# Patient Record
Sex: MALE | Race: WHITE | NOT HISPANIC OR LATINO | Employment: OTHER | URBAN - METROPOLITAN AREA
[De-identification: names, ages, dates, MRNs, and addresses within clinical notes are randomized per-mention and may not be internally consistent; named-entity substitution may affect disease eponyms.]

---

## 2017-03-30 ENCOUNTER — HOSPITAL ENCOUNTER (OUTPATIENT)
Dept: LAB | Facility: MEDICAL CENTER | Age: 62
End: 2017-03-30
Attending: INTERNAL MEDICINE
Payer: COMMERCIAL

## 2017-03-30 LAB
ALBUMIN SERPL BCP-MCNC: 4.5 G/DL (ref 3.2–4.9)
ALBUMIN/GLOB SERPL: 2 G/DL
ALP SERPL-CCNC: 52 U/L (ref 30–99)
ALT SERPL-CCNC: 26 U/L (ref 2–50)
ANION GAP SERPL CALC-SCNC: 7 MMOL/L (ref 0–11.9)
AST SERPL-CCNC: 21 U/L (ref 12–45)
BASOPHILS # BLD AUTO: 0.06 K/UL (ref 0–0.12)
BASOPHILS NFR BLD AUTO: 1 % (ref 0–1.8)
BILIRUB SERPL-MCNC: 0.6 MG/DL (ref 0.1–1.5)
BUN SERPL-MCNC: 24 MG/DL (ref 8–22)
CALCIUM SERPL-MCNC: 9.4 MG/DL (ref 8.5–10.5)
CHLORIDE SERPL-SCNC: 103 MMOL/L (ref 96–112)
CO2 SERPL-SCNC: 27 MMOL/L (ref 20–33)
CREAT SERPL-MCNC: 1.02 MG/DL (ref 0.5–1.4)
EOSINOPHIL # BLD: 0.37 K/UL (ref 0–0.51)
EOSINOPHIL NFR BLD AUTO: 6 % (ref 0–6.9)
ERYTHROCYTE [DISTWIDTH] IN BLOOD BY AUTOMATED COUNT: 41 FL (ref 35.9–50)
GLOBULIN SER CALC-MCNC: 2.2 G/DL (ref 1.9–3.5)
GLUCOSE SERPL-MCNC: 92 MG/DL (ref 65–99)
HCT VFR BLD AUTO: 48.3 % (ref 42–52)
HGB BLD-MCNC: 16.8 G/DL (ref 14–18)
IMM GRANULOCYTES # BLD AUTO: 0.01 K/UL (ref 0–0.11)
IMM GRANULOCYTES NFR BLD AUTO: 0.2 % (ref 0–0.9)
LYMPHOCYTES # BLD: 1.78 K/UL (ref 1–4.8)
LYMPHOCYTES NFR BLD AUTO: 29 % (ref 22–41)
MCH RBC QN AUTO: 31.8 PG (ref 27–33)
MCHC RBC AUTO-ENTMCNC: 34.8 G/DL (ref 33.7–35.3)
MCV RBC AUTO: 91.5 FL (ref 81.4–97.8)
MONOCYTES # BLD: 0.41 K/UL (ref 0–0.85)
MONOCYTES NFR BLD AUTO: 6.7 % (ref 0–13.4)
NEUTROPHILS # BLD: 3.51 K/UL (ref 1.82–7.42)
NEUTROPHILS NFR BLD AUTO: 57.1 % (ref 44–72)
NRBC # BLD AUTO: 0 K/UL
NRBC BLD-RTO: 0 /100 WBC
PLATELET # BLD AUTO: 235 K/UL (ref 164–446)
PMV BLD AUTO: 9.6 FL (ref 9–12.9)
POTASSIUM SERPL-SCNC: 4.2 MMOL/L (ref 3.6–5.5)
PROT SERPL-MCNC: 6.7 G/DL (ref 6–8.2)
PSA SERPL DL<=0.01 NG/ML-MCNC: 1.48 NG/ML (ref 0–4)
RBC # BLD AUTO: 5.28 M/UL (ref 4.7–6.1)
SODIUM SERPL-SCNC: 137 MMOL/L (ref 135–145)
WBC # BLD AUTO: 6.1 K/UL (ref 4.8–10.8)

## 2017-03-30 PROCEDURE — 84403 ASSAY OF TOTAL TESTOSTERONE: CPT

## 2017-03-30 PROCEDURE — 36415 COLL VENOUS BLD VENIPUNCTURE: CPT

## 2017-03-30 PROCEDURE — 85025 COMPLETE CBC W/AUTO DIFF WBC: CPT

## 2017-03-30 PROCEDURE — 80053 COMPREHEN METABOLIC PANEL: CPT

## 2017-03-30 PROCEDURE — 83704 LIPOPROTEIN BLD QUAN PART: CPT

## 2017-03-30 PROCEDURE — 84153 ASSAY OF PSA TOTAL: CPT

## 2017-03-30 PROCEDURE — 84402 ASSAY OF FREE TESTOSTERONE: CPT

## 2017-03-30 PROCEDURE — 80061 LIPID PANEL: CPT

## 2017-03-30 PROCEDURE — 84270 ASSAY OF SEX HORMONE GLOBUL: CPT

## 2017-03-31 LAB
SHBG SERPL-SCNC: 37 NMOL/L (ref 11–80)
TESTOST FREE MFR SERPL: 1.8 % (ref 1.6–2.9)
TESTOST FREE SERPL-MCNC: 99 PG/ML (ref 47–244)
TESTOST SERPL-MCNC: 551 NG/DL (ref 300–720)

## 2017-04-02 LAB
CHOLEST SERPL-MCNC: 178 MG/DL (ref 100–199)
HDL PARTICAL NO Q4363: 37.3 UMOL/L
HDL SERPL QN: 10.1 NM
HDLC SERPL-MCNC: 74 MG/DL
HLD.LARGE SERPL-SCNC: 10.6 UMOL/L
LDL MED SERPL QN: 21.2 NM
LDL SERPL QN: 21.2 NM
LDL SERPL-SCNC: 1035 NMOL/L
LDL SMALL SERPL-SCNC: <90 NMOL/L
LDL SMALL SERPL-SCNC: <90 NMOL/L
LDLC SERPL CALC-MCNC: 95 MG/DL (ref 0–99)
LP IR SCORE Q4364: <25
TRIGL SERPL-MCNC: 45 MG/DL (ref 0–149)
VLDL LARGE SERPL-SCNC: 1.3 NMOL/L
VLDL SERPL QN: 56.7 NM

## 2017-06-13 ENCOUNTER — OFFICE VISIT (OUTPATIENT)
Dept: INTERNAL MEDICINE | Facility: IMAGING CENTER | Age: 62
End: 2017-06-13
Payer: COMMERCIAL

## 2017-06-13 VITALS
DIASTOLIC BLOOD PRESSURE: 80 MMHG | TEMPERATURE: 97.9 F | WEIGHT: 192 LBS | HEART RATE: 65 BPM | RESPIRATION RATE: 14 BRPM | HEIGHT: 73 IN | OXYGEN SATURATION: 95 % | BODY MASS INDEX: 25.45 KG/M2 | SYSTOLIC BLOOD PRESSURE: 140 MMHG

## 2017-06-13 DIAGNOSIS — I10 ESSENTIAL HYPERTENSION: Chronic | ICD-10-CM

## 2017-06-13 DIAGNOSIS — R79.89 LOW TESTOSTERONE LEVEL IN MALE: Chronic | ICD-10-CM

## 2017-06-13 PROCEDURE — 99204 OFFICE O/P NEW MOD 45 MIN: CPT | Performed by: FAMILY MEDICINE

## 2017-06-13 RX ORDER — LOSARTAN POTASSIUM 50 MG/1
50 TABLET ORAL DAILY
COMMUNITY
End: 2017-08-01 | Stop reason: SDUPTHER

## 2017-06-13 RX ORDER — TESTOSTERONE 20.25 MG/1.25G
1 GEL TOPICAL DAILY
COMMUNITY
End: 2017-08-01 | Stop reason: SDUPTHER

## 2017-06-13 RX ORDER — AMLODIPINE BESYLATE 10 MG/1
10 TABLET ORAL DAILY
COMMUNITY
End: 2017-08-01 | Stop reason: SDUPTHER

## 2017-06-13 ASSESSMENT — PATIENT HEALTH QUESTIONNAIRE - PHQ9: CLINICAL INTERPRETATION OF PHQ2 SCORE: 0

## 2017-06-13 NOTE — PROGRESS NOTES
"CC: establish care    HPI:  Patient is a 61 y.o. male here to establish care at my office. He has previously seen Dr. Hayes for his primary care needs and had recent labs. He has chronic essential hypertension well controlled on daily norvasc and losartan - he reports that his home BP log runs 120/70s average. He also has chronic low testosterone on Androgel replacement for the past five years with frequent labs done by Dr. Hayes. Pt does endorse that he has not had a prostate exam in approximately 10+ years despite being on Androgel. Pt remains very active and tries to maintain a healthy diet. He is in good spirits today and has no complaints.     Patient Active Problem List    Diagnosis Date Noted   • Low testosterone level in male 06/13/2017   • Essential hypertension 06/13/2017     Past medical, surgical, family, and social history was reviewed and updated in Epic chart by me today.     Medications and allergies reviewed and updated in Epic chart by me today.     ROS:  Pertinent positives listed above in HPI. All other systems have been reviewed and are negative.    PE:   /80 mmHg  Pulse 65  Temp(Src) 36.6 °C (97.9 °F)  Resp 14  Ht 1.854 m (6' 1\")  Wt 87.091 kg (192 lb)  BMI 25.34 kg/m2  SpO2 95%  Vital signs reviewed with patient.     Gen: Well developed; well nourished; no acute distress; age appropriate appearance   HEENT: Normocephalic; atraumatic; PEERLA b/l; sclera clear b/l; b/l external auditory canals WNL; b/l TM WNL; oropharynx clear; oral mucosa moist; tongue midline; dentition adequate   Neck: No adenopathy; no thyromegaly  CV: Regular rate and rhythm; S1/ S2 present; no murmur, gallop or rub noted  Pulm: No respiratory distress; clear to ascultation b/l; no wheezing or stridor noted b/l  Abd: Adequate bowel sounds noted; soft and nontender; no rebound, rigidity, nor distention  Extremities: No peripheral edema b/l LE extremities/ no clubbing nor cyanosis noted  Skin: Warm and dry; no rashes " noted   Neuro: No focal deficits noted   Psych: AAOx4; mood and affect are appropriate    A/P:  1. Chronic low testosterone level in male  Stable/ pt continues daily Androgel/ will obtain recent labs and records from Dr. Hayes's office. He will need prostate exam before the end of the year.     2. Chronic essential hypertension  Stable/ home BP readings avg 120/70s/ pt to continue daily norvasc and losartan    I will be in touch with patient when I receive records from Dr. Hayes's office. Pt will need exam with prostate evaluation before the end of the year.

## 2017-06-13 NOTE — MR AVS SNAPSHOT
"        Evan Wright   2017 1:00 PM   Office Visit   MRN: [id number]    Department:  [facility]   Dept Phone:  [phone]    Description:  Male : 1955   Provider:  Adriana Hinson M.D.           Allergies as of 2017     No Known Allergies      You were diagnosed with     Low testosterone level in male   [0314800]       Essential hypertension   [[id number]]         Vital Signs     Blood Pressure Pulse Temperature Respirations Height Weight    140/80 mmHg 65 36.6 °C (97.9 °F) 14 1.854 m (6' 1\") 87.091 kg (192 lb)    Body Mass Index Oxygen Saturation Smoking Status             25.34 kg/m2 95% Never Smoker          Basic Information     Date Of Birth Sex Race Ethnicity Preferred Language    1955 Male White Non- English      Problem List              ICD-10-CM Priority Class Noted - Resolved    Low testosterone level in male (Chronic) E29.1   2017 - Present    Essential hypertension (Chronic) I10   2017 - Present      Health Maintenance     Patient has no pending health maintenance at this time      Current Immunizations     Tdap Vaccine 2014      Below and/or attached are the medications your provider expects you to take. Review all of your home medications and newly ordered medications with your provider and/or pharmacist. Follow medication instructions as directed by your provider and/or pharmacist. Please keep your medication list with you and share with your provider. Update the information when medications are discontinued, doses are changed, or new medications (including over-the-counter products) are added; and carry medication information at all times in the event of emergency situations     Allergies:  No Known Allergies          Medications  Valid as of: 2017 -  3:28 PM    Generic Name Brand Name Tablet Size Instructions for use    AmLODIPine Besylate (Tab) NORVASC 10 MG Take 10 mg by mouth every day.        B Complex Vitamins   Take 1 Tab by " mouth every day.        Bioflavonoid Products   Take 1 Tab by mouth every day.        Losartan Potassium (Tab) COZAAR 50 MG Take 50 mg by mouth every day.        Testosterone (Gel) Testosterone 20.25 MG/1.25GM (1.62%) Apply 1 Act to skin as directed every day.        .                 Medicines prescribed today were sent to:     [facility] [address] [place] - [address] [facility] [place]    [address] [facility] [place] [address]    Phone: [phone] Fax: [phone]    Open 24 Hours?: No      Medication refill instructions:       If your prescription bottle indicates you have medication refills left, it is not necessary to call your provider’s office. Please contact your pharmacy and they will refill your medication.    If your prescription bottle indicates you do not have any refills left, you may request refills at any time through one of the following ways: The online PatientKeeper system (except Urgent Care), by calling your provider’s office, or by asking your pharmacy to contact your provider’s office with a refill request. Medication refills are processed only during regular business hours and may not be available until the next business day. Your provider may request additional information or to have a follow-up visit with you prior to refilling your medication.   *Please Note: Medication refills are assigned a new Rx number when refilled electronically. Your pharmacy may indicate that no refills were authorized even though a new prescription for the same medication is available at the pharmacy. Please request the medicine by name with the pharmacy before contacting your provider for a refill.           PatientKeeper Access Code: Activation code not generated  Current PatientKeeper Status: Active

## 2017-07-03 DIAGNOSIS — Z00.00 HEALTH CARE MAINTENANCE: ICD-10-CM

## 2017-07-03 DIAGNOSIS — R79.89 LOW TESTOSTERONE LEVEL IN MALE: Chronic | ICD-10-CM

## 2017-07-03 DIAGNOSIS — I10 ESSENTIAL HYPERTENSION: Chronic | ICD-10-CM

## 2017-07-03 DIAGNOSIS — Z13.21 ENCOUNTER FOR VITAMIN DEFICIENCY SCREENING: ICD-10-CM

## 2017-08-01 DIAGNOSIS — I10 ESSENTIAL HYPERTENSION: Chronic | ICD-10-CM

## 2017-08-01 DIAGNOSIS — R79.89 LOW TESTOSTERONE LEVEL IN MALE: Chronic | ICD-10-CM

## 2017-08-01 RX ORDER — LOSARTAN POTASSIUM 50 MG/1
50 TABLET ORAL DAILY
Qty: 90 TAB | Refills: 4 | Status: SHIPPED | OUTPATIENT
Start: 2017-08-01 | End: 2018-08-21 | Stop reason: SDUPTHER

## 2017-08-01 RX ORDER — TESTOSTERONE 20.25 MG/1.25G
1 GEL TOPICAL DAILY
Qty: 88 G | Refills: 5 | Status: SHIPPED
Start: 2017-08-01 | End: 2018-03-12 | Stop reason: SDUPTHER

## 2017-08-01 RX ORDER — AMLODIPINE BESYLATE 10 MG/1
10 TABLET ORAL DAILY
Qty: 90 TAB | Refills: 4 | Status: SHIPPED | OUTPATIENT
Start: 2017-08-01 | End: 2018-08-21 | Stop reason: SDUPTHER

## 2017-09-01 ENCOUNTER — HOSPITAL ENCOUNTER (OUTPATIENT)
Facility: MEDICAL CENTER | Age: 62
End: 2017-09-01
Attending: FAMILY MEDICINE
Payer: COMMERCIAL

## 2017-09-01 ENCOUNTER — NON-PROVIDER VISIT (OUTPATIENT)
Dept: INTERNAL MEDICINE | Facility: IMAGING CENTER | Age: 62
End: 2017-09-01
Payer: COMMERCIAL

## 2017-09-01 DIAGNOSIS — Z00.00 HEALTH CARE MAINTENANCE: ICD-10-CM

## 2017-09-01 DIAGNOSIS — Z01.89 ENCOUNTER FOR ROUTINE LABORATORY TESTING: ICD-10-CM

## 2017-09-01 DIAGNOSIS — Z23 NEED FOR ZOSTER VACCINATION: ICD-10-CM

## 2017-09-01 DIAGNOSIS — I10 ESSENTIAL HYPERTENSION: Chronic | ICD-10-CM

## 2017-09-01 DIAGNOSIS — Z13.21 ENCOUNTER FOR VITAMIN DEFICIENCY SCREENING: ICD-10-CM

## 2017-09-01 DIAGNOSIS — R79.89 LOW TESTOSTERONE LEVEL IN MALE: Chronic | ICD-10-CM

## 2017-09-01 LAB
25(OH)D3 SERPL-MCNC: 40 NG/ML (ref 30–100)
ALBUMIN SERPL BCP-MCNC: 4.7 G/DL (ref 3.2–4.9)
ALBUMIN/GLOB SERPL: 2.4 G/DL
ALP SERPL-CCNC: 46 U/L (ref 30–99)
ALT SERPL-CCNC: 30 U/L (ref 2–50)
ANION GAP SERPL CALC-SCNC: 11 MMOL/L (ref 0–11.9)
AST SERPL-CCNC: 29 U/L (ref 12–45)
BASOPHILS # BLD AUTO: 0.5 % (ref 0–1.8)
BASOPHILS # BLD: 0.03 K/UL (ref 0–0.12)
BILIRUB SERPL-MCNC: 1.1 MG/DL (ref 0.1–1.5)
BUN SERPL-MCNC: 24 MG/DL (ref 8–22)
CALCIUM SERPL-MCNC: 10 MG/DL (ref 8.5–10.5)
CHLORIDE SERPL-SCNC: 104 MMOL/L (ref 96–112)
CHOLEST SERPL-MCNC: 177 MG/DL (ref 100–199)
CO2 SERPL-SCNC: 23 MMOL/L (ref 20–33)
CREAT SERPL-MCNC: 0.93 MG/DL (ref 0.5–1.4)
EOSINOPHIL # BLD AUTO: 0.24 K/UL (ref 0–0.51)
EOSINOPHIL NFR BLD: 4.3 % (ref 0–6.9)
ERYTHROCYTE [DISTWIDTH] IN BLOOD BY AUTOMATED COUNT: 44.7 FL (ref 35.9–50)
GFR SERPL CREATININE-BSD FRML MDRD: >60 ML/MIN/1.73 M 2
GLOBULIN SER CALC-MCNC: 2 G/DL (ref 1.9–3.5)
GLUCOSE SERPL-MCNC: 86 MG/DL (ref 65–99)
HCT VFR BLD AUTO: 49.2 % (ref 42–52)
HDLC SERPL-MCNC: 75 MG/DL
HGB BLD-MCNC: 16.8 G/DL (ref 14–18)
IMM GRANULOCYTES # BLD AUTO: 0.02 K/UL (ref 0–0.11)
IMM GRANULOCYTES NFR BLD AUTO: 0.4 % (ref 0–0.9)
LDLC SERPL CALC-MCNC: 88 MG/DL
LYMPHOCYTES # BLD AUTO: 1.44 K/UL (ref 1–4.8)
LYMPHOCYTES NFR BLD: 25.9 % (ref 22–41)
MCH RBC QN AUTO: 32 PG (ref 27–33)
MCHC RBC AUTO-ENTMCNC: 34.1 G/DL (ref 33.7–35.3)
MCV RBC AUTO: 93.7 FL (ref 81.4–97.8)
MONOCYTES # BLD AUTO: 0.45 K/UL (ref 0–0.85)
MONOCYTES NFR BLD AUTO: 8.1 % (ref 0–13.4)
NEUTROPHILS # BLD AUTO: 3.38 K/UL (ref 1.82–7.42)
NEUTROPHILS NFR BLD: 60.8 % (ref 44–72)
NRBC # BLD AUTO: 0 K/UL
NRBC BLD AUTO-RTO: 0 /100 WBC
PLATELET # BLD AUTO: 211 K/UL (ref 164–446)
PMV BLD AUTO: 10.5 FL (ref 9–12.9)
POTASSIUM SERPL-SCNC: 4.2 MMOL/L (ref 3.6–5.5)
PROT SERPL-MCNC: 6.7 G/DL (ref 6–8.2)
PSA SERPL-MCNC: 1.09 NG/ML (ref 0–4)
RBC # BLD AUTO: 5.25 M/UL (ref 4.7–6.1)
SODIUM SERPL-SCNC: 138 MMOL/L (ref 135–145)
TRIGL SERPL-MCNC: 69 MG/DL (ref 0–149)
WBC # BLD AUTO: 5.6 K/UL (ref 4.8–10.8)

## 2017-09-01 PROCEDURE — 84403 ASSAY OF TOTAL TESTOSTERONE: CPT

## 2017-09-01 PROCEDURE — 90736 HZV VACCINE LIVE SUBQ: CPT | Performed by: FAMILY MEDICINE

## 2017-09-01 PROCEDURE — 84402 ASSAY OF FREE TESTOSTERONE: CPT

## 2017-09-01 PROCEDURE — 84270 ASSAY OF SEX HORMONE GLOBUL: CPT

## 2017-09-01 PROCEDURE — 80061 LIPID PANEL: CPT

## 2017-09-01 PROCEDURE — 80053 COMPREHEN METABOLIC PANEL: CPT

## 2017-09-01 PROCEDURE — 84153 ASSAY OF PSA TOTAL: CPT

## 2017-09-01 PROCEDURE — 90471 IMMUNIZATION ADMIN: CPT | Performed by: FAMILY MEDICINE

## 2017-09-01 PROCEDURE — 82306 VITAMIN D 25 HYDROXY: CPT

## 2017-09-01 PROCEDURE — 85025 COMPLETE CBC W/AUTO DIFF WBC: CPT

## 2017-09-03 LAB
SHBG SERPL-SCNC: 35 NMOL/L (ref 11–80)
TESTOST FREE MFR SERPL: 1.8 % (ref 1.6–2.9)
TESTOST FREE SERPL-MCNC: 75 PG/ML (ref 47–244)
TESTOST SERPL-MCNC: 417 NG/DL (ref 300–720)

## 2017-10-11 ENCOUNTER — NON-PROVIDER VISIT (OUTPATIENT)
Dept: INTERNAL MEDICINE | Facility: IMAGING CENTER | Age: 62
End: 2017-10-11
Payer: COMMERCIAL

## 2017-10-11 DIAGNOSIS — Z23 NEED FOR INFLUENZA VACCINATION: ICD-10-CM

## 2017-10-11 PROCEDURE — 90686 IIV4 VACC NO PRSV 0.5 ML IM: CPT | Performed by: FAMILY MEDICINE

## 2017-10-11 PROCEDURE — 90471 IMMUNIZATION ADMIN: CPT | Performed by: FAMILY MEDICINE

## 2018-03-12 DIAGNOSIS — R79.89 LOW TESTOSTERONE LEVEL IN MALE: Chronic | ICD-10-CM

## 2018-03-12 RX ORDER — TESTOSTERONE 20.25 MG/1.25G
1 GEL TOPICAL DAILY
Qty: 75 G | Refills: 0 | Status: SHIPPED
Start: 2018-03-12 | End: 2019-03-28 | Stop reason: SDUPTHER

## 2018-03-23 ENCOUNTER — OFFICE VISIT (OUTPATIENT)
Dept: INTERNAL MEDICINE | Facility: IMAGING CENTER | Age: 63
End: 2018-03-23
Payer: COMMERCIAL

## 2018-03-23 VITALS
WEIGHT: 200 LBS | OXYGEN SATURATION: 97 % | BODY MASS INDEX: 26.51 KG/M2 | HEIGHT: 73 IN | RESPIRATION RATE: 14 BRPM | DIASTOLIC BLOOD PRESSURE: 70 MMHG | HEART RATE: 67 BPM | TEMPERATURE: 98.6 F | SYSTOLIC BLOOD PRESSURE: 122 MMHG

## 2018-03-23 DIAGNOSIS — F51.01 PRIMARY INSOMNIA: ICD-10-CM

## 2018-03-23 PROCEDURE — 99214 OFFICE O/P EST MOD 30 MIN: CPT | Performed by: FAMILY MEDICINE

## 2018-03-23 RX ORDER — MULTIVIT-MIN/IRON/FOLIC ACID/K 18-600-40
2000 CAPSULE ORAL DAILY
COMMUNITY

## 2018-03-23 RX ORDER — ALPRAZOLAM 0.25 MG/1
0.25 TABLET ORAL NIGHTLY PRN
Qty: 30 TAB | Refills: 2 | Status: SHIPPED
Start: 2018-03-23 | End: 2018-04-22

## 2018-03-24 DIAGNOSIS — N30.00 ACUTE CYSTITIS WITHOUT HEMATURIA: ICD-10-CM

## 2018-03-24 PROBLEM — Z85.828 HISTORY OF SQUAMOUS CELL CARCINOMA OF SKIN: Status: ACTIVE | Noted: 2018-03-24

## 2018-03-24 PROBLEM — F51.01 PRIMARY INSOMNIA: Chronic | Status: ACTIVE | Noted: 2018-03-23

## 2018-03-24 RX ORDER — CEFDINIR 300 MG/1
300 CAPSULE ORAL 2 TIMES DAILY
Qty: 10 CAP | Refills: 0 | Status: SHIPPED | OUTPATIENT
Start: 2018-03-24 | End: 2018-03-29

## 2018-03-25 NOTE — PROGRESS NOTES
"Chief Complaint   Patient presents with   • Insomnia       HPI:  Patient is a 62 y.o. male established patient who presents today for evaluation of chronic insomnia. 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 He has been using his wife's Xanax with good results and would like his own prescription for it. I cautioned him against sharing medications with his wife for both safety and legal reasons. He also reports having a small bump on inside of R lower eyelid that intermittently causes him redness and irritation. He denies that it enlarges and no discharge noted. He is otherwise feeling well at this time and reports no current health changes.     Patient Active Problem List    Diagnosis Date Noted   • History of squamous cell carcinoma of skin 03/24/2018   • Primary insomnia 03/23/2018   • Low testosterone level in male 06/13/2017   • Essential hypertension 06/13/2017   • Tubular adenoma of colon 01/01/2014     Past medical, surgical, family, and social history was reviewed and updated in Epic chart by me today.     Medications and allergies reviewed and updated in Epic chart by me today.     ROS:  Pertinent positives listed above in HPI. All other systems have been reviewed and are negative.    PE:   /70   Pulse 67   Temp 37 °C (98.6 °F)   Resp 14   Ht 1.854 m (6' 0.99\")   Wt 90.7 kg (200 lb)   SpO2 97%   BMI 26.39 kg/m²   Vital signs reviewed with patient.     Gen: Well developed; well nourished; no acute distress; age appropriate appearance   HEENT: Normocephalic; atraumatic; PEERLA b/l; sclera clear b/l; b/l external auditory canals WNL; b/l TM WNL; nares patent; oropharynx clear; oral mucosa moist; tongue midline; dentition adequate   Neck: No adenopathy; no thyromegaly  CV: Regular rate and rhythm; S1/ S2 present; no murmur, gallop or rub noted  Pulm: No respiratory distress; clear to ascultation b/l; no wheezing or stridor noted " b/l  Abd: Adequate bowel sounds noted; soft and nontender; no rebound, rigidity, nor distention  Extremities: No peripheral edema b/l LE extremities/ no clubbing nor cyanosis noted  Skin: Warm and dry; no rashes noted   Neuro: No focal deficits noted   Psych: AAOx4; mood and affect are appropriate  : Rectal exam: nl sphincter tone/ no masses noted/ minimal brown stool in vault - guaiac negative with bedside testing.     A/P:  1. Primary insomnia  Stable/ controlled with PRN low dose Xanax use/  reviewed today and patient is well versed in safe use of controlled medications. Provided education about not sharing medications with his wife.   - ALPRAZolam (XANAX) 0.25 MG Tab; Take 1 Tab by mouth at bedtime as needed for Sleep for up to 30 days.  Dispense: 30 Tab; Refill: 2     Pt is stable at this time and will need to be seen in 3 months for controlled medication evaluation.

## 2018-03-26 ENCOUNTER — NON-PROVIDER VISIT (OUTPATIENT)
Dept: INTERNAL MEDICINE | Facility: IMAGING CENTER | Age: 63
End: 2018-03-26
Payer: COMMERCIAL

## 2018-03-26 ENCOUNTER — HOSPITAL ENCOUNTER (OUTPATIENT)
Facility: MEDICAL CENTER | Age: 63
End: 2018-03-26
Attending: FAMILY MEDICINE
Payer: COMMERCIAL

## 2018-03-26 DIAGNOSIS — R30.0 DYSURIA: ICD-10-CM

## 2018-03-26 PROCEDURE — 87086 URINE CULTURE/COLONY COUNT: CPT

## 2018-03-26 NOTE — NON-PROVIDER
Patient has taken dose #5 of 10 doses Omnicef.  His dysuria is minimally improved, using AZO.  Recent constipation (due to travel) seems to exacerbate his dysuria per his report.  Await urine culture report in 48 hours.

## 2018-03-27 ENCOUNTER — HOSPITAL ENCOUNTER (EMERGENCY)
Facility: MEDICAL CENTER | Age: 63
End: 2018-03-27
Attending: EMERGENCY MEDICINE
Payer: COMMERCIAL

## 2018-03-27 VITALS
TEMPERATURE: 97.9 F | DIASTOLIC BLOOD PRESSURE: 93 MMHG | OXYGEN SATURATION: 94 % | HEIGHT: 73 IN | WEIGHT: 203.93 LBS | BODY MASS INDEX: 27.03 KG/M2 | RESPIRATION RATE: 18 BRPM | SYSTOLIC BLOOD PRESSURE: 148 MMHG | HEART RATE: 65 BPM

## 2018-03-27 DIAGNOSIS — R10.9 ACUTE ABDOMINAL PAIN: ICD-10-CM

## 2018-03-27 DIAGNOSIS — R33.8 ACUTE URINARY RETENTION: ICD-10-CM

## 2018-03-27 LAB
APPEARANCE UR: CLEAR
BACTERIA #/AREA URNS HPF: ABNORMAL /HPF
BILIRUB UR QL STRIP.AUTO: NORMAL
COLOR UR: NORMAL
CULTURE IF INDICATED INDCX: YES UA CULTURE
GLUCOSE UR STRIP.AUTO-MCNC: NORMAL MG/DL
KETONES UR STRIP.AUTO-MCNC: NORMAL MG/DL
LEUKOCYTE ESTERASE UR QL STRIP.AUTO: NORMAL
MICRO URNS: NORMAL
MUCOUS THREADS #/AREA URNS HPF: ABNORMAL /HPF
NITRITE UR QL STRIP.AUTO: NORMAL
PH UR STRIP.AUTO: NORMAL [PH]
PROT UR QL STRIP: NORMAL MG/DL
RBC # URNS HPF: ABNORMAL /HPF
RBC UR QL AUTO: NORMAL
SP GR UR STRIP.AUTO: 1.02
WBC #/AREA URNS HPF: ABNORMAL /HPF

## 2018-03-27 PROCEDURE — 99284 EMERGENCY DEPT VISIT MOD MDM: CPT

## 2018-03-27 PROCEDURE — 87086 URINE CULTURE/COLONY COUNT: CPT

## 2018-03-27 PROCEDURE — 303105 HCHG CATHETER EXTRA

## 2018-03-27 PROCEDURE — 81001 URINALYSIS AUTO W/SCOPE: CPT

## 2018-03-27 PROCEDURE — 51702 INSERT TEMP BLADDER CATH: CPT

## 2018-03-27 NOTE — DISCHARGE INSTRUCTIONS
Romo Catheter Care, Adult  A Romo catheter is a soft, flexible tube that is placed into the bladder to drain urine. A Romo catheter may be inserted if:  · You leak urine or are not able to control when you urinate (urinary incontinence).  · You are not able to urinate when you need to (urinary retention).  · You had prostate surgery or surgery on the genitals.  · You have certain medical conditions, such as multiple sclerosis, dementia, or a spinal cord injury.  If you are going home with a Romo catheter in place, follow the instructions below.  TAKING CARE OF THE CATHETER  1. Wash your hands with soap and water.  2. Using mild soap and warm water on a clean washcloth:  ¨ Clean the area on your body closest to the catheter insertion site using a circular motion, moving away from the catheter. Never wipe toward the catheter because this could sweep bacteria up into the urethra and cause infection.  ¨ Remove all traces of soap. Pat the area dry with a clean towel. For males, reposition the foreskin.  3. Attach the catheter to your leg so there is no tension on the catheter. Use adhesive tape or a leg strap. If you are using adhesive tape, remove any sticky residue left behind by the previous tape you used.  4. Keep the drainage bag below the level of the bladder, but keep it off the floor.  5. Check throughout the day to be sure the catheter is working and urine is draining freely. Make sure the tubing does not become kinked.  6. Do not pull on the catheter or try to remove it. Pulling could damage internal tissues.  TAKING CARE OF THE DRAINAGE BAGS  You will be given two drainage bags to take home. One is a large overnight drainage bag, and the other is a smaller leg bag that fits underneath clothing. You may wear the overnight bag at any time, but you should never wear the smaller leg bag at night. Follow the instructions below for how to empty, change, and clean your drainage bags.  Emptying the Drainage  Bag  You must empty your drainage bag when it is  -½ full or at least 2-3 times a day.  1. Wash your hands with soap and water.  2. Keep the drainage bag below your hips, below the level of your bladder. This stops urine from going back into the tubing and into your bladder.  3. Hold the dirty bag over the toilet or a clean container.  4. Open the pour spout at the bottom of the bag and empty the urine into the toilet or container. Do not let the pour spout touch the toilet, container, or any other surface. Doing so can place bacteria on the bag, which can cause an infection.  5. Clean the pour spout with a gauze pad or cotton ball that has rubbing alcohol on it.  6. Close the pour spout.  7. Attach the bag to your leg with adhesive tape or a leg strap.  8. Wash your hands well.  Changing the Drainage Bag  Change your drainage bag once a month or sooner if it starts to smell bad or look dirty. Below are steps to follow when changing the drainage bag.  1. Wash your hands with soap and water.  2. Pinch off the rubber catheter so that urine does not spill out.  3. Disconnect the catheter tube from the drainage tube at the connection valve. Do not let the tubes touch any surface.  4. Clean the end of the catheter tube with an alcohol wipe. Use a different alcohol wipe to clean the end of the drainage tube.  5. Connect the catheter tube to the drainage tube of the clean drainage bag.  6. Attach the new bag to the leg with adhesive tape or a leg strap. Avoid attaching the new bag too tightly.  7. Wash your hands well.  Cleaning the Drainage Bag  1. Wash your hands with soap and water.  2. Wash the bag in warm, soapy water.  3. Rinse the bag thoroughly with warm water.  4. Fill the bag with a solution of white vinegar and water (1 cup vinegar to 1 qt warm water [.2 L vinegar to 1 L warm water]). Close the bag and soak it for 30 minutes in the solution.  5. Rinse the bag with warm water.  6. Hang the bag to dry with the  pour spout open and hanging downward.  7. Store the clean bag (once it is dry) in a clean plastic bag.  8. Wash your hands well.  PREVENTING INFECTION  · Wash your hands before and after handling your catheter.  · Take showers daily and wash the area where the catheter enters your body. Do not take baths. Replace wet leg straps with dry ones, if this applies.  · Do not use powders, sprays, or lotions on the genital area. Only use creams, lotions, or ointments as directed by your caregiver.  · For females, wipe from front to back after each bowel movement.  · Drink enough fluids to keep your urine clear or pale yellow unless you have a fluid restriction.  · Do not let the drainage bag or tubing touch or lie on the floor.  · Wear cotton underwear to absorb moisture and to keep your .  SEEK MEDICAL CARE IF:   · Your urine is cloudy or smells unusually bad.  · Your catheter becomes clogged.  · You are not draining urine into the bag or your bladder feels full.  · Your catheter starts to leak.  SEEK IMMEDIATE MEDICAL CARE IF:   · You have pain, swelling, redness, or pus where the catheter enters the body.  · You have pain in the abdomen, legs, lower back, or bladder.  · You have a fever.  · You see blood fill the catheter, or your urine is pink or red.  · You have nausea, vomiting, or chills.  · Your catheter gets pulled out.  MAKE SURE YOU:   · Understand these instructions.  · Will watch your condition.  · Will get help right away if you are not doing well or get worse.     This information is not intended to replace advice given to you by your health care provider. Make sure you discuss any questions you have with your health care provider.     Document Released: 12/18/2006 Document Revised: 05/04/2015 Document Reviewed: 12/09/2013  [employer] Interactive Patient Education ©2016 [employer]

## 2018-03-27 NOTE — ED PROVIDER NOTES
ED Provider Note      CHIEF COMPLAINT  No chief complaint on file.      HPI  Evan Wright is a 62 y.o. male who presents to the emergency department with a chief complaint of urinary retention. The patient had some urinary symptoms or last couple days. He is being treated for urinary tract infection by his primary care physician. He has been taking his antibiotic but now it seems the patient is having difficulty urinating. He has not had a good urine stream since yesterday. He feels suprapubic fullness and discomfort. He's been trying to urinate frequently but he is not been able to establish a good stream. He denies any flank pain. No fevers or vomiting. No history of similar.    REVIEW OF SYSTEMS  See HPI for further details. All other systems are negative.     PAST MEDICAL HISTORY  Past Medical History:   Diagnosis Date   • Hypertension    • Insomnia    • Testosterone insufficiency        FAMILY HISTORY  Family History   Problem Relation Age of Onset   • No Known Problems Mother    • Cancer Father      liver CA   • Alcohol/Drug Father        SOCIAL HISTORY  Social History     Social History   • Marital status:      Spouse name: N/A   • Number of children: N/A   • Years of education: N/A     Social History Main Topics   • Smoking status: Never Smoker   • Smokeless tobacco: Never Used   • Alcohol use Yes      Comment: wine daily   • Drug use: No   • Sexual activity: Yes     Partners: Female     Other Topics Concern   • Not on file     Social History Narrative   • No narrative on file       SURGICAL HISTORY  Past Surgical History:   Procedure Laterality Date   • FOOT SURGERY Left 2010    left foot neuroma       CURRENT MEDICATIONS  Home Medications     Reviewed by Denys Page (Pharmacy Tech) on 03/27/18 at 0913  Med List Status: Complete   Medication Last Dose Status   ALPRAZolam (XANAX) 0.25 MG Tab 10 DAYS Active   amlodipine (NORVASC) 10 MG Tab 3/26/2018 Active   B Complex Vitamins (B COMPLEX 1  "PO) 3/26/2018 Active   cefdinir (OMNICEF) 300 MG Cap 3/27/2018 Active   Cholecalciferol (VITAMIN D) 2000 units Cap 3/26/2018 Active   losartan (COZAAR) 50 MG Tab 3/26/2018 Active   Testosterone (ANDROGEL) 20.25 MG/1.25GM (1.62%) Gel 3/26/2018 Active                ALLERGIES  No Known Allergies    PHYSICAL EXAM  VITAL SIGNS: /93   Pulse 78   Temp 36.6 °C (97.9 °F)   Resp 18   Ht 1.854 m (6' 1\")   Wt 92.5 kg (203 lb 14.8 oz)   SpO2 97%   BMI 26.90 kg/m²   Constitutional: Well developed, Well nourished, moderate distress, Non-toxic appearance.   HENT: Normocephalic, Atraumatic, Bilateral external ears normal, Oropharynx moist, No oral exudates, Nose normal.   Eyes: PER, EOMI grossly, Conjunctiva normal, No discharge.   Neck: Normal range of motion, No tenderness, Supple, No stridor.   Lymphatic: No lymphadenopathy noted.   Cardiovascular: Normal heart rate, Normal rhythm, No murmurs, No rubs, No gallops.   Thorax & Lungs: Normal breath sounds, No respiratory distress, No wheezing, No chest tenderness.   Abdomen: Bladder is palpable at the level of the umbilicus, suprapubic fullness and mild tenderness noted, no peritoneal signs, no CVA tenderness. Active bowel sounds. No palpable masses.  Skin: Warm, Dry, No erythema, No rash.   Back: No tenderness, No CVA tenderness.   Extremities: Intact distal pulses, No edema, No tenderness, No cyanosis, No clubbing.   Neurologic: Alert & oriented x 3, Normal motor function, Normal sensory function, No focal deficits noted.       RADIOLOGY/PROCEDURES  Results for orders placed or performed during the hospital encounter of 03/27/18   URINALYSIS CULTURE, IF INDICATED   Result Value Ref Range    Color Orange     Character Clear     Specific Gravity 1.025 <1.035    Ph comment 5.0 - 8.0    Glucose comment Negative mg/dL    Ketones comment Negative mg/dL    Protein comment Negative mg/dL    Bilirubin comment Negative    Nitrite comment Negative    Leukocyte Esterase comment " Negative    Occult Blood comment Negative    Micro Urine Req Microscopic     Culture Indicated Yes UA Culture   URINE MICROSCOPIC (W/UA)   Result Value Ref Range    WBC 2-5 (A) /hpf    RBC 0-2 (A) /hpf    Bacteria Rare (A) None /hpf    Mucous Threads Few /hpf         COURSE & MEDICAL DECISION MAKING  Pertinent Labs & Imaging studies reviewed. (See chart for details)    The patient presents today with a history and physical examination most consistent with acute urinary retention. In emergency department a Romo catheter was placed.    The patient presents today with urinary retention. Romo catheter was placed, 1300 mL of urine was drained. The patient is symptomatically significantly improved. Urinalysis is remarkable for 2-5 white blood cells, will be sent for culture. I will have the patient continue taking Omnicef as previously prescribed. The patient will follow-up with urology.    The patient will return for new or worsening symptoms and is stable at the time of discharge.    The patient is referred to a primary physician for blood pressure management, diabetic screening, and for all other preventative health concerns.    DISPOSITION:  Patient will be discharged home in stable condition.    FOLLOW UP:  [facility], Emergency Dept  [address]  [facility] [id number] [phone]    If symptoms worsen    Hemanth Lynn M.D.  [address]  [address]  [facility] [address]  [phone]    Call today  urology      OUTPATIENT MEDICATIONS:  New Prescriptions    No medications on file         FINAL IMPRESSION  1. Acute abdominal pain    2. Acute urinary retention            Electronically signed by: Adriano Baca, 3/27/2018 9:15 AM

## 2018-03-27 NOTE — ED NOTES
Romo cath placed. 1300 ml ouptput immediately drained. Gianna/orange hued output; pt states taking azo/pyridium po pta. ua sent to lab

## 2018-03-27 NOTE — ED NOTES
Pt amb to rm#3; c/o difficulty urinating x3d. Pt seen at pcp Saturday, Dr Hinson started on antibiotics: cefdinir 300mg po daily for UTI. Pt states s/s not improving

## 2018-03-27 NOTE — ED NOTES
Med rec complete per patient   Allergies reviewed    Patient is on a 5 day course of Cefdinir for a possible UTI, he stated he is on day 3

## 2018-03-29 ENCOUNTER — OFFICE VISIT (OUTPATIENT)
Dept: INTERNAL MEDICINE | Facility: IMAGING CENTER | Age: 63
End: 2018-03-29
Payer: COMMERCIAL

## 2018-03-29 ENCOUNTER — HOSPITAL ENCOUNTER (OUTPATIENT)
Facility: MEDICAL CENTER | Age: 63
End: 2018-03-29
Attending: FAMILY MEDICINE
Payer: COMMERCIAL

## 2018-03-29 VITALS
BODY MASS INDEX: 26.51 KG/M2 | HEIGHT: 73 IN | OXYGEN SATURATION: 91 % | DIASTOLIC BLOOD PRESSURE: 62 MMHG | SYSTOLIC BLOOD PRESSURE: 132 MMHG | RESPIRATION RATE: 16 BRPM | TEMPERATURE: 100.6 F | HEART RATE: 104 BPM | WEIGHT: 200 LBS

## 2018-03-29 DIAGNOSIS — R33.9 URINARY RETENTION: ICD-10-CM

## 2018-03-29 DIAGNOSIS — R50.9 FEVER, UNSPECIFIED FEVER CAUSE: ICD-10-CM

## 2018-03-29 LAB
APPEARANCE UR: CLEAR
BACTERIA UR CULT: NORMAL
BACTERIA UR CULT: NORMAL
BILIRUB UR STRIP-MCNC: NORMAL MG/DL
COLOR UR AUTO: NORMAL
FLUAV+FLUBV AG SPEC QL IA: NEGATIVE
GLUCOSE UR STRIP.AUTO-MCNC: NEGATIVE MG/DL
INT CON NEG: NEGATIVE
INT CON POS: POSITIVE
KETONES UR STRIP.AUTO-MCNC: 15 MG/DL
LEUKOCYTE ESTERASE UR QL STRIP.AUTO: NORMAL
NITRITE UR QL STRIP.AUTO: NEGATIVE
PH UR STRIP.AUTO: 5.5 [PH] (ref 5–8)
PROT UR QL STRIP: 30 MG/DL
RBC UR QL AUTO: NORMAL
SIGNIFICANT IND 70042: NORMAL
SIGNIFICANT IND 70042: NORMAL
SITE SITE: NORMAL
SITE SITE: NORMAL
SOURCE SOURCE: NORMAL
SOURCE SOURCE: NORMAL
SP GR UR STRIP.AUTO: 1.02
UROBILINOGEN UR STRIP-MCNC: 0.2 MG/DL

## 2018-03-29 PROCEDURE — 81002 URINALYSIS NONAUTO W/O SCOPE: CPT | Performed by: FAMILY MEDICINE

## 2018-03-29 PROCEDURE — 87804 INFLUENZA ASSAY W/OPTIC: CPT | Performed by: FAMILY MEDICINE

## 2018-03-29 PROCEDURE — 99214 OFFICE O/P EST MOD 30 MIN: CPT | Performed by: FAMILY MEDICINE

## 2018-03-29 PROCEDURE — 87086 URINE CULTURE/COLONY COUNT: CPT

## 2018-03-29 RX ORDER — LEVOFLOXACIN 500 MG/1
500 TABLET, FILM COATED ORAL DAILY
Qty: 7 TAB | Refills: 0 | Status: SHIPPED | OUTPATIENT
Start: 2018-03-29 | End: 2018-04-05

## 2018-03-30 ENCOUNTER — TELEPHONE (OUTPATIENT)
Dept: INTERNAL MEDICINE | Facility: IMAGING CENTER | Age: 63
End: 2018-03-30

## 2018-03-30 NOTE — PROGRESS NOTES
"Chief Complaint   Patient presents with   • Fever       HPI:  Patient is a 62 y.o. male established patient who presents today with his wife (who is not ill) for evaluation of new onset of chills, fever T>101 at home, and fatigue that suddenly occurred after eating lunch today at a local restaurant. He has taken 3 Advil prior to coming to the office and feels that his fever is breaking now. He came to see me 3/23/18 and was the picture of health but has experienced significant health changes since then.  Last weekend he had dysuria and was started on Omnicef for 5 days and then had sudden urinary retention. He was seen in the ER 3/27/18 and had figueredo catheter placed (1300 cc urine drained). He continues to have figueredo in place with leg bag and has ongoing good urine output with no gross hematuria noted. Two urine cultures show no bacterial growth to date, and he has completed his antibiotic course. He denies associated sinus congestion/ headache/N/V/D/bowel changes and denies known sick contacts. He denies any other changes with his medications or routines since last Friday.     Patient Active Problem List    Diagnosis Date Noted   • History of squamous cell carcinoma of skin 03/24/2018   • Primary insomnia 03/23/2018   • Low testosterone level in male 06/13/2017   • Essential hypertension 06/13/2017   • Tubular adenoma of colon 01/01/2014     Past medical, surgical, family, and social history was reviewed in Epic chart by me today.     Medications and allergies reviewed and updated in Epic chart by me today.     ROS:  Pertinent positives listed above in HPI. All other systems have been reviewed and are negative.    PE:   /62   Pulse (!) 104   Temp (!) 38.1 °C (100.6 °F)   Resp 16   Ht 1.854 m (6' 0.99\")   Wt 90.7 kg (200 lb)   SpO2 91%   BMI 26.39 kg/m²   Vital signs reviewed with patient.     Gen: Well developed; well nourished; no acute distress; age appropriate appearance   HEENT: Normocephalic; " atraumatic; PEERLA b/l; sclera clear b/l; b/l external auditory canals WNL; b/l TM WNL; nares patent;  oropharynx clear; oral mucosa moist; tongue midline; dentition adequate   Neck: No adenopathy; no thyromegaly  CV: Regular rate and rhythm; S1/ S2 present; no murmur, gallop or rub noted  Pulm: No respiratory distress; clear to ascultation b/l; no wheezing or stridor noted b/l; minor dry cough  Abd: Adequate bowel sounds noted; soft and nontender; no rebound, rigidity, nor distention  Extremities: No peripheral edema b/l LE extremities/ no clubbing nor cyanosis noted  Skin: Warm and sweating; no rashes noted   Neuro: No focal deficits noted   Psych: AAOx4; mood and affect are appropriate  : figueredo catheter in place with yellow urine in leg bag    A/P:  1. Fever, unspecified fever cause  POCT negative for influenza; Etiology of illness is unclear at this time - recommend patient start Levaquin today/ increase fluid intake/ increase rest/ continue Advil as needed and follow response.   - POCT Influenza A/B  - levoFLOXacin (LEVAQUIN) 500 MG tablet; Take 1 Tab by mouth every day for 7 days.  Dispense: 7 Tab; Refill: 0    2. Urinary retention  Temporary figueredo catheter is in place and draining well. POCT UA shows evidence of mild dehydration and small leukocyte esterase presence/ will send urine for culture and Levaquin will cover urinary source of infection. Pt is to maintain follow up with [facility] as scheduled.   - POCT Urinalysis  - URINE CULTURE(NEW); Future  - levoFLOXacin (LEVAQUIN) 500 MG tablet; Take 1 Tab by mouth every day for 7 days.  Dispense: 7 Tab; Refill: 0      I will call patient Friday morning to check on him and signs/symptoms that would warrant immediate evaluation in ER. No indication for labs or additional imaging at this time (will do if he does not improve in timely manner).

## 2018-04-01 LAB
BACTERIA UR CULT: NORMAL
SIGNIFICANT IND 70042: NORMAL
SITE SITE: NORMAL
SOURCE SOURCE: NORMAL

## 2018-04-26 ENCOUNTER — HOSPITAL ENCOUNTER (OUTPATIENT)
Dept: LAB | Facility: MEDICAL CENTER | Age: 63
End: 2018-04-26
Attending: PHYSICIAN ASSISTANT
Payer: COMMERCIAL

## 2018-04-26 LAB — PSA SERPL-MCNC: 5.33 NG/ML (ref 0–4)

## 2018-04-26 PROCEDURE — 84153 ASSAY OF PSA TOTAL: CPT

## 2018-06-06 ENCOUNTER — HOSPITAL ENCOUNTER (OUTPATIENT)
Dept: LAB | Facility: MEDICAL CENTER | Age: 63
End: 2018-06-06
Attending: PHYSICIAN ASSISTANT
Payer: COMMERCIAL

## 2018-06-06 ENCOUNTER — APPOINTMENT (OUTPATIENT)
Dept: INTERNAL MEDICINE | Facility: IMAGING CENTER | Age: 63
End: 2018-06-06
Payer: COMMERCIAL

## 2018-06-06 LAB — PSA SERPL-MCNC: 1.66 NG/ML (ref 0–4)

## 2018-06-06 PROCEDURE — 84153 ASSAY OF PSA TOTAL: CPT

## 2018-08-21 RX ORDER — LOSARTAN POTASSIUM 50 MG/1
50 TABLET ORAL DAILY
Qty: 90 TAB | Refills: 0 | Status: SHIPPED | OUTPATIENT
Start: 2018-08-21 | End: 2018-11-16 | Stop reason: SDUPTHER

## 2018-08-21 RX ORDER — AMLODIPINE BESYLATE 10 MG/1
10 TABLET ORAL DAILY
Qty: 90 TAB | Refills: 0 | Status: SHIPPED | OUTPATIENT
Start: 2018-08-21 | End: 2018-11-16 | Stop reason: SDUPTHER

## 2018-09-04 DIAGNOSIS — R79.89 LOW TESTOSTERONE LEVEL IN MALE: Chronic | ICD-10-CM

## 2018-09-04 DIAGNOSIS — Z00.00 HEALTH CARE MAINTENANCE: ICD-10-CM

## 2018-09-04 DIAGNOSIS — I10 ESSENTIAL HYPERTENSION: Chronic | ICD-10-CM

## 2018-09-05 ENCOUNTER — HOSPITAL ENCOUNTER (OUTPATIENT)
Facility: MEDICAL CENTER | Age: 63
End: 2018-09-05
Attending: FAMILY MEDICINE
Payer: COMMERCIAL

## 2018-09-05 ENCOUNTER — NON-PROVIDER VISIT (OUTPATIENT)
Dept: INTERNAL MEDICINE | Facility: IMAGING CENTER | Age: 63
End: 2018-09-05
Payer: COMMERCIAL

## 2018-09-05 DIAGNOSIS — Z00.00 HEALTH CARE MAINTENANCE: ICD-10-CM

## 2018-09-05 DIAGNOSIS — I10 ESSENTIAL HYPERTENSION: Chronic | ICD-10-CM

## 2018-09-05 DIAGNOSIS — Z01.89 ENCOUNTER FOR ROUTINE LABORATORY TESTING: ICD-10-CM

## 2018-09-05 DIAGNOSIS — R79.89 LOW TESTOSTERONE LEVEL IN MALE: Chronic | ICD-10-CM

## 2018-09-05 LAB
25(OH)D3 SERPL-MCNC: 58 NG/ML (ref 30–100)
ALBUMIN SERPL BCP-MCNC: 5.2 G/DL (ref 3.2–4.9)
ALBUMIN/GLOB SERPL: 2.2 G/DL
ALP SERPL-CCNC: 56 U/L (ref 30–99)
ALT SERPL-CCNC: 34 U/L (ref 2–50)
ANION GAP SERPL CALC-SCNC: 14 MMOL/L (ref 0–11.9)
AST SERPL-CCNC: 26 U/L (ref 12–45)
BASOPHILS # BLD AUTO: 0.8 % (ref 0–1.8)
BASOPHILS # BLD: 0.05 K/UL (ref 0–0.12)
BILIRUB SERPL-MCNC: 1.6 MG/DL (ref 0.1–1.5)
BUN SERPL-MCNC: 27 MG/DL (ref 8–22)
CALCIUM SERPL-MCNC: 10.2 MG/DL (ref 8.5–10.5)
CHLORIDE SERPL-SCNC: 104 MMOL/L (ref 96–112)
CHOLEST SERPL-MCNC: 228 MG/DL (ref 100–199)
CO2 SERPL-SCNC: 24 MMOL/L (ref 20–33)
CREAT SERPL-MCNC: 1.1 MG/DL (ref 0.5–1.4)
EOSINOPHIL # BLD AUTO: 0.54 K/UL (ref 0–0.51)
EOSINOPHIL NFR BLD: 8.2 % (ref 0–6.9)
ERYTHROCYTE [DISTWIDTH] IN BLOOD BY AUTOMATED COUNT: 45.7 FL (ref 35.9–50)
GLOBULIN SER CALC-MCNC: 2.4 G/DL (ref 1.9–3.5)
GLUCOSE SERPL-MCNC: 71 MG/DL (ref 65–99)
HCT VFR BLD AUTO: 55.3 % (ref 42–52)
HDLC SERPL-MCNC: 93 MG/DL
HGB BLD-MCNC: 18.3 G/DL (ref 14–18)
IMM GRANULOCYTES # BLD AUTO: 0.02 K/UL (ref 0–0.11)
IMM GRANULOCYTES NFR BLD AUTO: 0.3 % (ref 0–0.9)
LDLC SERPL CALC-MCNC: 118 MG/DL
LYMPHOCYTES # BLD AUTO: 1.84 K/UL (ref 1–4.8)
LYMPHOCYTES NFR BLD: 27.9 % (ref 22–41)
MCH RBC QN AUTO: 31.1 PG (ref 27–33)
MCHC RBC AUTO-ENTMCNC: 33.1 G/DL (ref 33.7–35.3)
MCV RBC AUTO: 93.9 FL (ref 81.4–97.8)
MONOCYTES # BLD AUTO: 0.48 K/UL (ref 0–0.85)
MONOCYTES NFR BLD AUTO: 7.3 % (ref 0–13.4)
NEUTROPHILS # BLD AUTO: 3.66 K/UL (ref 1.82–7.42)
NEUTROPHILS NFR BLD: 55.5 % (ref 44–72)
NRBC # BLD AUTO: 0 K/UL
NRBC BLD-RTO: 0 /100 WBC
PLATELET # BLD AUTO: 245 K/UL (ref 164–446)
PMV BLD AUTO: 10.7 FL (ref 9–12.9)
POTASSIUM SERPL-SCNC: 4 MMOL/L (ref 3.6–5.5)
PROT SERPL-MCNC: 7.6 G/DL (ref 6–8.2)
RBC # BLD AUTO: 5.89 M/UL (ref 4.7–6.1)
SODIUM SERPL-SCNC: 142 MMOL/L (ref 135–145)
TRIGL SERPL-MCNC: 86 MG/DL (ref 0–149)
TSH SERPL DL<=0.005 MIU/L-ACNC: 2.25 UIU/ML (ref 0.38–5.33)
WBC # BLD AUTO: 6.6 K/UL (ref 4.8–10.8)

## 2018-09-05 PROCEDURE — 85025 COMPLETE CBC W/AUTO DIFF WBC: CPT

## 2018-09-05 PROCEDURE — 82306 VITAMIN D 25 HYDROXY: CPT

## 2018-09-05 PROCEDURE — 84403 ASSAY OF TOTAL TESTOSTERONE: CPT

## 2018-09-05 PROCEDURE — 80061 LIPID PANEL: CPT

## 2018-09-05 PROCEDURE — 84270 ASSAY OF SEX HORMONE GLOBUL: CPT

## 2018-09-05 PROCEDURE — 84443 ASSAY THYROID STIM HORMONE: CPT

## 2018-09-05 PROCEDURE — 80053 COMPREHEN METABOLIC PANEL: CPT

## 2018-09-07 LAB
SHBG SERPL-SCNC: 45 NMOL/L (ref 11–80)
TESTOST FREE MFR SERPL: 1.6 % (ref 1.6–2.9)
TESTOST FREE SERPL-MCNC: 103 PG/ML (ref 47–244)
TESTOST SERPL-MCNC: 627 NG/DL (ref 300–720)

## 2018-10-01 ENCOUNTER — OFFICE VISIT (OUTPATIENT)
Dept: INTERNAL MEDICINE | Facility: IMAGING CENTER | Age: 63
End: 2018-10-01
Payer: COMMERCIAL

## 2018-10-01 VITALS
BODY MASS INDEX: 25.58 KG/M2 | DIASTOLIC BLOOD PRESSURE: 80 MMHG | HEART RATE: 63 BPM | OXYGEN SATURATION: 96 % | RESPIRATION RATE: 14 BRPM | HEIGHT: 73 IN | TEMPERATURE: 98.2 F | WEIGHT: 193 LBS | SYSTOLIC BLOOD PRESSURE: 140 MMHG

## 2018-10-01 DIAGNOSIS — Z00.00 WELLNESS EXAMINATION: ICD-10-CM

## 2018-10-01 DIAGNOSIS — R79.89 LOW TESTOSTERONE LEVEL IN MALE: Chronic | ICD-10-CM

## 2018-10-01 DIAGNOSIS — E78.2 MIXED DYSLIPIDEMIA: ICD-10-CM

## 2018-10-01 DIAGNOSIS — I10 ESSENTIAL HYPERTENSION: Chronic | ICD-10-CM

## 2018-10-01 DIAGNOSIS — R89.9 ABNORMAL LABORATORY TEST: ICD-10-CM

## 2018-10-01 DIAGNOSIS — N52.8 OTHER MALE ERECTILE DYSFUNCTION: ICD-10-CM

## 2018-10-01 DIAGNOSIS — Z85.828 HISTORY OF SQUAMOUS CELL CARCINOMA OF SKIN: ICD-10-CM

## 2018-10-01 DIAGNOSIS — D12.6 TUBULAR ADENOMA OF COLON: ICD-10-CM

## 2018-10-01 DIAGNOSIS — F51.01 PRIMARY INSOMNIA: Chronic | ICD-10-CM

## 2018-10-01 DIAGNOSIS — Z23 NEED FOR INFLUENZA VACCINATION: ICD-10-CM

## 2018-10-01 PROCEDURE — 90686 IIV4 VACC NO PRSV 0.5 ML IM: CPT | Performed by: FAMILY MEDICINE

## 2018-10-01 PROCEDURE — 90471 IMMUNIZATION ADMIN: CPT | Performed by: FAMILY MEDICINE

## 2018-10-01 PROCEDURE — 99396 PREV VISIT EST AGE 40-64: CPT | Mod: 25 | Performed by: FAMILY MEDICINE

## 2018-10-01 RX ORDER — ALPRAZOLAM 0.25 MG/1
0.25 TABLET ORAL NIGHTLY PRN
Qty: 30 TAB | Refills: 2 | Status: SHIPPED
Start: 2018-10-01 | End: 2018-10-31

## 2018-10-01 RX ORDER — TADALAFIL 20 MG/1
20 TABLET ORAL PRN
Qty: 10 TAB | Refills: 6 | Status: SHIPPED | OUTPATIENT
Start: 2018-10-01 | End: 2020-01-16

## 2018-10-01 ASSESSMENT — PATIENT HEALTH QUESTIONNAIRE - PHQ9: CLINICAL INTERPRETATION OF PHQ2 SCORE: 0

## 2018-10-01 NOTE — PROGRESS NOTES
Chief Complaint   Patient presents with   • Annual Exam   • Flu Vaccine       HPI:  Patient is a 62 y.o. male established patient who presents today for his annual wellness visit. He remains very busy with work and international travel and suffers from travel related primary insomnia controlled with low dose xanax use PRN. He uses this controlled medication in a safe manner and denies medication related side effects. He has chronic low testosterone managed with daily testosterone gel use, and chronic essential HTN on daily losartan and amlodipine. He is requesting both testosterone and xanax refills at this time. He also reports erectile dysfunction of unclear etiology and has used cialis 20mg in the past with good results. He would like a new prescription for cialis for ongoing use and would like to obtain annual flu shot today. He has not required follow up with [facility] since being treated for UTI with urinary retention in the Spring and reports no problems with bowel and bladder function at this time. He had labs drawn 9/5/18 and would like to review the results at our visit today. He noted that his lipid panel has worsened over the past year, and he attributes it to lack of exercise and less dietary restraint. He is in excellent spirits today and denies any other new health concerns.     Patient Active Problem List    Diagnosis Date Noted   • Mixed dyslipidemia 10/01/2018   • Other male erectile dysfunction 10/01/2018   • History of squamous cell carcinoma of skin 03/24/2018   • Primary insomnia 03/23/2018   • Low testosterone level in male 06/13/2017   • Essential hypertension 06/13/2017   • Tubular adenoma of colon 01/01/2014       Past medical, surgical, family, and social history was reviewed and updated in Epic chart by me today.     Medications and allergies reviewed and updated in Epic chart by me today.     Lab results 9/5/18 reviewed with patient at visit today.     ROS:  Pertinent positives listed  "above in HPI. All other systems have been reviewed and are negative.    PE:   /80 (BP Location: Left arm, Patient Position: Sitting, BP Cuff Size: Adult)   Pulse 63   Temp 36.8 °C (98.2 °F) (Temporal)   Resp 14   Ht 1.854 m (6' 1\")   Wt 87.5 kg (193 lb)   SpO2 96%   BMI 25.46 kg/m²   Vital signs reviewed with patient.     Repeat BP done by myself on right arm: 130/80    Gen: Well developed; well nourished; no acute distress; age appropriate appearance   HEENT: Normocephalic; atraumatic; PEERLA b/l; sclera clear b/l; b/l external auditory canals WNL; b/l TM WNL; nares patent; oropharynx clear; oral mucosa moist; tongue midline; dentition adequate   Neck: No adenopathy; no thyromegaly  CV: Regular rate and rhythm; S1/ S2 present; no murmur, gallop or rub noted  Pulm: No respiratory distress; clear to ascultation b/l; no wheezing or stridor noted b/l  Abd: Adequate bowel sounds noted; soft and nontender; no rebound, rigidity, nor distention  Extremities: No peripheral edema b/l LE extremities/ no clubbing nor cyanosis noted  Skin: Warm and dry; no rashes noted   Neuro: No focal deficits noted   Psych: AAOx4; mood and affect are appropriate    A/P:  1. Need for influenza vaccination  Vaccine administered at visit today.  - Flu Quad Inj >3 Year Pre-Filled PF    2. Essential hypertension  Stable with repeat blood pressure check later in the visit today. Pt is to continue daily losartan and amlodipine.      3. Mixed dyslipidemia  Uncontrolled/ pt is currently not on statin medication and would like to focus on lifestyle and dietary changes - will repeat fasting lipid panel in 4 months.   - LIPID PROFILE; Future    4. Abnormal laboratory test  Minor bilirubin and albumin elevation of unclear etiology. Will repeat CMP when pt has lipid panel drawn in four months.   - COMP METABOLIC PANEL; Future    5. Primary insomnia  Stable/ related to international travel/ Pt is to continue qhs PRN alprazolam for sleep.  " reviewed today and no concerns noted. Pt is well versed in safe use of controlled medication, and benefit of use outweighs risk at this time. New RX faxed to pharmacy.   - ALPRAZolam (XANAX) 0.25 MG Tab; Take 1 Tab by mouth at bedtime as needed for Sleep for up to 30 days.  Dispense: 30 Tab; Refill: 2    6. Tubular adenoma of colon  He will be due for colonoscopy in January and [facility] in [facility].     7. Other male erectile dysfunction  Pt is requesting new cialis prescription for ED control. He has used this medication in the past with good results. New prescription sent to pharmacy.   - tadalafil (CIALIS) 20 MG tablet; Take 1 Tab by mouth as needed for Erectile Dysfunction. Max: 1 dose per 24 hours.  Dispense: 10 Tab; Refill: 6    8. Wellness examination  Pt is stable at this time and is UTD with HCM items.     9. Low testosterone level in male  Stable/ pt is to continue current daily testosterone gel use/ will fax new RX to pharmacy (scanned into media section).  reviewed today and no concerns noted. Pt is well versed in safe use of controlled medication, and benefit of use outweighs risk at this time.     10. History of squamous cell carcinoma of skin  Pt has ongoing skin care at [facility].    He is doing well overall and will return in approximately four months for repeat fasting lipid panel/ CMP after instituting diet and lifestyle changes.

## 2018-11-19 RX ORDER — AMLODIPINE BESYLATE 10 MG/1
TABLET ORAL
Qty: 90 TAB | Refills: 3 | Status: SHIPPED | OUTPATIENT
Start: 2018-11-19 | End: 2019-11-30 | Stop reason: SDUPTHER

## 2018-11-19 RX ORDER — LOSARTAN POTASSIUM 50 MG/1
TABLET ORAL
Qty: 90 TAB | Refills: 3 | Status: SHIPPED | OUTPATIENT
Start: 2018-11-19 | End: 2019-11-30 | Stop reason: SDUPTHER

## 2019-02-13 ENCOUNTER — APPOINTMENT (RX ONLY)
Dept: URBAN - METROPOLITAN AREA CLINIC 35 | Facility: CLINIC | Age: 64
Setting detail: DERMATOLOGY
End: 2019-02-13

## 2019-02-13 DIAGNOSIS — L81.4 OTHER MELANIN HYPERPIGMENTATION: ICD-10-CM

## 2019-02-13 DIAGNOSIS — Z85.828 PERSONAL HISTORY OF OTHER MALIGNANT NEOPLASM OF SKIN: ICD-10-CM

## 2019-02-13 DIAGNOSIS — L57.0 ACTINIC KERATOSIS: ICD-10-CM

## 2019-02-13 DIAGNOSIS — L72.8 OTHER FOLLICULAR CYSTS OF THE SKIN AND SUBCUTANEOUS TISSUE: ICD-10-CM

## 2019-02-13 DIAGNOSIS — D22 MELANOCYTIC NEVI: ICD-10-CM

## 2019-02-13 PROBLEM — D22.5 MELANOCYTIC NEVI OF TRUNK: Status: ACTIVE | Noted: 2019-02-13

## 2019-02-13 PROBLEM — D48.5 NEOPLASM OF UNCERTAIN BEHAVIOR OF SKIN: Status: ACTIVE | Noted: 2019-02-13

## 2019-02-13 PROBLEM — I10 ESSENTIAL (PRIMARY) HYPERTENSION: Status: ACTIVE | Noted: 2019-02-13

## 2019-02-13 PROCEDURE — 99213 OFFICE O/P EST LOW 20 MIN: CPT | Mod: 25

## 2019-02-13 PROCEDURE — ? LIQUID NITROGEN

## 2019-02-13 PROCEDURE — ? COUNSELING

## 2019-02-13 PROCEDURE — 17003 DESTRUCT PREMALG LES 2-14: CPT

## 2019-02-13 PROCEDURE — ? BIOPSY BY PUNCH METHOD

## 2019-02-13 PROCEDURE — 17000 DESTRUCT PREMALG LESION: CPT | Mod: 59

## 2019-02-13 PROCEDURE — 11104 PUNCH BX SKIN SINGLE LESION: CPT

## 2019-02-13 PROCEDURE — ? OBSERVATION AND MEASURE

## 2019-02-13 ASSESSMENT — LOCATION DETAILED DESCRIPTION DERM
LOCATION DETAILED: RIGHT DISTAL CALF
LOCATION DETAILED: LEFT TRIANGULAR FOSSA
LOCATION DETAILED: SUPERIOR THORACIC SPINE
LOCATION DETAILED: LEFT MEDIAL UPPER BACK
LOCATION DETAILED: SUPERIOR LUMBAR SPINE
LOCATION DETAILED: LEFT CENTRAL MALAR CHEEK
LOCATION DETAILED: EPIGASTRIC SKIN
LOCATION DETAILED: LEFT INFERIOR MEDIAL MIDBACK
LOCATION DETAILED: RIGHT ANTERIOR EARLOBE

## 2019-02-13 ASSESSMENT — LOCATION ZONE DERM
LOCATION ZONE: TRUNK
LOCATION ZONE: FACE
LOCATION ZONE: LEG
LOCATION ZONE: EAR

## 2019-02-13 ASSESSMENT — LOCATION SIMPLE DESCRIPTION DERM
LOCATION SIMPLE: RIGHT CALF
LOCATION SIMPLE: LEFT UPPER BACK
LOCATION SIMPLE: LEFT CHEEK
LOCATION SIMPLE: UPPER BACK
LOCATION SIMPLE: RIGHT EAR
LOCATION SIMPLE: ABDOMEN
LOCATION SIMPLE: LEFT EAR
LOCATION SIMPLE: LEFT LOWER BACK
LOCATION SIMPLE: LOWER BACK

## 2019-02-13 NOTE — PROCEDURE: LIQUID NITROGEN
Consent: The patient's consent was obtained including but not limited to risks of crusting, scabbing, blistering, scarring, darker or lighter pigmentary change, recurrence, incomplete removal and infection.
Post-Care Instructions: I reviewed with the patient in detail post-care instructions. Patient is to wear sunprotection, and avoid picking at any of the treated lesions. Pt may apply Vaseline to crusted or scabbing areas.
Duration Of Freeze Thaw-Cycle (Seconds): 2
Detail Level: Detailed
Render Post-Care Instructions In Note?: no
Number Of Freeze-Thaw Cycles: 2 freeze-thaw cycles

## 2019-02-13 NOTE — PROCEDURE: BIOPSY BY PUNCH METHOD
Home Suture Removal Text: Patient was provided a home suture removal kit and will remove their sutures at home.  If they have any questions or difficulties they will call the office.
Consent: Written consent was obtained and risks were reviewed including but not limited to scarring, infection, bleeding, scabbing, incomplete removal, nerve damage and allergy to anesthesia.
Anesthesia Volume In Cc: 0.5
Size Of Lesion In Cm (Optional): 0
Bill For Surgical Tray: no
Lab Facility: 
Biopsy Type: H and E
Wound Care: Petrolatum
Suture Removal: 5 days
Notification Instructions: Patient will be notified of biopsy results. However, patient instructed to call the office if not contacted within 2 weeks.
Detail Level: Detailed
Render Post-Care Instructions In Note?: yes
Epidermal Sutures: 6-0 Nylon
Punch Size In Mm: 2
Hemostasis: None
Anesthesia Type: 1% lidocaine with epinephrine and a 1:10 solution of 8.4% sodium bicarbonate
Billing Type: Third-Party Bill
Dressing: Band-Aid
Lab: [id number]
Post-Care Instructions: I reviewed with the patient in detail post-care instructions.  Patient to cleanse area with mild soap and water, apply Vaseline to area x 14 days. Patient may apply hydrogen peroxide soaks to remove any crusting.

## 2019-02-19 ENCOUNTER — APPOINTMENT (RX ONLY)
Dept: URBAN - METROPOLITAN AREA CLINIC 35 | Facility: CLINIC | Age: 64
Setting detail: DERMATOLOGY
End: 2019-02-19

## 2019-02-19 DIAGNOSIS — Z48.02 ENCOUNTER FOR REMOVAL OF SUTURES: ICD-10-CM

## 2019-02-19 PROCEDURE — 99024 POSTOP FOLLOW-UP VISIT: CPT

## 2019-02-19 PROCEDURE — ? SUTURE REMOVAL (GLOBAL PERIOD)

## 2019-02-19 ASSESSMENT — LOCATION ZONE DERM: LOCATION ZONE: FACE

## 2019-02-19 ASSESSMENT — LOCATION SIMPLE DESCRIPTION DERM: LOCATION SIMPLE: RIGHT FOREHEAD

## 2019-02-19 ASSESSMENT — LOCATION DETAILED DESCRIPTION DERM: LOCATION DETAILED: RIGHT SUPERIOR FOREHEAD

## 2019-02-19 NOTE — PROCEDURE: SUTURE REMOVAL (GLOBAL PERIOD)
Detail Level: Simple
Add 10706 Cpt? (Important Note: In 2017 The Use Of 74581 Is Being Tracked By Cms To Determine Future Global Period Reimbursement For Global Periods): yes

## 2019-02-19 NOTE — HPI: SUTURE REMOVAL
Additional History: Caryn gave patient path results and discussed referring patient to [facility] for mohs.

## 2019-02-28 ENCOUNTER — APPOINTMENT (RX ONLY)
Dept: URBAN - METROPOLITAN AREA CLINIC 20 | Facility: CLINIC | Age: 64
Setting detail: DERMATOLOGY
End: 2019-02-28

## 2019-02-28 PROBLEM — C44.319 BASAL CELL CARCINOMA OF SKIN OF OTHER PARTS OF FACE: Status: ACTIVE | Noted: 2019-02-28

## 2019-02-28 PROCEDURE — ? MOHS SURGERY PHONE CONSULTATION

## 2019-02-28 NOTE — PROCEDURE: MOHS SURGERY PHONE CONSULTATION
Does The Patient Take Blood Thinners?: No
Detail Level: Simple
Patient Reported Location: forehead
Date Of Mohs Surgery: 03/20/2019
Which Antibiotic Do They Take For Surgical Prophylaxis?: Amoxicillin (2 grams)
Has The Pathology Report Been Received?: Yes
Patient's Insurance: [facility]
Referring Provider: Caryn Saenz

## 2019-03-20 ENCOUNTER — APPOINTMENT (RX ONLY)
Dept: URBAN - METROPOLITAN AREA CLINIC 36 | Facility: CLINIC | Age: 64
Setting detail: DERMATOLOGY
End: 2019-03-20

## 2019-03-20 VITALS — DIASTOLIC BLOOD PRESSURE: 96 MMHG | HEART RATE: 63 BPM | SYSTOLIC BLOOD PRESSURE: 151 MMHG

## 2019-03-20 DIAGNOSIS — L57.8 OTHER SKIN CHANGES DUE TO CHRONIC EXPOSURE TO NONIONIZING RADIATION: ICD-10-CM

## 2019-03-20 PROBLEM — C44.319 BASAL CELL CARCINOMA OF SKIN OF OTHER PARTS OF FACE: Status: ACTIVE | Noted: 2019-03-20

## 2019-03-20 PROCEDURE — 17311 MOHS 1 STAGE H/N/HF/G: CPT

## 2019-03-20 PROCEDURE — ? COUNSELING

## 2019-03-20 PROCEDURE — 13131 CMPLX RPR F/C/C/M/N/AX/G/H/F: CPT

## 2019-03-20 PROCEDURE — ? MOHS SURGERY

## 2019-03-20 NOTE — HPI: PROCEDURE (MOHS)
Has The Growth Been Previously Biopsied?: has been previously biopsied
Additional History: \\nReferral from Caryn Saenz NP.

## 2019-03-20 NOTE — PROCEDURE: MOHS SURGERY
Quadrants Reporting?: 0
Plastic Surgeon Procedure Text (B): After obtaining clear surgical margins the patient was sent to plastics for surgical repair.  The patient understands they will receive post-surgical care and follow-up from the referring physician's office.
Quadrant Reporting?: no
Complex Repair Preamble Text (Leave Blank If You Do Not Want): Extensive wide undermining was performed.
No Repair - Repaired With Adjacent Surgical Defect Text (Leave Blank If You Do Not Want): After obtaining clear surgical margins the defect was repaired concurrently with another surgical defect which was in close approximation.
Surgical Defect Width In Cm (Optional): 1
Bi-Rhombic Flap Text: The defect edges were debeveled with a #15 scalpel blade.  Given the location of the defect and the proximity to free margins a bi-rhombic flap was deemed most appropriate.  Using a sterile surgical marker, an appropriate rhombic flap was drawn incorporating the defect. The area thus outlined was incised deep to adipose tissue with a #15 scalpel blade.  The skin margins were undermined to an appropriate distance in all directions utilizing iris scissors.
Island Pedicle Flap Text: The defect edges were debeveled with a #15 scalpel blade.  Given the location of the defect, shape of the defect and the proximity to free margins an island pedicle advancement flap was deemed most appropriate.  Using a sterile surgical marker, an appropriate advancement flap was drawn incorporating the defect, outlining the appropriate donor tissue and placing the expected incisions within the relaxed skin tension lines where possible.    The area thus outlined was incised deep to adipose tissue with a #15 scalpel blade.  The skin margins were undermined to an appropriate distance in all directions around the primary defect and laterally outward around the island pedicle utilizing iris scissors.  There was minimal undermining beneath the pedicle flap.
Postop Diagnosis: same
Epidermal Autograft Text: The defect edges were debeveled with a #15 scalpel blade.  Given the location of the defect, shape of the defect and the proximity to free margins an epidermal autograft was deemed most appropriate.  Using a sterile surgical marker, the primary defect shape was transferred to the donor site. The epidermal graft was then harvested.  The skin graft was then placed in the primary defect and oriented appropriately.
Area H Indication Text: Tumors in this location are included in Area H (eyelids, eyebrows, nose, lips, chin, ear, pre-auricular, post-auricular, temple, genitalia, hands, feet, ankles and areola).  Tissue conservation is critical in these anatomic locations.
Incorporate Mauc Into Note After Indications: Yes
Location Indication Override (Is Already Calculated Based On Selected Body Location): Area M
Ear Star Wedge Flap Text: The defect edges were debeveled with a #15 blade scalpel.  Given the location of the defect and the proximity to free margins (helical rim) an ear star wedge flap was deemed most appropriate.  Using a sterile surgical marker, the appropriate flap was drawn incorporating the defect and placing the expected incisions between the helical rim and antihelix where possible.  The area thus outlined was incised through and through with a #15 scalpel blade.
Stage 8: Additional Anesthesia Type: 1% lidocaine with epinephrine
Non-Graft Cartilage Fenestration Text: The cartilage was fenestrated with a 2mm punch biopsy to help facilitate healing.
Transposition Flap Text: The defect edges were debeveled with a #15 scalpel blade.  Given the location of the defect and the proximity to free margins a transposition flap was deemed most appropriate.  Using a sterile surgical marker, an appropriate transposition flap was drawn incorporating the defect.    The area thus outlined was incised deep to adipose tissue with a #15 scalpel blade.  The skin margins were undermined to an appropriate distance in all directions utilizing iris scissors.
Purse String (Simple) Text: Given the location of the defect and the characteristics of the surrounding skin a purse string closure was deemed most appropriate.  Undermining was performed circumfirentially around the surgical defect.  A purse string suture was then placed and tightened.
Oculoplastic Surgeon Procedure Text (D): After obtaining clear surgical margins the patient was sent to oculoplastics for surgical repair.  The patient understands they will receive post-surgical care and follow-up from the referring physician's office.
Epidermal Closure Graft Donor Site (Optional): running
Localized Dermabrasion With Wire Brush Text: The patient was draped in routine manner.  Localized dermabrasion using 3 x 17 mm wire brush was performed in routine manner to papillary dermis. This spot dermabrasion is being performed to complete skin cancer reconstruction. It also will eliminate the other sun damaged precancerous cells that are known to be part of the regional effect of a lifetime's worth of sun exposure. This localized dermabrasion is therapeutic and should not be considered cosmetic in any regard.
Xenograft Text: The defect edges were debeveled with a #15 scalpel blade.  Given the location of the defect, shape of the defect and the proximity to free margins a xenograft was deemed most appropriate.  The graft was then trimmed to fit the size of the defect.  The graft was then placed in the primary defect and oriented appropriately.
Hemostasis: Electrocautery
O-T Plasty Text: The defect edges were debeveled with a #15 scalpel blade.  Given the location of the defect, shape of the defect and the proximity to free margins an O-T plasty was deemed most appropriate.  Using a sterile surgical marker, an appropriate O-T plasty was drawn incorporating the defect and placing the expected incisions within the relaxed skin tension lines where possible.    The area thus outlined was incised deep to adipose tissue with a #15 scalpel blade.  The skin margins were undermined to an appropriate distance in all directions utilizing iris scissors.
Cartilage Graft Text: The defect edges were debeveled with a #15 scalpel blade.  Given the location of the defect, shape of the defect, the fact the defect involved a full thickness cartilage defect a cartilage graft was deemed most appropriate.  An appropriate donor site was identified, cleansed, and anesthetized. The cartilage graft was then harvested and transferred to the recipient site, oriented appropriately and then sutured into place.  The secondary defect was then repaired using a primary closure.
Area L Indication Text: Tumors in this location are included in Area L (trunk and extremities).  Mohs surgery is indicated for larger tumors, or tumors with aggressive histologic features, in these anatomic locations.
Suture Removal: 7 days
Surgeon: Crystal Noel MD
Consent (Ear)/Introductory Paragraph: The rationale for Mohs was explained to the patient and consent was obtained. The risks, benefits and alternatives to therapy were discussed in detail. Specifically, the risks of ear deformity, infection, scarring, bleeding, prolonged wound healing, incomplete removal, allergy to anesthesia, nerve injury and recurrence were addressed. Prior to the procedure, the treatment site was clearly identified and confirmed by the patient. All components of Universal Protocol/PAUSE Rule completed.
Wound Care (No Sutures): Petrolatum
Modified Advancement Flap Text: The defect edges were debeveled with a #15 scalpel blade.  Given the location of the defect, shape of the defect and the proximity to free margins a modified advancement flap was deemed most appropriate.  Using a sterile surgical marker, an appropriate advancement flap was drawn incorporating the defect and placing the expected incisions within the relaxed skin tension lines where possible.    The area thus outlined was incised deep to adipose tissue with a #15 scalpel blade.  The skin margins were undermined to an appropriate distance in all directions utilizing iris scissors.
Dressing (No Sutures): pressure dressing with telfa
Bilateral Helical Rim Advancement Flap Text: The defect edges were debeveled with a #15 blade scalpel.  Given the location of the defect and the proximity to free margins (helical rim) a bilateral helical rim advancement flap was deemed most appropriate.  Using a sterile surgical marker, the appropriate advancement flaps were drawn incorporating the defect and placing the expected incisions between the helical rim and antihelix where possible.  The area thus outlined was incised through and through with a #15 scalpel blade.  With a skin hook and iris scissors, the flaps were gently and sharply undermined and freed up.
Provider Procedure Text (E): After obtaining clear surgical margins the defect was repaired by another provider.
Deep Sutures: 5-0 Polysorb
Trilobed Flap Text: The defect edges were debeveled with a #15 scalpel blade.  Given the location of the defect and the proximity to free margins a trilobed flap was deemed most appropriate.  Using a sterile surgical marker, an appropriate trilobed flap drawn around the defect.    The area thus outlined was incised deep to adipose tissue with a #15 scalpel blade.  The skin margins were undermined to an appropriate distance in all directions utilizing iris scissors.
Subsequent Stages Histo Method Verbiage: Using a similar technique to that described above, a thin layer of tissue was removed from all areas where tumor was visible on the previous stage.  The tissue was again oriented, mapped, dyed, and processed as above.
Cheiloplasty (Complex) Text: A decision was made to reconstruct the defect with a  cheiloplasty.  The defect was undermined extensively.  Additional obicularis oris muscle was excised with a 15 blade scalpel.  The defect was converted into a full thickness wedge to facilite a better cosmetic result.  Small vessels were then tied off with 5-0 monocyrl. The obicularis oris, superficial fascia, adipose and dermis were then reapproximated.  After the deeper layers were approximated the epidermis was reapproximated with particular care given to realign the vermilion border.
Closure 3 Information: This tab is for additional flaps and grafts above and beyond our usual structured repairs.  Please note if you enter information here it will not currently bill and you will need to add the billing information manually.
Cheek Interpolation Flap Text: A decision was made to reconstruct the defect utilizing an interpolation axial flap and a staged reconstruction.  A telfa template was made of the defect.  This telfa template was then used to outline the Cheek Interpolation flap.  The donor area for the pedicle flap was then injected with anesthesia.  The flap was excised through the skin and subcutaneous tissue down to the layer of the underlying musculature.  The interpolation flap was carefully excised within this deep plane to maintain its blood supply.  The edges of the donor site were undermined.   The donor site was closed in a primary fashion.  The pedicle was then rotated into position and sutured.  Once the tube was sutured into place, adequate blood supply was confirmed with blanching and refill.  The pedicle was then wrapped with xeroform gauze and dressed appropriately with a telfa and gauze bandage to ensure continued blood supply and protect the attached pedicle.
Home Suture Removal Text: Patient was provided instructions on removing sutures and will remove their sutures at home.  If they have any questions or difficulties they will call the office.
Intermediate Repair Preamble Text (Leave Blank If You Do Not Want): Undermining was performed with blunt dissection.
Post-Care Instructions: I reviewed with the patient in detail post-care instructions. Patient is not to engage in any heavy lifting, exercise, or swimming for the next 14 days. Should the patient develop any fevers, chills, bleeding, severe pain patient will contact the office immediately.
Surgeon/Pathologist Verbiage (Will Incorporate Name Of Surgeon From Intro If Not Blank): operated in two distinct and integrated capacities as the surgeon and pathologist.
Keystone Flap Text: The defect edges were debeveled with a #15 scalpel blade.  Given the location of the defect, shape of the defect a keystone flap was deemed most appropriate.  Using a sterile surgical marker, an appropriate keystone flap was drawn incorporating the defect, outlining the appropriate donor tissue and placing the expected incisions within the relaxed skin tension lines where possible. The area thus outlined was incised deep to adipose tissue with a #15 scalpel blade.  The skin margins were undermined to an appropriate distance in all directions around the primary defect and laterally outward around the flap utilizing iris scissors.
Mohs Case Number: [id number]
Information: Selecting Yes will display possible errors in your note based on the variables you have selected. This validation is only offered as a suggestion for you. PLEASE NOTE THAT THE VALIDATION TEXT WILL BE REMOVED WHEN YOU FINALIZE YOUR NOTE. IF YOU WANT TO FAX A PRELIMINARY NOTE YOU WILL NEED TO TOGGLE THIS TO 'NO' IF YOU DO NOT WANT IT IN YOUR FAXED NOTE.
Composite Graft Text: The defect edges were debeveled with a #15 scalpel blade.  Given the location of the defect, shape of the defect, the proximity to free margins and the fact the defect was full thickness a composite graft was deemed most appropriate.  The defect was outline and then transferred to the donor site.  A full thickness graft was then excised from the donor site. The graft was then placed in the primary defect, oriented appropriately and then sutured into place.  The secondary defect was then repaired using a primary closure.
Previous Accession (Optional): [id number]
Partial Purse String (Simple) Text: Given the location of the defect and the characteristics of the surrounding skin a simple purse string closure was deemed most appropriate.  Undermining was performed circumfirentially around the surgical defect.  A purse string suture was then placed and tightened. Wound tension only allowed a partial closure of the circular defect.
Z Plasty Text: The lesion was extirpated to the level of the fat with a #15 scalpel blade.  Given the location of the defect, shape of the defect and the proximity to free margins a Z-plasty was deemed most appropriate for repair.  Using a sterile surgical marker, the appropriate transposition arms of the Z-plasty were drawn incorporating the defect and placing the expected incisions within the relaxed skin tension lines where possible.    The area thus outlined was incised deep to adipose tissue with a #15 scalpel blade.  The skin margins were undermined to an appropriate distance in all directions utilizing iris scissors.  The opposing transposition arms were then transposed into place in opposite direction and anchored with interrupted buried subcutaneous sutures.
Referred To Asc For Closure Text (Leave Blank If You Do Not Want): After obtaining clear surgical margins the patient was sent to an ASC for surgical repair.  The patient understands they will receive post-surgical care and follow-up from the ASC physician.
Otolaryngologist Procedure Text (D): After obtaining clear surgical margins the patient was sent to otolaryngology for surgical repair.  The patient understands they will receive post-surgical care and follow-up from the referring physician's office.
Manual Repair Warning Statement: We plan on removing the manually selected variable below in favor of our much easier automatic structured text blocks found in the previous tab. We decided to do this to help make the flow better and give you the full power of structured data. Manual selection is never going to be ideal in our platform and I would encourage you to avoid using manual selection from this point on, especially since I will be sunsetting this feature. It is important that you do one of two things with the customized text below. First, you can save all of the text in a word file so you can have it for future reference. Second, transfer the text to the appropriate area in the Library tab. Lastly, if there is a flap or graft type which we do not have you need to let us know right away so I can add it in before the variable is hidden. No need to panic, we plan to give you roughly 6 months to make the change.
Crescentic Advancement Flap Text: The defect edges were debeveled with a #15 scalpel blade.  Given the location of the defect and the proximity to free margins a crescentic advancement flap was deemed most appropriate.  Using a sterile surgical marker, the appropriate advancement flap was drawn incorporating the defect and placing the expected incisions within the relaxed skin tension lines where possible.    The area thus outlined was incised deep to adipose tissue with a #15 scalpel blade.  The skin margins were undermined to an appropriate distance in all directions utilizing iris scissors.
Secondary Intention Text (Leave Blank If You Do Not Want): The defect will heal with secondary intention.
Hatchet Flap Text: The defect edges were debeveled with a #15 scalpel blade.  Given the location of the defect, shape of the defect and the proximity to free margins a hatchet flap was deemed most appropriate.  Using a sterile surgical marker, an appropriate hatchet flap was drawn incorporating the defect and placing the expected incisions within the relaxed skin tension lines where possible.    The area thus outlined was incised deep to adipose tissue with a #15 scalpel blade.  The skin margins were undermined to an appropriate distance in all directions utilizing iris scissors.
Same Histology In Subsequent Stages Text: The pattern and morphology of the tumor is as described in the first stage.
Cheiloplasty (Less Than 50%) Text: A decision was made to reconstruct the defect with a  cheiloplasty.  The defect was undermined extensively.  Additional obicularis oris muscle was excised with a 15 blade scalpel.  The defect was converted into a full thickness wedge, of less than 50% of the vertical height of the lip, to facilite a better cosmetic result.  Small vessels were then tied off with 5-0 monocyrl. The obicularis oris, superficial fascia, adipose and dermis were then reapproximated.  After the deeper layers were approximated the epidermis was reapproximated with particular care given to realign the vermilion border.
V-Y Flap Text: The defect edges were debeveled with a #15 scalpel blade.  Given the location of the defect, shape of the defect and the proximity to free margins a V-Y flap was deemed most appropriate.  Using a sterile surgical marker, an appropriate advancement flap was drawn incorporating the defect and placing the expected incisions within the relaxed skin tension lines where possible.    The area thus outlined was incised deep to adipose tissue with a #15 scalpel blade.  The skin margins were undermined to an appropriate distance in all directions utilizing iris scissors.
Consent (Nose)/Introductory Paragraph: The rationale for Mohs was explained to the patient and consent was obtained. The risks, benefits and alternatives to therapy were discussed in detail. Specifically, the risks of nasal deformity, changes in the flow of air through the nose, infection, scarring, bleeding, prolonged wound healing, incomplete removal, allergy to anesthesia, nerve injury and recurrence were addressed. Prior to the procedure, the treatment site was clearly identified and confirmed by the patient. All components of Universal Protocol/PAUSE Rule completed.
Dorsal Nasal Flap Text: The defect edges were debeveled with a #15 scalpel blade.  Given the location of the defect and the proximity to free margins a dorsal nasal flap was deemed most appropriate.  Using a sterile surgical marker, an appropriate dorsal nasal flap was drawn around the defect.    The area thus outlined was incised deep to adipose tissue with a #15 scalpel blade.  The skin margins were undermined to an appropriate distance in all directions utilizing iris scissors.
O-T Advancement Flap Text: The defect edges were debeveled with a #15 scalpel blade.  Given the location of the defect, shape of the defect and the proximity to free margins an O-T advancement flap was deemed most appropriate.  Using a sterile surgical marker, an appropriate advancement flap was drawn incorporating the defect and placing the expected incisions within the relaxed skin tension lines where possible.    The area thus outlined was incised deep to adipose tissue with a #15 scalpel blade.  The skin margins were undermined to an appropriate distance in all directions utilizing iris scissors.
Mohs Histo Method Verbiage: Each section was then chromacoded and processed in the Mohs lab using the Mohs protocol and submitted for frozen section.
Bcc Infiltrative Histology Text: There were numerous aggregates of basaloid cells demonstrating an infiltrative pattern.
Consent (Lip)/Introductory Paragraph: The rationale for Mohs was explained to the patient and consent was obtained. The risks, benefits and alternatives to therapy were discussed in detail. Specifically, the risks of lip deformity, changes in the oral aperture, infection, scarring, bleeding, prolonged wound healing, incomplete removal, allergy to anesthesia, nerve injury and recurrence were addressed. Prior to the procedure, the treatment site was clearly identified and confirmed by the patient. All components of Universal Protocol/PAUSE Rule completed.
X Size Of Lesion In Cm (Optional): 0.6
Closure 2 Information: This tab is for additional flaps and grafts, including complex repair and grafts and complex repair and flaps. You can also specify a different location for the additional defect, if the location is the same you do not need to select a new one. We will insert the automated text for the repair you select below just as we do for solitary flaps and grafts. Please note that at this time if you select a location with a different insurance zone you will need to override the ICD10 and CPT if appropriate.
Consent (Near Eyelid Margin)/Introductory Paragraph: The rationale for Mohs was explained to the patient and consent was obtained. The risks, benefits and alternatives to therapy were discussed in detail. Specifically, the risks of ectropion or eyelid deformity, infection, scarring, bleeding, prolonged wound healing, incomplete removal, allergy to anesthesia, nerve injury and recurrence were addressed. Prior to the procedure, the treatment site was clearly identified and confirmed by the patient. All components of Universal Protocol/PAUSE Rule completed.
Area M Indication Text: Tumors in this location are included in Area M (cheek, forehead, scalp, neck, jawline and pretibial skin).  Mohs surgery is indicated for tumors in these anatomic locations.
Mohs Rapid Report Verbiage: The area of clinically evident tumor was marked with skin marking ink and appropriately hatched.  The initial incision was made following the Mohs approach through the skin.  The specimen was taken to the lab, divided into the necessary number of pieces, chromacoded and processed according to the Mohs protocol.  This was repeated in successive stages until a tumor free defect was achieved.
Inflammation Suggestive Of Cancer Camouflage Histology Text: There was a dense lymphocytic infiltrate which prevented adequate histologic evaluation of adjacent structures.
Mid-Level Procedure Text (C): After obtaining clear surgical margins the patient was sent to a mid-level provider for surgical repair.  The patient understands they will receive post-surgical care and follow-up from the mid-level provider.
Skin Substitute Text: The defect edges were debeveled with a #15 scalpel blade.  Given the location of the defect, shape of the defect and the proximity to free margins a skin substitute graft was deemed most appropriate.  The graft material was trimmed to fit the size of the defect. The graft was then placed in the primary defect and oriented appropriately.
Double Island Pedicle Flap Text: The defect edges were debeveled with a #15 scalpel blade.  Given the location of the defect, shape of the defect and the proximity to free margins a double island pedicle advancement flap was deemed most appropriate.  Using a sterile surgical marker, an appropriate advancement flap was drawn incorporating the defect, outlining the appropriate donor tissue and placing the expected incisions within the relaxed skin tension lines where possible.    The area thus outlined was incised deep to adipose tissue with a #15 scalpel blade.  The skin margins were undermined to an appropriate distance in all directions around the primary defect and laterally outward around the island pedicle utilizing iris scissors.  There was minimal undermining beneath the pedicle flap.
Consent (Marginal Mandibular)/Introductory Paragraph: The rationale for Mohs was explained to the patient and consent was obtained. The risks, benefits and alternatives to therapy were discussed in detail. Specifically, the risks of damage to the marginal mandibular branch of the facial nerve, infection, scarring, bleeding, prolonged wound healing, incomplete removal, allergy to anesthesia, and recurrence were addressed. Prior to the procedure, the treatment site was clearly identified and confirmed by the patient. All components of Universal Protocol/PAUSE Rule completed.
Graft Cartilage Fenestration Text: The cartilage was fenestrated with a 2mm punch biopsy to help facilitate graft survival and healing.
Partial Purse String (Intermediate) Text: Given the location of the defect and the characteristics of the surrounding skin an intermediate purse string closure was deemed most appropriate.  Undermining was performed circumfirentially around the surgical defect.  A purse string suture was then placed and tightened. Wound tension only allowed a partial closure of the circular defect.
Bilobed Transposition Flap Text: The defect edges were debeveled with a #15 scalpel blade.  Given the location of the defect and the proximity to free margins a bilobed transposition flap was deemed most appropriate.  Using a sterile surgical marker, an appropriate bilobe flap drawn around the defect.    The area thus outlined was incised deep to adipose tissue with a #15 scalpel blade.  The skin margins were undermined to an appropriate distance in all directions utilizing iris scissors.
No Residual Tumor Seen Histology Text: There were no malignant cells seen in the sections examined.
Split-Thickness Skin Graft Text: The defect edges were debeveled with a #15 scalpel blade.  Given the location of the defect, shape of the defect and the proximity to free margins a split thickness skin graft was deemed most appropriate.  Using a sterile surgical marker, the primary defect shape was transferred to the donor site. The split thickness graft was then harvested.  The skin graft was then placed in the primary defect and oriented appropriately.
Epidermal Closure: running cuticular
Additional Anesthesia Volume In Cc: 6
Repair Anesthesia Method: local infiltration
Rotation Flap Text: The defect edges were debeveled with a #15 scalpel blade.  Given the location of the defect, shape of the defect and the proximity to free margins a rotation flap was deemed most appropriate.  Using a sterile surgical marker, an appropriate rotation flap was drawn incorporating the defect and placing the expected incisions within the relaxed skin tension lines where possible.    The area thus outlined was incised deep to adipose tissue with a #15 scalpel blade.  The skin margins were undermined to an appropriate distance in all directions utilizing iris scissors.
Bcc Histology Text: There were numerous aggregates of basaloid cells.
Muscle Hinge Flap Text: The defect edges were debeveled with a #15 scalpel blade.  Given the size, depth and location of the defect and the proximity to free margins a muscle hinge flap was deemed most appropriate.  Using a sterile surgical marker, an appropriate hinge flap was drawn incorporating the defect. The area thus outlined was incised with a #15 scalpel blade.  The skin margins were undermined to an appropriate distance in all directions utilizing iris scissors.
Consent 3/Introductory Paragraph: I gave the patient a chance to ask questions they had about the procedure.  Following this I explained the Mohs procedure and consent was obtained. The risks, benefits and alternatives to therapy were discussed in detail. Specifically, the risks of infection, scarring, bleeding, prolonged wound healing, incomplete removal, allergy to anesthesia, nerve injury and recurrence were addressed. Prior to the procedure, the treatment site was clearly identified and confirmed by the patient. All components of Universal Protocol/PAUSE Rule completed.
Referring Physician (Optional): Caryn Saenz NP
Spiral Flap Text: The defect edges were debeveled with a #15 scalpel blade.  Given the location of the defect, shape of the defect and the proximity to free margins a spiral flap was deemed most appropriate.  Using a sterile surgical marker, an appropriate rotation flap was drawn incorporating the defect and placing the expected incisions within the relaxed skin tension lines where possible. The area thus outlined was incised deep to adipose tissue with a #15 scalpel blade.  The skin margins were undermined to an appropriate distance in all directions utilizing iris scissors.
S Plasty Text: Given the location and shape of the defect, and the orientation of relaxed skin tension lines, an S-plasty was deemed most appropriate for repair.  Using a sterile surgical marker, the appropriate outline of the S-plasty was drawn, incorporating the defect and placing the expected incisions within the relaxed skin tension lines where possible.  The area thus outlined was incised deep to adipose tissue with a #15 scalpel blade.  The skin margins were undermined to an appropriate distance in all directions utilizing iris scissors. The skin flaps were advanced over the defect.  The opposing margins were then approximated with interrupted buried subcutaneous sutures.
Rhombic Flap Text: The defect edges were debeveled with a #15 scalpel blade.  Given the location of the defect and the proximity to free margins a rhombic flap was deemed most appropriate.  Using a sterile surgical marker, an appropriate rhombic flap was drawn incorporating the defect.    The area thus outlined was incised deep to adipose tissue with a #15 scalpel blade.  The skin margins were undermined to an appropriate distance in all directions utilizing iris scissors.
Mucosal Advancement Flap Text: Given the location of the defect, shape of the defect and the proximity to free margins a mucosal advancement flap was deemed most appropriate. Incisions were made with a 15 blade scalpel in the appropriate fashion along the cutaneous vermilion border and the mucosal lip. The remaining actinically damaged mucosal tissue was excised.  The mucosal advancement flap was then elevated to the gingival sulcus with care taken to preserve the neurovascular structures and advanced into the primary defect. Care was taken to ensure that precise realignment of the vermilion border was achieved.
Banner Transposition Flap Text: The defect edges were debeveled with a #15 scalpel blade.  Given the location of the defect and the proximity to free margins a Banner transposition flap was deemed most appropriate.  Using a sterile surgical marker, an appropriate flap drawn around the defect. The area thus outlined was incised deep to adipose tissue with a #15 scalpel blade.  The skin margins were undermined to an appropriate distance in all directions utilizing iris scissors.
Consent Type: Consent 1 (Standard)
Wound Care: Vaseline
Purse String (Intermediate) Text: Given the location of the defect and the characteristics of the surrounding skin a purse string intermediate closure was deemed most appropriate.  Undermining was performed circumfirentially around the surgical defect.  A purse string suture was then placed and tightened.
Burow's Advancement Flap Text: The defect edges were debeveled with a #15 scalpel blade.  Given the location of the defect and the proximity to free margins a Burow's advancement flap was deemed most appropriate.  Using a sterile surgical marker, the appropriate advancement flap was drawn incorporating the defect and placing the expected incisions within the relaxed skin tension lines where possible.    The area thus outlined was incised deep to adipose tissue with a #15 scalpel blade.  The skin margins were undermined to an appropriate distance in all directions utilizing iris scissors.
Full Thickness Lip Wedge Repair (Flap) Text: Given the location of the defect and the proximity to free margins a full thickness wedge repair was deemed most appropriate.  Using a sterile surgical marker, the appropriate repair was drawn incorporating the defect and placing the expected incisions perpendicular to the vermilion border.  The vermilion border was also meticulously outlined to ensure appropriate reapproximation during the repair.  The area thus outlined was incised through and through with a #15 scalpel blade.  The muscularis and dermis were reaproximated with deep sutures following hemostasis. Care was taken to realign the vermilion border before proceeding with the superficial closure.  Once the vermilion was realigned the superfical and mucosal closure was finished.
Consent 2/Introductory Paragraph: Mohs surgery was explained to the patient and consent was obtained. The risks, benefits and alternatives to therapy were discussed in detail. Specifically, the risks of infection, scarring, bleeding, prolonged wound healing, incomplete removal, allergy to anesthesia, nerve injury and recurrence were addressed. Prior to the procedure, the treatment site was clearly identified and confirmed by the patient. All components of Universal Protocol/PAUSE Rule completed.
O-L Flap Text: The defect edges were debeveled with a #15 scalpel blade.  Given the location of the defect, shape of the defect and the proximity to free margins an O-L flap was deemed most appropriate.  Using a sterile surgical marker, an appropriate advancement flap was drawn incorporating the defect and placing the expected incisions within the relaxed skin tension lines where possible.    The area thus outlined was incised deep to adipose tissue with a #15 scalpel blade.  The skin margins were undermined to an appropriate distance in all directions utilizing iris scissors.
Ear Wedge Repair Text: A wedge excision was completed by carrying down an excision through the full thickness of the ear and cartilage with an inward facing Burow's triangle. The wound was then closed in a layered fashion.
V-Y Plasty Text: The defect edges were debeveled with a #15 scalpel blade.  Given the location of the defect, shape of the defect and the proximity to free margins an V-Y advancement flap was deemed most appropriate.  Using a sterile surgical marker, an appropriate advancement flap was drawn incorporating the defect and placing the expected incisions within the relaxed skin tension lines where possible.    The area thus outlined was incised deep to adipose tissue with a #15 scalpel blade.  The skin margins were undermined to an appropriate distance in all directions utilizing iris scissors.
Consent (Temporal Branch)/Introductory Paragraph: The rationale for Mohs was explained to the patient and consent was obtained. The risks, benefits and alternatives to therapy were discussed in detail. Specifically, the risks of damage to the temporal branch of the facial nerve, infection, scarring, bleeding, prolonged wound healing, incomplete removal, allergy to anesthesia, and recurrence were addressed. Prior to the procedure, the treatment site was clearly identified and confirmed by the patient. All components of Universal Protocol/PAUSE Rule completed.
Simple / Intermediate / Complex Repair - Final Wound Length In Cm: 2.1
Wound Check: 6 weeks
Mastoid Interpolation Flap Text: A decision was made to reconstruct the defect utilizing an interpolation axial flap and a staged reconstruction.  A telfa template was made of the defect.  This telfa template was then used to outline the mastoid interpolation flap.  The donor area for the pedicle flap was then injected with anesthesia.  The flap was excised through the skin and subcutaneous tissue down to the layer of the underlying musculature.  The pedicle flap was carefully excised within this deep plane to maintain its blood supply.  The edges of the donor site were undermined.   The donor site was closed in a primary fashion.  The pedicle was then rotated into position and sutured.  Once the tube was sutured into place, adequate blood supply was confirmed with blanching and refill.  The pedicle was then wrapped with xeroform gauze and dressed appropriately with a telfa and gauze bandage to ensure continued blood supply and protect the attached pedicle.
Advancement Flap (Double) Text: The defect edges were debeveled with a #15 scalpel blade.  Given the location of the defect and the proximity to free margins a double advancement flap was deemed most appropriate.  Using a sterile surgical marker, the appropriate advancement flaps were drawn incorporating the defect and placing the expected incisions within the relaxed skin tension lines where possible.    The area thus outlined was incised deep to adipose tissue with a #15 scalpel blade.  The skin margins were undermined to an appropriate distance in all directions utilizing iris scissors.
Estimated Blood Loss (Cc): minimal
Unna Boot Text: An Unna boot was placed to help immobilize the limb and facilitate more rapid healing.
Bilobed Flap Text: The defect edges were debeveled with a #15 scalpel blade.  Given the location of the defect and the proximity to free margins a bilobe flap was deemed most appropriate.  Using a sterile surgical marker, an appropriate bilobe flap drawn around the defect.    The area thus outlined was incised deep to adipose tissue with a #15 scalpel blade.  The skin margins were undermined to an appropriate distance in all directions utilizing iris scissors.
A-T Advancement Flap Text: The defect edges were debeveled with a #15 scalpel blade.  Given the location of the defect, shape of the defect and the proximity to free margins an A-T advancement flap was deemed most appropriate.  Using a sterile surgical marker, an appropriate advancement flap was drawn incorporating the defect and placing the expected incisions within the relaxed skin tension lines where possible.    The area thus outlined was incised deep to adipose tissue with a #15 scalpel blade.  The skin margins were undermined to an appropriate distance in all directions utilizing iris scissors.
Interpolation Flap Text: A decision was made to reconstruct the defect utilizing an interpolation axial flap and a staged reconstruction.  A telfa template was made of the defect.  This telfa template was then used to outline the interpolation flap.  The donor area for the pedicle flap was then injected with anesthesia.  The flap was excised through the skin and subcutaneous tissue down to the layer of the underlying musculature.  The interpolation flap was carefully excised within this deep plane to maintain its blood supply.  The edges of the donor site were undermined.   The donor site was closed in a primary fashion.  The pedicle was then rotated into position and sutured.  Once the tube was sutured into place, adequate blood supply was confirmed with blanching and refill.  The pedicle was then wrapped with xeroform gauze and dressed appropriately with a telfa and gauze bandage to ensure continued blood supply and protect the attached pedicle.
Medical Necessity Statement: Based on my medical judgement, Mohs surgery is the most appropriate treatment for this cancer compared to other treatments.
Tissue Cultured Epidermal Autograft Text: The defect edges were debeveled with a #15 scalpel blade.  Given the location of the defect, shape of the defect and the proximity to free margins a tissue cultured epidermal autograft was deemed most appropriate.  The graft was then trimmed to fit the size of the defect.  The graft was then placed in the primary defect and oriented appropriately.
Alar Island Pedicle Flap Text: The defect edges were debeveled with a #15 scalpel blade.  Given the location of the defect, shape of the defect and the proximity to the alar rim an island pedicle advancement flap was deemed most appropriate.  Using a sterile surgical marker, an appropriate advancement flap was drawn incorporating the defect, outlining the appropriate donor tissue and placing the expected incisions within the nasal ala running parallel to the alar rim. The area thus outlined was incised with a #15 scalpel blade.  The skin margins were undermined minimally to an appropriate distance in all directions around the primary defect and laterally outward around the island pedicle utilizing iris scissors.  There was minimal undermining beneath the pedicle flap.
Star Wedge Flap Text: The defect edges were debeveled with a #15 scalpel blade.  Given the location of the defect, shape of the defect and the proximity to free margins a star wedge flap was deemed most appropriate.  Using a sterile surgical marker, an appropriate rotation flap was drawn incorporating the defect and placing the expected incisions within the relaxed skin tension lines where possible. The area thus outlined was incised deep to adipose tissue with a #15 scalpel blade.  The skin margins were undermined to an appropriate distance in all directions utilizing iris scissors.
W Plasty Text: The lesion was extirpated to the level of the fat with a #15 scalpel blade.  Given the location of the defect, shape of the defect and the proximity to free margins a W-plasty was deemed most appropriate for repair.  Using a sterile surgical marker, the appropriate transposition arms of the W-plasty were drawn incorporating the defect and placing the expected incisions within the relaxed skin tension lines where possible.    The area thus outlined was incised deep to adipose tissue with a #15 scalpel blade.  The skin margins were undermined to an appropriate distance in all directions utilizing iris scissors.  The opposing transposition arms were then transposed into place in opposite direction and anchored with interrupted buried subcutaneous sutures.
Mercedes Flap Text: The defect edges were debeveled with a #15 scalpel blade.  Given the location of the defect, shape of the defect and the proximity to free margins a Mercedes flap was deemed most appropriate.  Using a sterile surgical marker, an appropriate advancement flap was drawn incorporating the defect and placing the expected incisions within the relaxed skin tension lines where possible. The area thus outlined was incised deep to adipose tissue with a #15 scalpel blade.  The skin margins were undermined to an appropriate distance in all directions utilizing iris scissors.
Advancement-Rotation Flap Text: The defect edges were debeveled with a #15 scalpel blade.  Given the location of the defect, shape of the defect and the proximity to free margins an advancement-rotation flap was deemed most appropriate.  Using a sterile surgical marker, an appropriate flap was drawn incorporating the defect and placing the expected incisions within the relaxed skin tension lines where possible. The area thus outlined was incised deep to adipose tissue with a #15 scalpel blade.  The skin margins were undermined to an appropriate distance in all directions utilizing iris scissors.
Graft Donor Site Epidermal Sutures (Optional): 5-0 Surgipro
Repair Type: Complex Repair
O-Z Plasty Text: The defect edges were debeveled with a #15 scalpel blade.  Given the location of the defect, shape of the defect and the proximity to free margins an O-Z plasty (double transposition flap) was deemed most appropriate.  Using a sterile surgical marker, the appropriate transposition flaps were drawn incorporating the defect and placing the expected incisions within the relaxed skin tension lines where possible.    The area thus outlined was incised deep to adipose tissue with a #15 scalpel blade.  The skin margins were undermined to an appropriate distance in all directions utilizing iris scissors.  Hemostasis was achieved with electrocautery.  The flaps were then transposed into place, one clockwise and the other counterclockwise, and anchored with interrupted buried subcutaneous sutures.
Melolabial Interpolation Flap Text: A decision was made to reconstruct the defect utilizing an interpolation axial flap and a staged reconstruction.  A telfa template was made of the defect.  This telfa template was then used to outline the melolabial interpolation flap.  The donor area for the pedicle flap was then injected with anesthesia.  The flap was excised through the skin and subcutaneous tissue down to the layer of the underlying musculature.  The pedicle flap was carefully excised within this deep plane to maintain its blood supply.  The edges of the donor site were undermined.   The donor site was closed in a primary fashion.  The pedicle was then rotated into position and sutured.  Once the tube was sutured into place, adequate blood supply was confirmed with blanching and refill.  The pedicle was then wrapped with xeroform gauze and dressed appropriately with a telfa and gauze bandage to ensure continued blood supply and protect the attached pedicle.
Island Pedicle Flap-Requiring Vessel Identification Text: The defect edges were debeveled with a #15 scalpel blade.  Given the location of the defect, shape of the defect and the proximity to free margins an island pedicle advancement flap was deemed most appropriate.  Using a sterile surgical marker, an appropriate advancement flap was drawn, based on the axial vessel mentioned above, incorporating the defect, outlining the appropriate donor tissue and placing the expected incisions within the relaxed skin tension lines where possible.    The area thus outlined was incised deep to adipose tissue with a #15 scalpel blade.  The skin margins were undermined to an appropriate distance in all directions around the primary defect and laterally outward around the island pedicle utilizing iris scissors.  There was minimal undermining beneath the pedicle flap.
Consent (Spinal Accessory)/Introductory Paragraph: The rationale for Mohs was explained to the patient and consent was obtained. The risks, benefits and alternatives to therapy were discussed in detail. Specifically, the risks of damage to the spinal accessory nerve, infection, scarring, bleeding, prolonged wound healing, incomplete removal, allergy to anesthesia, and recurrence were addressed. Prior to the procedure, the treatment site was clearly identified and confirmed by the patient. All components of Universal Protocol/PAUSE Rule completed.
Island Pedicle Flap With Canthal Suspension Text: The defect edges were debeveled with a #15 scalpel blade.  Given the location of the defect, shape of the defect and the proximity to free margins an island pedicle advancement flap was deemed most appropriate.  Using a sterile surgical marker, an appropriate advancement flap was drawn incorporating the defect, outlining the appropriate donor tissue and placing the expected incisions within the relaxed skin tension lines where possible. The area thus outlined was incised deep to adipose tissue with a #15 scalpel blade.  The skin margins were undermined to an appropriate distance in all directions around the primary defect and laterally outward around the island pedicle utilizing iris scissors.  There was minimal undermining beneath the pedicle flap. A suspension suture was placed in the canthal tendon to prevent tension and prevent ectropion.
Ftsg Text: The defect edges were debeveled with a #15 scalpel blade.  Given the location of the defect, shape of the defect and the proximity to free margins a full thickness skin graft was deemed most appropriate.  Using a sterile surgical marker, the primary defect shape was transferred to the donor site. The area thus outlined was incised deep to adipose tissue with a #15 scalpel blade.  The harvested graft was then trimmed of adipose tissue until only dermis and epidermis was left.  The skin margins of the secondary defect were undermined to an appropriate distance in all directions utilizing iris scissors.  The secondary defect was closed with interrupted buried subcutaneous sutures.  The skin edges were then re-apposed with running  sutures.  The skin graft was then placed in the primary defect and oriented appropriately.
Donor Site Anesthesia Type: same as repair anesthesia
Cheek-To-Nose Interpolation Flap Text: A decision was made to reconstruct the defect utilizing an interpolation axial flap and a staged reconstruction.  A telfa template was made of the defect.  This telfa template was then used to outline the Cheek-To-Nose Interpolation flap.  The donor area for the pedicle flap was then injected with anesthesia.  The flap was excised through the skin and subcutaneous tissue down to the layer of the underlying musculature.  The interpolation flap was carefully excised within this deep plane to maintain its blood supply.  The edges of the donor site were undermined.   The donor site was closed in a primary fashion.  The pedicle was then rotated into position and sutured.  Once the tube was sutured into place, adequate blood supply was confirmed with blanching and refill.  The pedicle was then wrapped with xeroform gauze and dressed appropriately with a telfa and gauze bandage to ensure continued blood supply and protect the attached pedicle.
Dermal Autograft Text: The defect edges were debeveled with a #15 scalpel blade.  Given the location of the defect, shape of the defect and the proximity to free margins a dermal autograft was deemed most appropriate.  Using a sterile surgical marker, the primary defect shape was transferred to the donor site. The area thus outlined was incised deep to adipose tissue with a #15 scalpel blade.  The harvested graft was then trimmed of adipose and epidermal tissue until only dermis was left.  The skin graft was then placed in the primary defect and oriented appropriately.
Advancement Flap (Single) Text: The defect edges were debeveled with a #15 scalpel blade.  Given the location of the defect and the proximity to free margins a single advancement flap was deemed most appropriate.  Using a sterile surgical marker, an appropriate advancement flap was drawn incorporating the defect and placing the expected incisions within the relaxed skin tension lines where possible.    The area thus outlined was incised deep to adipose tissue with a #15 scalpel blade.  The skin margins were undermined to an appropriate distance in all directions utilizing iris scissors.
H Plasty Text: Given the location of the defect, shape of the defect and the proximity to free margins a H-plasty was deemed most appropriate for repair.  Using a sterile surgical marker, the appropriate advancement arms of the H-plasty were drawn incorporating the defect and placing the expected incisions within the relaxed skin tension lines where possible. The area thus outlined was incised deep to adipose tissue with a #15 scalpel blade. The skin margins were undermined to an appropriate distance in all directions utilizing iris scissors.  The opposing advancement arms were then advanced into place in opposite direction and anchored with interrupted buried subcutaneous sutures.
Complex Repair And Graft Additional Text (Will Appearing After The Standard Complex Repair Text): The complex repair was not sufficient to completely close the primary defect. The remaining additional defect was repaired with the graft mentioned below.
Repair Hemostasis (Optional): Pinpoint electrocautery
Consent 1/Introductory Paragraph: The rationale for Mohs was explained to the patient and consent was obtained. The risks, benefits and alternatives to therapy were discussed in detail. Specifically, the risks of infection, scarring, bleeding, prolonged wound healing, incomplete removal, allergy to anesthesia, nerve injury and recurrence were addressed. Prior to the procedure, the treatment site was clearly identified and confirmed by the patient. All components of Universal Protocol/PAUSE Rule completed.
Anesthesia Volume In Cc: 9
Mohs Method Verbiage: An incision at a 45 degree angle following the standard Mohs approach was done and the specimen was harvested as a microscopic controlled layer.
Mauc Instructions: By selecting yes to the question below the MAUC number will be added into the note.  This will be calculated automatically based on the diagnosis chosen, the size entered, the body zone selected (H,M,L) and the specific indications you chose. You will also have the option to override the Mohs AUC if you disagree with the automatically calculated number and this option is found in the Case Summary tab.
Consent (Scalp)/Introductory Paragraph: The rationale for Mohs was explained to the patient and consent was obtained. The risks, benefits and alternatives to therapy were discussed in detail. Specifically, the risks of changes in hair growth pattern secondary to repair, infection, scarring, bleeding, prolonged wound healing, incomplete removal, allergy to anesthesia, nerve injury and recurrence were addressed. Prior to the procedure, the treatment site was clearly identified and confirmed by the patient. All components of Universal Protocol/PAUSE Rule completed.
Detail Level: Detailed
Paramedian Forehead Flap Text: A decision was made to reconstruct the defect utilizing an interpolation axial flap and a staged reconstruction.  A telfa template was made of the defect.  This telfa template was then used to outline the paramedian forehead pedicle flap.  The donor area for the pedicle flap was then injected with anesthesia.  The flap was excised through the skin and subcutaneous tissue down to the layer of the underlying musculature.  The pedicle flap was carefully excised within this deep plane to maintain its blood supply.  The edges of the donor site were undermined.   The donor site was closed in a primary fashion.  The pedicle was then rotated into position and sutured.  Once the tube was sutured into place, adequate blood supply was confirmed with blanching and refill.  The pedicle was then wrapped with xeroform gauze and dressed appropriately with a telfa and gauze bandage to ensure continued blood supply and protect the attached pedicle.
Complex Repair And Flap Additional Text (Will Appearing After The Standard Complex Repair Text): The complex repair was not sufficient to completely close the primary defect. The remaining additional defect was repaired with the flap mentioned below.
Undermining Location (Optional): in the superficial subcutaneous fat
Alternatives Discussed Intro (Do Not Add Period): I discussed alternative treatments to Mohs surgery and specifically discussed the risks and benefits of
Tarsorrhaphy Text: A tarsorrhaphy was performed using Frost sutures.
Posterior Auricular Interpolation Flap Text: A decision was made to reconstruct the defect utilizing an interpolation axial flap and a staged reconstruction.  A telfa template was made of the defect.  This telfa template was then used to outline the posterior auricular interpolation flap.  The donor area for the pedicle flap was then injected with anesthesia.  The flap was excised through the skin and subcutaneous tissue down to the layer of the underlying musculature.  The pedicle flap was carefully excised within this deep plane to maintain its blood supply.  The edges of the donor site were undermined.   The donor site was closed in a primary fashion.  The pedicle was then rotated into position and sutured.  Once the tube was sutured into place, adequate blood supply was confirmed with blanching and refill.  The pedicle was then wrapped with xeroform gauze and dressed appropriately with a telfa and gauze bandage to ensure continued blood supply and protect the attached pedicle.
Melolabial Transposition Flap Text: The defect edges were debeveled with a #15 scalpel blade.  Given the location of the defect and the proximity to free margins a melolabial flap was deemed most appropriate.  Using a sterile surgical marker, an appropriate melolabial transposition flap was drawn incorporating the defect.    The area thus outlined was incised deep to adipose tissue with a #15 scalpel blade.  The skin margins were undermined to an appropriate distance in all directions utilizing iris scissors.
Graft Donor Site Bandage (Optional-Leave Blank If You Don't Want In Note): Aquaplast was fitted to the graft site and sewn into place. A pressure bandage were applied to the donor site and over the aquaplast bolster.
Eye Protection Verbiage: Before proceeding with the stage, a plastic scleral shield was inserted. The globe was anesthetized with a few drops of 1% lidocaine with 1:100,000 epinephrine. Then, an appropriate sized scleral shield was chosen and coated with lacrilube ointment. The shield was gently inserted and left in place for the duration of each stage. After the stage was completed, the shield was gently removed.
Helical Rim Advancement Flap Text: The defect edges were debeveled with a #15 blade scalpel.  Given the location of the defect and the proximity to free margins (helical rim) a double helical rim advancement flap was deemed most appropriate.  Using a sterile surgical marker, the appropriate advancement flaps were drawn incorporating the defect and placing the expected incisions between the helical rim and antihelix where possible.  The area thus outlined was incised through and through with a #15 scalpel blade.  With a skin hook and iris scissors, the flaps were gently and sharply undermined and freed up.
Rhomboid Transposition Flap Text: The defect edges were debeveled with a #15 scalpel blade.  Given the location of the defect and the proximity to free margins a rhomboid transposition flap was deemed most appropriate.  Using a sterile surgical marker, an appropriate rhomboid flap was drawn incorporating the defect.    The area thus outlined was incised deep to adipose tissue with a #15 scalpel blade.  The skin margins were undermined to an appropriate distance in all directions utilizing iris scissors.
Double O-Z Plasty Text: The defect edges were debeveled with a #15 scalpel blade.  Given the location of the defect, shape of the defect and the proximity to free margins a Double O-Z plasty (double transposition flap) was deemed most appropriate.  Using a sterile surgical marker, the appropriate transposition flaps were drawn incorporating the defect and placing the expected incisions within the relaxed skin tension lines where possible. The area thus outlined was incised deep to adipose tissue with a #15 scalpel blade.  The skin margins were undermined to an appropriate distance in all directions utilizing iris scissors.  Hemostasis was achieved with electrocautery.  The flaps were then transposed into place, one clockwise and the other counterclockwise, and anchored with interrupted buried subcutaneous sutures.

## 2019-03-27 ENCOUNTER — APPOINTMENT (RX ONLY)
Dept: URBAN - METROPOLITAN AREA CLINIC 36 | Facility: CLINIC | Age: 64
Setting detail: DERMATOLOGY
End: 2019-03-27

## 2019-03-27 DIAGNOSIS — Z48.02 ENCOUNTER FOR REMOVAL OF SUTURES: ICD-10-CM

## 2019-03-27 PROCEDURE — ? SUTURE REMOVAL (GLOBAL PERIOD)

## 2019-03-27 PROCEDURE — 99024 POSTOP FOLLOW-UP VISIT: CPT

## 2019-03-27 ASSESSMENT — LOCATION SIMPLE DESCRIPTION DERM: LOCATION SIMPLE: RIGHT FOREHEAD

## 2019-03-27 ASSESSMENT — LOCATION DETAILED DESCRIPTION DERM: LOCATION DETAILED: RIGHT SUPERIOR FOREHEAD

## 2019-03-27 ASSESSMENT — LOCATION ZONE DERM: LOCATION ZONE: FACE

## 2019-03-27 NOTE — PROCEDURE: SUTURE REMOVAL (GLOBAL PERIOD)
Add 27957 Cpt? (Important Note: In 2017 The Use Of 05203 Is Being Tracked By Cms To Determine Future Global Period Reimbursement For Global Periods): yes
Detail Level: Detailed

## 2019-04-22 ENCOUNTER — APPOINTMENT (RX ONLY)
Dept: URBAN - METROPOLITAN AREA CLINIC 36 | Facility: CLINIC | Age: 64
Setting detail: DERMATOLOGY
End: 2019-04-22

## 2019-04-22 ENCOUNTER — OFFICE VISIT (OUTPATIENT)
Dept: INTERNAL MEDICINE | Facility: IMAGING CENTER | Age: 64
End: 2019-04-22
Payer: MEDICARE

## 2019-04-22 VITALS
HEART RATE: 68 BPM | DIASTOLIC BLOOD PRESSURE: 72 MMHG | HEIGHT: 73 IN | WEIGHT: 193 LBS | RESPIRATION RATE: 16 BRPM | BODY MASS INDEX: 25.58 KG/M2 | TEMPERATURE: 98.7 F | OXYGEN SATURATION: 98 % | SYSTOLIC BLOOD PRESSURE: 135 MMHG

## 2019-04-22 DIAGNOSIS — F51.01 PRIMARY INSOMNIA: Chronic | ICD-10-CM

## 2019-04-22 DIAGNOSIS — E78.2 MIXED DYSLIPIDEMIA: ICD-10-CM

## 2019-04-22 DIAGNOSIS — Z23 NEED FOR VACCINATION WITH 13-POLYVALENT PNEUMOCOCCAL CONJUGATE VACCINE: ICD-10-CM

## 2019-04-22 DIAGNOSIS — R89.9 ABNORMAL LABORATORY TEST: ICD-10-CM

## 2019-04-22 DIAGNOSIS — Z48.817 ENCOUNTER FOR SURGICAL AFTERCARE FOLLOWING SURGERY ON THE SKIN AND SUBCUTANEOUS TISSUE: ICD-10-CM

## 2019-04-22 PROCEDURE — ? PULSED-DYE LASER

## 2019-04-22 PROCEDURE — 99214 OFFICE O/P EST MOD 30 MIN: CPT | Performed by: FAMILY MEDICINE

## 2019-04-22 RX ORDER — ALPRAZOLAM 0.25 MG/1
0.25 TABLET ORAL NIGHTLY PRN
Qty: 30 TAB | Refills: 2 | Status: SHIPPED
Start: 2019-04-22 | End: 2019-05-22

## 2019-04-22 NOTE — PROCEDURE: PULSED-DYE LASER
Delay Time (Ms): 20
Pulse Duration: 10 ms
Spot Size: 7 mm
Location (Required For Details To Render In Note But Body Touch Will Also Count For First Location): forehead
Fluence In J/Cm2 (Optional): 4
Post-Procedure Care: Vaseline and ice applied. Post care reviewed with patient.
Cryogen Time (Ms): 30
Immediate Endpoint: erythema
Detail Level: Zone
Treated Area: small area
Spot Size: 10 mm
Consent: Written consent obtained, risks reviewed including but not limited to crusting, scabbing, blistering, scarring, darker or lighter pigmentary change, incidental hair removal, bruising, and/or incomplete removal.
Pulse Duration: .45 ms
Post-Care Instructions: I reviewed with the patient in detail post-care instructions. Patient should stay away from the sun and wear sun protection until treated areas are fully healed.
Laser Type: Vbeam Perfecta 595nm

## 2019-04-22 NOTE — PROGRESS NOTES
"Chief Complaint   Patient presents with   • Insomnia       HPI:  Patient is a 63 y.o. male established patient who presents today for medication to control chronic primary insomnia (mainly travel related). He uses low dose xanax use hs PRN without medication related side effects and continues to use it in a responsible manner. He is also due for Prevnar and has pending repeat fasting lipid panel and CMP that were due in December - January 2019. He is seeing Dr. Burger later today for evaluation of his right forehead Moh's surgery site - he reports the wound become inflamed last week while traveling. He denies other new health changes at this time and forgot to fast for his lab draw today.     Patient Active Problem List    Diagnosis Date Noted   • Mixed dyslipidemia 10/01/2018   • Other male erectile dysfunction 10/01/2018   • History of squamous cell carcinoma of skin 03/24/2018   • Primary insomnia 03/23/2018   • Low testosterone level in male 06/13/2017   • Essential hypertension 06/13/2017   • Tubular adenoma of colon 01/01/2014       Past medical, surgical, family, and social history was reviewed and updated in Epic chart by me today.     Medications and allergies reviewed and updated in Epic chart by me today.     ROS:  Pertinent positives listed above in HPI. All other systems have been reviewed and are negative.    PE:   /72 (BP Location: Left arm, Patient Position: Sitting, BP Cuff Size: Adult)   Pulse 68   Temp 37.1 °C (98.7 °F) (Temporal)   Resp 16   Ht 1.854 m (6' 1\")   Wt 87.5 kg (193 lb)   SpO2 98%   BMI 25.46 kg/m²   Vital signs reviewed with patient.     Gen: Well developed; well nourished; no acute distress; non toxic appearance   CV: Regular rate and rhythm; S1/ S2 present; no murmur, gallop or rub noted  Pulm: No respiratory distress; clear to ascultation b/l; no wheezing or stridor noted b/l  Abd: Adequate bowel sounds noted; soft and nontender; no rebound, rigidity, nor " distention  Extremities: No peripheral edema b/l LE extremities/ no clubbing nor cyanosis noted  Skin: Warm and dry; no rashes noted; right forehead Mohs surgery site inflamed (patient has appointment with Dr. Burger today)  Neuro: No focal deficits noted   Psych: AAOx4; mood and affect are appropriate    A/P:  1. Primary insomnia  Stable/ condition controlled with infrequent Xanax use.  reviewed today and no concerns noted. Pt is well versed in safe use of controlled medication, and benefit of use outweighs risk at this time. RX faxed to pharmacy today.   - ALPRAZolam (XANAX) 0.25 MG Tab; Take 1 Tab by mouth at bedtime as needed for Sleep for up to 30 days.  Dispense: 30 Tab; Refill: 2    2. Mixed dyslipidemia  Patient is due for repeat fasting lipid panel and will return tomorrow morning for lab draw.     3. Need for vaccination with 13-polyvalent pneumococcal conjugate vaccine  Pt will obtain Prevnar at lab draw appointment tomorrow.     4. Abnormal laboratory test  CMP done 9/5/18 was not at his baseline and will return tomorrow for lab draw.     Patient is stable at this time and will return tomorrow for fasting lab draw and Prevnar vaccination with Jimena HILTON.

## 2019-04-23 ENCOUNTER — NON-PROVIDER VISIT (OUTPATIENT)
Dept: INTERNAL MEDICINE | Facility: IMAGING CENTER | Age: 64
End: 2019-04-23
Payer: MEDICARE

## 2019-04-23 ENCOUNTER — HOSPITAL ENCOUNTER (OUTPATIENT)
Facility: MEDICAL CENTER | Age: 64
End: 2019-04-23
Attending: FAMILY MEDICINE
Payer: MEDICARE

## 2019-04-23 DIAGNOSIS — E78.2 MIXED DYSLIPIDEMIA: ICD-10-CM

## 2019-04-23 DIAGNOSIS — Z23 NEED FOR VACCINATION: ICD-10-CM

## 2019-04-23 DIAGNOSIS — R89.9 ABNORMAL LABORATORY TEST: ICD-10-CM

## 2019-04-23 LAB
ALBUMIN SERPL BCP-MCNC: 4.6 G/DL (ref 3.2–4.9)
ALBUMIN/GLOB SERPL: 2.6 G/DL
ALP SERPL-CCNC: 49 U/L (ref 30–99)
ALT SERPL-CCNC: 29 U/L (ref 2–50)
ANION GAP SERPL CALC-SCNC: 9 MMOL/L (ref 0–11.9)
AST SERPL-CCNC: 20 U/L (ref 12–45)
BILIRUB SERPL-MCNC: 0.7 MG/DL (ref 0.1–1.5)
BUN SERPL-MCNC: 24 MG/DL (ref 8–22)
CALCIUM SERPL-MCNC: 9.5 MG/DL (ref 8.5–10.5)
CHLORIDE SERPL-SCNC: 108 MMOL/L (ref 96–112)
CHOLEST SERPL-MCNC: 184 MG/DL (ref 100–199)
CO2 SERPL-SCNC: 26 MMOL/L (ref 20–33)
CREAT SERPL-MCNC: 0.96 MG/DL (ref 0.5–1.4)
GLOBULIN SER CALC-MCNC: 1.8 G/DL (ref 1.9–3.5)
GLUCOSE SERPL-MCNC: 99 MG/DL (ref 65–99)
HDLC SERPL-MCNC: 80 MG/DL
LDLC SERPL CALC-MCNC: 96 MG/DL
POTASSIUM SERPL-SCNC: 4.3 MMOL/L (ref 3.6–5.5)
PROT SERPL-MCNC: 6.4 G/DL (ref 6–8.2)
SODIUM SERPL-SCNC: 143 MMOL/L (ref 135–145)
TRIGL SERPL-MCNC: 42 MG/DL (ref 0–149)

## 2019-04-23 PROCEDURE — 80053 COMPREHEN METABOLIC PANEL: CPT

## 2019-04-23 PROCEDURE — G0009 ADMIN PNEUMOCOCCAL VACCINE: HCPCS | Performed by: FAMILY MEDICINE

## 2019-04-23 PROCEDURE — 90670 PCV13 VACCINE IM: CPT | Performed by: FAMILY MEDICINE

## 2019-04-23 PROCEDURE — 80061 LIPID PANEL: CPT

## 2019-06-05 ENCOUNTER — APPOINTMENT (RX ONLY)
Dept: URBAN - METROPOLITAN AREA CLINIC 35 | Facility: CLINIC | Age: 64
Setting detail: DERMATOLOGY
End: 2019-06-05

## 2019-06-05 DIAGNOSIS — L81.4 OTHER MELANIN HYPERPIGMENTATION: ICD-10-CM

## 2019-06-05 DIAGNOSIS — L72.8 OTHER FOLLICULAR CYSTS OF THE SKIN AND SUBCUTANEOUS TISSUE: ICD-10-CM

## 2019-06-05 DIAGNOSIS — Z71.89 OTHER SPECIFIED COUNSELING: ICD-10-CM

## 2019-06-05 DIAGNOSIS — D22 MELANOCYTIC NEVI: ICD-10-CM

## 2019-06-05 DIAGNOSIS — L57.0 ACTINIC KERATOSIS: ICD-10-CM

## 2019-06-05 DIAGNOSIS — Z85.828 PERSONAL HISTORY OF OTHER MALIGNANT NEOPLASM OF SKIN: ICD-10-CM

## 2019-06-05 DIAGNOSIS — L259 CONTACT DERMATITIS AND OTHER ECZEMA, UNSPECIFIED CAUSE: ICD-10-CM

## 2019-06-05 PROBLEM — D22.5 MELANOCYTIC NEVI OF TRUNK: Status: ACTIVE | Noted: 2019-06-05

## 2019-06-05 PROBLEM — L30.8 OTHER SPECIFIED DERMATITIS: Status: ACTIVE | Noted: 2019-06-05

## 2019-06-05 PROCEDURE — 17000 DESTRUCT PREMALG LESION: CPT

## 2019-06-05 PROCEDURE — ? PRESCRIPTION

## 2019-06-05 PROCEDURE — ? COUNSELING

## 2019-06-05 PROCEDURE — ? LIQUID NITROGEN

## 2019-06-05 PROCEDURE — ? OBSERVATION AND MEASURE

## 2019-06-05 PROCEDURE — 99214 OFFICE O/P EST MOD 30 MIN: CPT | Mod: 25

## 2019-06-05 RX ORDER — DESONIDE 0.5 MG/G
1 APPLICATION CREAM TOPICAL
Qty: 3 | Refills: 2 | Status: ERX | COMMUNITY
Start: 2019-06-05

## 2019-06-05 RX ADMIN — DESONIDE 1 APPLICATION: 0.5 CREAM TOPICAL at 00:00

## 2019-06-05 ASSESSMENT — LOCATION SIMPLE DESCRIPTION DERM
LOCATION SIMPLE: RIGHT ANTERIOR NECK
LOCATION SIMPLE: LOWER BACK
LOCATION SIMPLE: LEFT LOWER BACK
LOCATION SIMPLE: RIGHT FOREARM
LOCATION SIMPLE: RIGHT FOREHEAD
LOCATION SIMPLE: ABDOMEN
LOCATION SIMPLE: RIGHT CALF
LOCATION SIMPLE: UPPER BACK
LOCATION SIMPLE: LEFT UPPER BACK

## 2019-06-05 ASSESSMENT — LOCATION DETAILED DESCRIPTION DERM
LOCATION DETAILED: SUPERIOR LUMBAR SPINE
LOCATION DETAILED: LEFT MEDIAL UPPER BACK
LOCATION DETAILED: RIGHT FOREHEAD
LOCATION DETAILED: RIGHT DISTAL CALF
LOCATION DETAILED: EPIGASTRIC SKIN
LOCATION DETAILED: LEFT INFERIOR MEDIAL MIDBACK
LOCATION DETAILED: RIGHT INFERIOR LATERAL NECK
LOCATION DETAILED: SUPERIOR THORACIC SPINE
LOCATION DETAILED: RIGHT DISTAL RADIAL DORSAL FOREARM

## 2019-06-05 ASSESSMENT — LOCATION ZONE DERM
LOCATION ZONE: ARM
LOCATION ZONE: TRUNK
LOCATION ZONE: NECK
LOCATION ZONE: FACE
LOCATION ZONE: LEG

## 2019-06-05 NOTE — PROCEDURE: LIQUID NITROGEN
Number Of Freeze-Thaw Cycles: 2 freeze-thaw cycles
Consent: The patient's consent was obtained including but not limited to risks of crusting, scabbing, blistering, scarring, darker or lighter pigmentary change, recurrence, incomplete removal and infection.
Render Post-Care Instructions In Note?: no
Detail Level: Detailed
Duration Of Freeze Thaw-Cycle (Seconds): 2
Post-Care Instructions: I reviewed with the patient in detail post-care instructions. Patient is to wear sunprotection, and avoid picking at any of the treated lesions. Pt may apply Vaseline to crusted or scabbing areas.

## 2019-06-18 DIAGNOSIS — R79.89 LOW TESTOSTERONE LEVEL IN MALE: Chronic | ICD-10-CM

## 2019-06-18 RX ORDER — TESTOSTERONE 20.25 MG/1.25G
1 GEL TOPICAL DAILY
Qty: 90 PACKET | Refills: 1 | Status: SHIPPED
Start: 2019-06-18 | End: 2019-12-15

## 2019-09-26 DIAGNOSIS — R79.89 LOW TESTOSTERONE LEVEL IN MALE: Chronic | ICD-10-CM

## 2019-09-26 DIAGNOSIS — R53.83 OTHER FATIGUE: ICD-10-CM

## 2019-09-26 DIAGNOSIS — E78.2 MIXED DYSLIPIDEMIA: ICD-10-CM

## 2019-09-26 DIAGNOSIS — I10 ESSENTIAL HYPERTENSION: Chronic | ICD-10-CM

## 2019-09-26 DIAGNOSIS — Z12.5 SCREENING FOR PROSTATE CANCER: ICD-10-CM

## 2019-09-26 DIAGNOSIS — E55.9 VITAMIN D DEFICIENCY: ICD-10-CM

## 2019-09-27 ENCOUNTER — NON-PROVIDER VISIT (OUTPATIENT)
Dept: INTERNAL MEDICINE | Facility: IMAGING CENTER | Age: 64
End: 2019-09-27
Payer: MEDICARE

## 2019-09-27 ENCOUNTER — HOSPITAL ENCOUNTER (OUTPATIENT)
Facility: MEDICAL CENTER | Age: 64
End: 2019-09-27
Attending: FAMILY MEDICINE
Payer: MEDICARE

## 2019-09-27 DIAGNOSIS — R53.83 OTHER FATIGUE: ICD-10-CM

## 2019-09-27 DIAGNOSIS — Z12.5 SCREENING FOR PROSTATE CANCER: ICD-10-CM

## 2019-09-27 DIAGNOSIS — E55.9 VITAMIN D DEFICIENCY: ICD-10-CM

## 2019-09-27 DIAGNOSIS — I10 ESSENTIAL HYPERTENSION: Chronic | ICD-10-CM

## 2019-09-27 DIAGNOSIS — Z23 NEED FOR VACCINATION: ICD-10-CM

## 2019-09-27 DIAGNOSIS — E78.2 MIXED DYSLIPIDEMIA: ICD-10-CM

## 2019-09-27 DIAGNOSIS — R79.89 LOW TESTOSTERONE LEVEL IN MALE: Chronic | ICD-10-CM

## 2019-09-27 LAB
25(OH)D3 SERPL-MCNC: 90 NG/ML (ref 30–100)
ALBUMIN SERPL BCP-MCNC: 4.8 G/DL (ref 3.2–4.9)
ALBUMIN/GLOB SERPL: 2.4 G/DL
ALP SERPL-CCNC: 51 U/L (ref 30–99)
ALT SERPL-CCNC: 29 U/L (ref 2–50)
ANION GAP SERPL CALC-SCNC: 10 MMOL/L (ref 0–11.9)
AST SERPL-CCNC: 21 U/L (ref 12–45)
BASOPHILS # BLD AUTO: 0.8 % (ref 0–1.8)
BASOPHILS # BLD: 0.05 K/UL (ref 0–0.12)
BILIRUB SERPL-MCNC: 1.4 MG/DL (ref 0.1–1.5)
BUN SERPL-MCNC: 24 MG/DL (ref 8–22)
CALCIUM SERPL-MCNC: 9.9 MG/DL (ref 8.5–10.5)
CHLORIDE SERPL-SCNC: 104 MMOL/L (ref 96–112)
CHOLEST SERPL-MCNC: 204 MG/DL (ref 100–199)
CO2 SERPL-SCNC: 25 MMOL/L (ref 20–33)
CREAT SERPL-MCNC: 1.07 MG/DL (ref 0.5–1.4)
EOSINOPHIL # BLD AUTO: 0.35 K/UL (ref 0–0.51)
EOSINOPHIL NFR BLD: 5.3 % (ref 0–6.9)
ERYTHROCYTE [DISTWIDTH] IN BLOOD BY AUTOMATED COUNT: 44.1 FL (ref 35.9–50)
GLOBULIN SER CALC-MCNC: 2 G/DL (ref 1.9–3.5)
GLUCOSE SERPL-MCNC: 83 MG/DL (ref 65–99)
HCT VFR BLD AUTO: 51.2 % (ref 42–52)
HDLC SERPL-MCNC: 72 MG/DL
HGB BLD-MCNC: 17 G/DL (ref 14–18)
IMM GRANULOCYTES # BLD AUTO: 0.01 K/UL (ref 0–0.11)
IMM GRANULOCYTES NFR BLD AUTO: 0.2 % (ref 0–0.9)
LDLC SERPL CALC-MCNC: 119 MG/DL
LYMPHOCYTES # BLD AUTO: 1.42 K/UL (ref 1–4.8)
LYMPHOCYTES NFR BLD: 21.6 % (ref 22–41)
MCH RBC QN AUTO: 31.4 PG (ref 27–33)
MCHC RBC AUTO-ENTMCNC: 33.2 G/DL (ref 33.7–35.3)
MCV RBC AUTO: 94.5 FL (ref 81.4–97.8)
MONOCYTES # BLD AUTO: 0.52 K/UL (ref 0–0.85)
MONOCYTES NFR BLD AUTO: 7.9 % (ref 0–13.4)
NEUTROPHILS # BLD AUTO: 4.21 K/UL (ref 1.82–7.42)
NEUTROPHILS NFR BLD: 64.2 % (ref 44–72)
NRBC # BLD AUTO: 0 K/UL
NRBC BLD-RTO: 0 /100 WBC
PLATELET # BLD AUTO: 225 K/UL (ref 164–446)
PMV BLD AUTO: 10.1 FL (ref 9–12.9)
POTASSIUM SERPL-SCNC: 4 MMOL/L (ref 3.6–5.5)
PROT SERPL-MCNC: 6.8 G/DL (ref 6–8.2)
PSA SERPL-MCNC: 1.36 NG/ML (ref 0–4)
RBC # BLD AUTO: 5.42 M/UL (ref 4.7–6.1)
SODIUM SERPL-SCNC: 139 MMOL/L (ref 135–145)
TRIGL SERPL-MCNC: 66 MG/DL (ref 0–149)
TSH SERPL DL<=0.005 MIU/L-ACNC: 1.66 UIU/ML (ref 0.38–5.33)
WBC # BLD AUTO: 6.6 K/UL (ref 4.8–10.8)

## 2019-09-27 PROCEDURE — 84403 ASSAY OF TOTAL TESTOSTERONE: CPT

## 2019-09-27 PROCEDURE — 90662 IIV NO PRSV INCREASED AG IM: CPT | Performed by: FAMILY MEDICINE

## 2019-09-27 PROCEDURE — 84153 ASSAY OF PSA TOTAL: CPT

## 2019-09-27 PROCEDURE — 80061 LIPID PANEL: CPT

## 2019-09-27 PROCEDURE — 82306 VITAMIN D 25 HYDROXY: CPT

## 2019-09-27 PROCEDURE — 84270 ASSAY OF SEX HORMONE GLOBUL: CPT

## 2019-09-27 PROCEDURE — 85025 COMPLETE CBC W/AUTO DIFF WBC: CPT

## 2019-09-27 PROCEDURE — G0008 ADMIN INFLUENZA VIRUS VAC: HCPCS | Performed by: FAMILY MEDICINE

## 2019-09-27 PROCEDURE — 84443 ASSAY THYROID STIM HORMONE: CPT

## 2019-09-27 PROCEDURE — 80053 COMPREHEN METABOLIC PANEL: CPT

## 2019-09-28 LAB
SHBG SERPL-SCNC: 34 NMOL/L (ref 11–80)
TESTOST FREE MFR SERPL: 1.8 % (ref 1.6–2.9)
TESTOST FREE SERPL-MCNC: 67 PG/ML (ref 47–244)
TESTOST SERPL-MCNC: 372 NG/DL (ref 300–720)

## 2019-10-04 ENCOUNTER — OFFICE VISIT (OUTPATIENT)
Dept: INTERNAL MEDICINE | Facility: IMAGING CENTER | Age: 64
End: 2019-10-04
Payer: MEDICARE

## 2019-10-04 VITALS
SYSTOLIC BLOOD PRESSURE: 132 MMHG | DIASTOLIC BLOOD PRESSURE: 75 MMHG | OXYGEN SATURATION: 95 % | WEIGHT: 198 LBS | RESPIRATION RATE: 17 BRPM | TEMPERATURE: 98.2 F | BODY MASS INDEX: 26.24 KG/M2 | HEART RATE: 59 BPM | HEIGHT: 73 IN

## 2019-10-04 DIAGNOSIS — N52.8 OTHER MALE ERECTILE DYSFUNCTION: ICD-10-CM

## 2019-10-04 DIAGNOSIS — Z85.828 HISTORY OF SQUAMOUS CELL CARCINOMA OF SKIN: ICD-10-CM

## 2019-10-04 DIAGNOSIS — F51.01 PRIMARY INSOMNIA: Chronic | ICD-10-CM

## 2019-10-04 DIAGNOSIS — R79.89 LOW TESTOSTERONE LEVEL IN MALE: Chronic | ICD-10-CM

## 2019-10-04 DIAGNOSIS — Z12.12 SCREENING FOR COLORECTAL CANCER: ICD-10-CM

## 2019-10-04 DIAGNOSIS — Z12.11 SCREENING FOR COLORECTAL CANCER: ICD-10-CM

## 2019-10-04 DIAGNOSIS — E78.2 MIXED DYSLIPIDEMIA: ICD-10-CM

## 2019-10-04 DIAGNOSIS — I10 ESSENTIAL HYPERTENSION: Chronic | ICD-10-CM

## 2019-10-04 DIAGNOSIS — Z00.00 WELCOME TO MEDICARE PREVENTIVE VISIT: ICD-10-CM

## 2019-10-04 PROCEDURE — G0403 EKG FOR INITIAL PREVENT EXAM: HCPCS | Performed by: FAMILY MEDICINE

## 2019-10-04 PROCEDURE — G0402 INITIAL PREVENTIVE EXAM: HCPCS | Performed by: FAMILY MEDICINE

## 2019-10-04 RX ORDER — ALPRAZOLAM 0.25 MG/1
0.25 TABLET ORAL NIGHTLY PRN
COMMUNITY
End: 2019-11-01 | Stop reason: SDUPTHER

## 2019-10-04 ASSESSMENT — ACTIVITIES OF DAILY LIVING (ADL): BATHING_REQUIRES_ASSISTANCE: 0

## 2019-10-04 ASSESSMENT — ENCOUNTER SYMPTOMS: GENERAL WELL-BEING: EXCELLENT

## 2019-10-04 ASSESSMENT — PATIENT HEALTH QUESTIONNAIRE - PHQ9: CLINICAL INTERPRETATION OF PHQ2 SCORE: 0

## 2019-10-04 NOTE — PROGRESS NOTES
CC:   Medicare Annual Wellness Visit    HPI:  Evan is a 63 y.o. Male here for his Welcome to Medicare Wellness Visit. He remains very busy with work and international travel and suffers from travel related primary insomnia controlled with low dose Xanax use PRN. He uses this controlled medication in a safe manner and denies medication related side effects. He has chronic low testosterone managed with daily Testosterone gel use, and chronic essential HTN on daily Losartan and Amlodipine. He also reports erectile dysfunction of unclear etiology and uses Cialis PRN for symptom control. He is aware of overdue colonoscopy at [facility] and is now willing to obtain this important screening test. He has reviewed labs done 9/27/19 and noted elevated cholesterol levels - he attributes it to stress, lack of exercise and less dietary restraint over the summer months. He endorses 100% medication compliance and remains an avid golfer. He is in excellent spirits today at our visit.     Patient Active Problem List    Diagnosis Date Noted   • Mixed dyslipidemia 10/01/2018   • Other male erectile dysfunction 10/01/2018   • History of squamous cell carcinoma of skin 03/24/2018   • Primary insomnia 03/23/2018   • Low testosterone level in male 06/13/2017   • Essential hypertension 06/13/2017   • Tubular adenoma of colon 01/01/2014     Current Outpatient Medications   Medication Sig Dispense Refill   • Ascorbic Acid (VITAMIN C PO) Take  by mouth.     • ALPRAZolam (XANAX) 0.25 MG Tab Take 0.25 mg by mouth at bedtime as needed for Sleep.     • Testosterone (ANDROGEL) 20.25 MG/1.25GM (1.62%) Gel Apply 20.25 mg to skin as directed every day for 180 days. 90 Packet 1   • losartan (COZAAR) 50 MG Tab TAKE ONE TABLET BY MOUTH EVERY DAY 90 Tab 3   • amLODIPine (NORVASC) 10 MG Tab TAKE ONE TABLET BY MOUTH EVERY DAY 90 Tab 3   • tadalafil (CIALIS) 20 MG tablet Take 1 Tab by mouth as needed for Erectile Dysfunction. Max: 1 dose per 24 hours. 10 Tab 6   •  Cholecalciferol (VITAMIN D) 2000 units Cap Take 2,000 Units by mouth every day.     • B Complex Vitamins (B COMPLEX 1 PO) Take 1 Tab by mouth every day.       No current facility-administered medications for this visit.       Current supplements: see MAR  Chronic narcotic pain medicines: no  Allergies: Patient has no known allergies.  Exercise: yes  Current social contact/activities: yes  Current mood: good  Advance Directive on file: no    Screening:  Depression Screening    Little interest or pleasure in doing things?  0 - not at all  Feeling down, depressed , or hopeless? 0 - not at all  Patient Health Questionnaire Score: 0     Screening for Cognitive Impairment    Three Minute Recall (village, kitchen, baby) 3/3    Larry clock face with all 12 numbers and set the hands to show 10 past 10.  Yes    Cognitive concerns identified deferred for follow up unless specifically addressed in assessment and plan.    Fall Risk Assessment    Has the patient had two or more falls in the last year or any fall with injury in the last year?  No    Safety Assessment    Throw rugs on floor.  No  Handrails on all stairs.  Yes  Good lighting in all hallways.  Yes  Difficulty hearing.  No  Patient counseled about all safety risks that were identified.    Functional Assessment ADLs    Are there any barriers preventing you from cooking for yourself or meeting nutritional needs?  No.    Are there any barriers preventing you from driving safely or obtaining transportation?  No.    Are there any barriers preventing you from using a telephone or calling for help?  No.    Are there any barriers preventing you from shopping?  No.    Are there any barriers preventing you from taking care of your own finances?  No.    Are there any barriers preventing you from managing your medications?  No.    Are there any barriers preventing you from showering, bathing or dressing yourself?  No.    Are you currently engaging in any exercise or physical  activity?  Yes.     What is your perception of your health?  Excellent.      Health Maintenance Summary                Annual Wellness Visit Overdue 1955     COLONOSCOPY Overdue 9/26/2019      Done 9/26/2014 REFERRAL TO GI FOR COLONOSCOPY     Patient has more history with this topic...    IMM ZOSTER VACCINES Postponed 9/1/2022 Originally 10/27/2017. Provider advises postponing for medical reasons     Done 9/1/2017 Imm Admin: Zoster Vaccine Live (ZVL) (Zostavax)    IMM PNEUMOCOCCAL VACCINE: 65+ Years Next Due 4/23/2020      Done 4/23/2019 Imm Admin: Pneumococcal Conjugate Vaccine (Prevnar/PCV-13)    IMM DTaP/Tdap/Td Vaccine Next Due 7/23/2024      Done 7/23/2014 Imm Admin: Tdap Vaccine          Patient Care Team:  Adriana Hinson M.D. as PCP - General (Family Medicine)  Nicole Phan R.N. as Registered Nurse      Social History     Tobacco Use   • Smoking status: Never Smoker   • Smokeless tobacco: Never Used   Substance Use Topics   • Alcohol use: Yes     Comment: wine daily   • Drug use: No     Family History   Problem Relation Age of Onset   • No Known Problems Mother    • Cancer Father         liver CA   • Alcohol/Drug Father      He  has a past medical history of Hypertension, Insomnia, and Testosterone insufficiency.   Past Surgical History:   Procedure Laterality Date   • FOOT SURGERY Left 2010    left foot neuroma       ROS:    All positives noted in HPI. All others reviewed and are negative.    Ostomy or other tubes or amputations: no  Chronic oxygen use: no  Last eye exam: UTD per pt report  : denies urinary incontinence; does not interfere with ADLs/ sleep  Gait: stable  Problems with balance/ difficulty walking: no  Hearing: good  Dentition: adequate    Lab results 9/27/19 reviewed with patient at visit today.     EKG done at visit today and interpreted by me: NSR with HR:55    Exam:   /75 (BP Location: Left arm, Patient Position: Sitting, BP Cuff Size: Adult)   Pulse (!) 59   Temp  "36.8 °C (98.2 °F) (Temporal)   Resp 17   Ht 1.854 m (6' 1\")   Wt 89.8 kg (198 lb)   SpO2 95%  Body mass index is 26.12 kg/m².    Gen: Well developed; well nourished; no acute distress; age appropriate appearance   HEENT: Normocephalic; atraumatic; PEERLA b/l; sclera clear b/l; b/l external auditory canals WNL; b/l TM WNL; nares patent; oropharynx clear; oral mucosa moist; tongue midline; dentition adequate   Neck: No adenopathy; no thyromegaly  CV: Regular rate and rhythm; S1/ S2 present; no murmur, gallop or rub noted  Pulm: No respiratory distress; clear to ascultation b/l; no wheezing or stridor noted b/l  Abd: Adequate bowel sounds noted; soft and nontender; no rebound, rigidity, nor distention  Extremities: No peripheral edema b/l LE extremities/ no clubbing nor cyanosis noted  Skin: Warm and dry; no rashes noted   Neuro: No focal deficits noted; pt is able to get up out of chair unassisted and walk forward  Psych: AAOx4; mood and affect are appropriate  BOUCHRA will be done at colonoscopy    Assessment and Plan:  1. Screening for colorectal cancer  Pt is due for colonoscopy at [facility] and is now ready to schedule this exam. Referral sent to [facility] today.   - REFERRAL TO  FOR COLONOSCOPY  - Initial Annual Wellness Visit - Includes PPPS ()    2. Mixed dyslipidemia  Uncontrolled as compared to April 2019. Pt is aware that decreased exercise, increased stress, and poor diet has led to this condition. Recommend that he make appropriate diet and exercise improvements and repeat fasting lipid panel in 3-4 months.   - Lipid Profile; Future  - Initial Annual Wellness Visit - Includes PPPS ()    3. Low testosterone level in male  Stable/ pt is to continue current testosterone use.   - Initial Annual Wellness Visit - Includes PPPS ()    4. Essential hypertension  Stable/ pt is to continue daily Losartan and Amlodipine.   - Initial Annual Wellness Visit - Includes PPPS ()    5. Primary insomnia  Stable/ " mainly travel related and controlled with infrequent PRN Xanax use.   - Initial Annual Wellness Visit - Includes PPPS ()    6. History of squamous cell carcinoma of skin  Pt continues to have regular dermatology follow up visits for skin checks.   - Initial Annual Wellness Visit - Includes PPPS ()    7. Other male erectile dysfunction  Stable/ controlled with PRN Cialis use  - Initial Annual Wellness Visit - Includes PPPS ()    8. Welcome to Medicare preventive visit  Pt is stable at this time and has an excellent sense of his overall health conditions.   - Initial Annual Wellness Visit - Includes PPPS ()       Services needed: no new services needed at this time  Health Care Screening: recommendations as per orders if indicated.  Referrals offered: [facility]  Counseling provided today:  · Prevent falls and reduce trip hazards; Secure or remove rugs if present   · Maintain working fire alarm and carbon monoxide detectors   · Engage in regular physical activity daily and social activities weekly as tolerated

## 2019-10-16 ENCOUNTER — OFFICE VISIT (OUTPATIENT)
Dept: INTERNAL MEDICINE | Facility: IMAGING CENTER | Age: 64
End: 2019-10-16
Payer: MEDICARE

## 2019-10-16 VITALS
OXYGEN SATURATION: 100 % | DIASTOLIC BLOOD PRESSURE: 68 MMHG | TEMPERATURE: 98.3 F | WEIGHT: 198.19 LBS | RESPIRATION RATE: 17 BRPM | BODY MASS INDEX: 26.27 KG/M2 | SYSTOLIC BLOOD PRESSURE: 132 MMHG | HEIGHT: 73 IN | HEART RATE: 68 BPM

## 2019-10-16 DIAGNOSIS — J30.89 SEASONAL ALLERGIC RHINITIS DUE TO OTHER ALLERGIC TRIGGER: ICD-10-CM

## 2019-10-16 PROCEDURE — 99214 OFFICE O/P EST MOD 30 MIN: CPT | Performed by: FAMILY MEDICINE

## 2019-10-16 RX ORDER — MONTELUKAST SODIUM 10 MG/1
10 TABLET ORAL DAILY
Qty: 14 TAB | Refills: 0 | Status: SHIPPED | OUTPATIENT
Start: 2019-10-16 | End: 2019-10-30

## 2019-10-16 NOTE — PROGRESS NOTES
"Chief Complaint   Patient presents with   • Allergic Rhinitis     Started Friday.       HPI:  Patient is a 63 y.o. male established patient who presents today for evaluation of new seasonal allergy driven symptoms that started last Friday. He reports increased nasal congestion, itchy eyes, and evening post nasal drip cough when laying flat. He denies associated N/V/D/F/bowel or bladder changes and has been taking Zyrtec and Flonase daily. He came in to see me at the urging of his wife but otherwise feels well at this time.     Patient Active Problem List    Diagnosis Date Noted   • Mixed dyslipidemia 10/01/2018   • Other male erectile dysfunction 10/01/2018   • History of squamous cell carcinoma of skin 03/24/2018   • Primary insomnia 03/23/2018   • Low testosterone level in male 06/13/2017   • Essential hypertension 06/13/2017   • Tubular adenoma of colon 01/01/2014       Past medical, surgical, family, and social history was reviewed in Epic chart by me today.     Medications and allergies reviewed and updated in Epic chart by me today.     ROS:  Pertinent positives listed above in HPI. All other systems have been reviewed and are negative.    PE:   /68 (BP Location: Left arm, Patient Position: Sitting, BP Cuff Size: Adult)   Pulse 68   Temp 36.8 °C (98.3 °F) (Temporal)   Resp 17   Ht 1.854 m (6' 1\")   Wt 89.9 kg (198 lb 3.1 oz)   SpO2 100%   BMI 26.15 kg/m²   Vital signs reviewed with patient.     Gen: Well developed; well nourished; no acute distress; non toxic appearance   HEENT: Normocephalic; atraumatic; PEERLA b/l; sclera clear b/l; b/l external auditory canals WNL; b/l TM WNL; nares patent; oropharynx clear; oral mucosa moist; tongue midline; dentition adequate   Neck: No adenopathy; no thyromegaly  CV: Regular rate and rhythm; S1/ S2 present; no murmur, gallop or rub noted  Pulm: No respiratory distress; clear to ascultation b/l; no wheezing or stridor noted b/l  Abd: Adequate bowel sounds " noted; soft and nontender; no rebound, rigidity, nor distention   Extremities: No peripheral edema b/l LE extremities/ no clubbing nor cyanosis noted  Skin: Warm and dry; no rashes noted   Neuro: No focal deficits noted   Psych: AAOx4; mood and affect are appropriate    A/P:  1. Seasonal allergic rhinitis due to other allergic trigger  We discussed this condition at length and I agree that his symptoms are likely allergy mediated. Recommend continuing daily Zyrtec, continue to use Flonase BID, add Singulair for 14 days to help break nighttime post nasal drip cough cycle, sleep with head propped up, rest, hydrate, and follow clinical response. New RX sent to pharmacy, and patient is to contact our office if current clinical condition does not improve with treatment plans detailed above.   - montelukast (SINGULAIR) 10 MG Tab; Take 1 Tab by mouth every day for 14 days.  Dispense: 14 Tab; Refill: 0

## 2019-11-01 DIAGNOSIS — F51.01 PRIMARY INSOMNIA: ICD-10-CM

## 2019-11-01 RX ORDER — ALPRAZOLAM 0.25 MG/1
0.25 TABLET ORAL NIGHTLY PRN
Qty: 30 TAB | Refills: 2 | Status: SHIPPED
Start: 2019-11-01 | End: 2019-12-01

## 2019-12-02 RX ORDER — AMLODIPINE BESYLATE 10 MG/1
TABLET ORAL
Qty: 90 TAB | Refills: 3 | Status: SHIPPED | OUTPATIENT
Start: 2019-12-02 | End: 2020-12-07

## 2019-12-02 RX ORDER — LOSARTAN POTASSIUM 50 MG/1
TABLET ORAL
Qty: 90 TAB | Refills: 3 | Status: SHIPPED | OUTPATIENT
Start: 2019-12-02 | End: 2020-12-07

## 2019-12-11 ENCOUNTER — APPOINTMENT (RX ONLY)
Dept: URBAN - METROPOLITAN AREA CLINIC 35 | Facility: CLINIC | Age: 64
Setting detail: DERMATOLOGY
End: 2019-12-11

## 2019-12-11 DIAGNOSIS — Z87.2 PERSONAL HISTORY OF DISEASES OF THE SKIN AND SUBCUTANEOUS TISSUE: ICD-10-CM

## 2019-12-11 DIAGNOSIS — Z71.89 OTHER SPECIFIED COUNSELING: ICD-10-CM

## 2019-12-11 DIAGNOSIS — D22 MELANOCYTIC NEVI: ICD-10-CM

## 2019-12-11 DIAGNOSIS — L20.89 OTHER ATOPIC DERMATITIS: ICD-10-CM

## 2019-12-11 DIAGNOSIS — Z85.828 PERSONAL HISTORY OF OTHER MALIGNANT NEOPLASM OF SKIN: ICD-10-CM

## 2019-12-11 DIAGNOSIS — L57.0 ACTINIC KERATOSIS: ICD-10-CM

## 2019-12-11 DIAGNOSIS — L81.4 OTHER MELANIN HYPERPIGMENTATION: ICD-10-CM

## 2019-12-11 PROBLEM — D48.5 NEOPLASM OF UNCERTAIN BEHAVIOR OF SKIN: Status: ACTIVE | Noted: 2019-12-11

## 2019-12-11 PROBLEM — D22.5 MELANOCYTIC NEVI OF TRUNK: Status: ACTIVE | Noted: 2019-12-11

## 2019-12-11 PROCEDURE — ? COUNSELING

## 2019-12-11 PROCEDURE — ? DEFER

## 2019-12-11 PROCEDURE — 99214 OFFICE O/P EST MOD 30 MIN: CPT | Mod: 25

## 2019-12-11 PROCEDURE — ? LIQUID NITROGEN

## 2019-12-11 PROCEDURE — ? TREATMENT REGIMEN

## 2019-12-11 PROCEDURE — 11102 TANGNTL BX SKIN SINGLE LES: CPT

## 2019-12-11 PROCEDURE — ? BIOPSY BY SHAVE METHOD

## 2019-12-11 PROCEDURE — 11103 TANGNTL BX SKIN EA SEP/ADDL: CPT

## 2019-12-11 PROCEDURE — 17000 DESTRUCT PREMALG LESION: CPT | Mod: 59

## 2019-12-11 ASSESSMENT — LOCATION SIMPLE DESCRIPTION DERM
LOCATION SIMPLE: LEFT UPPER BACK
LOCATION SIMPLE: LEFT CHEEK
LOCATION SIMPLE: RIGHT LOWER BACK
LOCATION SIMPLE: RIGHT FOREHEAD
LOCATION SIMPLE: NOSE
LOCATION SIMPLE: ABDOMEN
LOCATION SIMPLE: RIGHT ANTERIOR NECK
LOCATION SIMPLE: RIGHT CALF

## 2019-12-11 ASSESSMENT — LOCATION DETAILED DESCRIPTION DERM
LOCATION DETAILED: EPIGASTRIC SKIN
LOCATION DETAILED: RIGHT FOREHEAD
LOCATION DETAILED: RIGHT SUPERIOR ANTERIOR NECK
LOCATION DETAILED: LEFT INFERIOR UPPER BACK
LOCATION DETAILED: LEFT MEDIAL UPPER BACK
LOCATION DETAILED: RIGHT DISTAL CALF
LOCATION DETAILED: RIGHT SUPERIOR MEDIAL MIDBACK
LOCATION DETAILED: NASAL DORSUM
LOCATION DETAILED: LEFT INFERIOR CENTRAL MALAR CHEEK

## 2019-12-11 ASSESSMENT — LOCATION ZONE DERM
LOCATION ZONE: FACE
LOCATION ZONE: NOSE
LOCATION ZONE: LEG
LOCATION ZONE: TRUNK
LOCATION ZONE: NECK

## 2019-12-11 NOTE — PROCEDURE: TREATMENT REGIMEN
Detail Level: Zone
Samples Given: Eucrisa ointment apply twice a day
Continue Regimen: Desonide cream twice a day

## 2019-12-11 NOTE — PROCEDURE: BIOPSY BY SHAVE METHOD
Was A Bandage Applied: Yes
Wound Care: Petrolatum
Hide Anesthesia Volume?: No
Biopsy Method: Dermablade
Curettage Text: The wound bed was treated with curettage after the biopsy was performed.
Detail Level: Detailed
Path Notes Override (Will Replace All Of The Above Text): Biopsy by shave and curettage
Anesthesia Volume In Cc: 0.8
Lab: [id number]
Dressing: pressure dressing
Billing Type: Third-Party Bill
Depth Of Biopsy: dermis
Post-Care Instructions: I reviewed with the patient in detail post-care instructions. Patient is to keep the biopsy site dry overnight, and then apply white petrolatum twice daily until healed. Patient may apply hydrogen peroxide soaks to remove any crusting.
Lab Facility: 
Type Of Destruction Used: Curettage
Size Of Lesion In Cm: 0
Notification Instructions: Patient will be notified of biopsy results. However, patient instructed to call the office if not contacted within 2 weeks.
Consent: Written consent was obtained and risks were reviewed including but not limited to scarring, infection, bleeding, scabbing, incomplete removal, nerve damage and allergy to anesthesia.
Biopsy Type: H and E
Hemostasis: Aluminum Chloride
Anesthesia Type: 0.5% lidocaine with 1:200,000 epinephrine and a 1:10 solution of 8.4% sodium bicarbonate
Electrodesiccation And Curettage Text: The wound bed was treated with electrodesiccation and curettage after the biopsy was performed.
Anesthesia Volume In Cc: 1.7
Anesthesia Volume In Cc: 1.4

## 2019-12-11 NOTE — PROCEDURE: LIQUID NITROGEN
Consent: The patient's consent was obtained including but not limited to risks of crusting, scabbing, blistering, scarring, darker or lighter pigmentary change, recurrence, incomplete removal and infection.
Render Post-Care Instructions In Note?: no
Post-Care Instructions: I reviewed with the patient in detail post-care instructions. Patient is to wear sunprotection, and avoid picking at any of the treated lesions. Pt may apply Vaseline to crusted or scabbing areas.
Duration Of Freeze Thaw-Cycle (Seconds): 2
Number Of Freeze-Thaw Cycles: 2 freeze-thaw cycles
Detail Level: Detailed

## 2019-12-16 DIAGNOSIS — R79.89 LOW TESTOSTERONE LEVEL IN MALE: Chronic | ICD-10-CM

## 2019-12-16 RX ORDER — TESTOSTERONE 20.25 MG/1.25G
1 GEL TOPICAL DAILY
Qty: 607.5 MG | Refills: 5 | Status: SHIPPED
Start: 2019-12-16 | End: 2020-01-15

## 2019-12-23 ENCOUNTER — OFFICE VISIT (OUTPATIENT)
Dept: INTERNAL MEDICINE | Facility: IMAGING CENTER | Age: 64
End: 2019-12-23
Payer: MEDICARE

## 2019-12-23 VITALS
RESPIRATION RATE: 16 BRPM | DIASTOLIC BLOOD PRESSURE: 72 MMHG | HEIGHT: 73 IN | SYSTOLIC BLOOD PRESSURE: 145 MMHG | TEMPERATURE: 98.5 F | WEIGHT: 198.19 LBS | HEART RATE: 65 BPM | BODY MASS INDEX: 26.27 KG/M2 | OXYGEN SATURATION: 95 %

## 2019-12-23 DIAGNOSIS — J06.9 UPPER RESPIRATORY TRACT INFECTION, UNSPECIFIED TYPE: ICD-10-CM

## 2019-12-23 PROCEDURE — 99214 OFFICE O/P EST MOD 30 MIN: CPT | Performed by: FAMILY MEDICINE

## 2019-12-23 RX ORDER — AMOXICILLIN AND CLAVULANATE POTASSIUM 875; 125 MG/1; MG/1
1 TABLET, FILM COATED ORAL 2 TIMES DAILY
Qty: 14 TAB | Refills: 0 | Status: SHIPPED | OUTPATIENT
Start: 2019-12-23 | End: 2019-12-30

## 2019-12-23 NOTE — PROGRESS NOTES
"Chief Complaint   Patient presents with   • URI     since last Wed.       HPI:  Patient is a 63 y.o. male established patient who presents today for evaluation of new URI symptoms present since last Wednesday. He reports abrupt onset of sinus congestion, post nasal drip, dry cough (worse at night), and associated headache without associated N/V/D/F/bowel or bladder changes. He has been taking appropriate OTC medication without resolution and is concerned about becoming more sick over the holiday period.     Patient Active Problem List    Diagnosis Date Noted   • Mixed dyslipidemia 10/01/2018   • Other male erectile dysfunction 10/01/2018   • History of squamous cell carcinoma of skin 03/24/2018   • Primary insomnia 03/23/2018   • Low testosterone level in male 06/13/2017   • Essential hypertension 06/13/2017   • Tubular adenoma of colon 01/01/2014       Past medical, surgical, family, and social history was reviewed and updated in Epic chart by me today.     Medications and allergies reviewed and updated in Epic chart by me today.     ROS:  Pertinent positives listed above in HPI. All other systems have been reviewed and are negative.    PE:   /72 (BP Location: Left arm, Patient Position: Sitting, BP Cuff Size: Adult)   Pulse 65   Temp 36.9 °C (98.5 °F) (Temporal)   Resp 16   Ht 1.854 m (6' 1\")   Wt 89.9 kg (198 lb 3.1 oz)   SpO2 95%   BMI 26.15 kg/m²   Vital signs reviewed with patient.     Gen: Well developed; well nourished; no acute distress; non toxic appearance   HEENT: Normocephalic; atraumatic; PEERLA b/l; sclera clear b/l; b/l external auditory canals WNL; b/l TM WNL; nares patent; oropharynx clear but posterior aspect is red; oral mucosa moist; tongue midline; dentition adequate; congested  Neck: No adenopathy; no thyromegaly  CV: Regular rate and rhythm; S1/ S2 present; no murmur, gallop or rub noted  Pulm: No respiratory distress; clear to ascultation b/l; no wheezing or stridor noted b/l; " dry cough  Abd: Adequate bowel sounds noted; soft and nontender; no rebound, rigidity, nor distention  Extremities: No peripheral edema b/l LE extremities/ no clubbing nor cyanosis noted  Skin: Warm and dry; no rashes noted   Neuro: No focal deficits noted   Psych: AAOx4; mood and affect are appropriate    A/P:  1. Upper respiratory tract infection, unspecified type  Patient has been ill since last Wednesday, despite appropriate OTC medication use. Recommend patient start Augmentin today, use plain Mucinex BID, rest, hydrate, and use Tussionex BID PRN for cough control. Patient has used Tussionex in past with good results.  reviewed today and no concerns noted. Pt is well versed in safe use of controlled medication, and benefit of use outweighs risk at this time. New RX provided to patient today.   - amoxicillin-clavulanate (AUGMENTIN) 875-125 MG Tab; Take 1 Tab by mouth 2 times a day for 7 days.  Dispense: 14 Tab; Refill: 0  - Hydrocod Polst-CPM Polst ER (TUSSIONEX) 10-8 MG/5ML Suspension Extended Release; Take 5 mL by mouth every 12 hours as needed for Cough for up to 7 days.  Dispense: 70 mL; Refill: 0     Patient is to contact our office if current condition does not resolve with treatment plans detailed above.

## 2020-01-16 DIAGNOSIS — N52.8 OTHER MALE ERECTILE DYSFUNCTION: ICD-10-CM

## 2020-01-16 RX ORDER — TADALAFIL 20 MG/1
TABLET ORAL
Qty: 10 TAB | Refills: 6 | Status: SHIPPED | OUTPATIENT
Start: 2020-01-16 | End: 2020-01-16 | Stop reason: SDUPTHER

## 2020-01-20 ENCOUNTER — APPOINTMENT (RX ONLY)
Dept: URBAN - METROPOLITAN AREA CLINIC 35 | Facility: CLINIC | Age: 65
Setting detail: DERMATOLOGY
End: 2020-01-20

## 2020-01-20 PROBLEM — D48.5 NEOPLASM OF UNCERTAIN BEHAVIOR OF SKIN: Status: ACTIVE | Noted: 2020-01-20

## 2020-01-20 PROCEDURE — 11102 TANGNTL BX SKIN SINGLE LES: CPT

## 2020-01-20 PROCEDURE — ? BIOPSY BY SHAVE METHOD

## 2020-01-20 NOTE — PROCEDURE: BIOPSY BY SHAVE METHOD
Render In Bullet Format When Appropriate: No
Post-Care Instructions: I reviewed with the patient in detail post-care instructions. Patient is to keep the biopsy site dry overnight, and then apply white petrolatum twice daily until healed. Patient may apply hydrogen peroxide soaks to remove any crusting.
Billing Type: Third-Party Bill
Detail Level: Detailed
Was A Bandage Applied: Yes
Lab Facility: 
Anesthesia Volume In Cc: 2
Biopsy Method: Dermablade
Type Of Destruction Used: Electrodesiccation and Curettage
Consent: Written consent was obtained and risks were reviewed including but not limited to scarring, infection, bleeding, scabbing, incomplete removal, nerve damage and allergy to anesthesia.
Dressing: pressure dressing
Notification Instructions: Patient will be notified of biopsy results. However, patient instructed to call the office if not contacted within 2 weeks.
Size Of Lesion In Cm: 0
Electrodesiccation And Curettage Text: The wound bed was treated with electrodesiccation and curettage after biopsy
Hemostasis: Aluminum Chloride
Anesthesia Type: 0.5% lidocaine with 1:200,000 epinephrine and a 1:10 solution of 8.4% sodium bicarbonate
Path Notes Override (Will Replace All Of The Above Text): The wound bed was treated with electrodesiccation and curettage after the biopsy was
Biopsy Type: H and E
Curettage Text: The wound bed was treated with curettage after the biopsy was performed.
Depth Of Biopsy: dermis
Wound Care: Petrolatum
Lab: [id number]

## 2020-02-07 DIAGNOSIS — R05.9 COUGH: ICD-10-CM

## 2020-03-16 ENCOUNTER — APPOINTMENT (RX ONLY)
Dept: URBAN - METROPOLITAN AREA CLINIC 35 | Facility: CLINIC | Age: 65
Setting detail: DERMATOLOGY
End: 2020-03-16

## 2020-03-16 PROBLEM — D48.5 NEOPLASM OF UNCERTAIN BEHAVIOR OF SKIN: Status: ACTIVE | Noted: 2020-03-16

## 2020-03-16 PROCEDURE — ? BIOPSY BY SHAVE METHOD

## 2020-03-16 PROCEDURE — 11102 TANGNTL BX SKIN SINGLE LES: CPT

## 2020-03-16 NOTE — PROCEDURE: BIOPSY BY SHAVE METHOD
Detail Level: Detailed
Depth Of Biopsy: dermis
Was A Bandage Applied: Yes
Size Of Lesion In Cm: 0.7
X Size Of Lesion In Cm: 0
Biopsy Type: H and E
Biopsy Method: Dermablade
Anesthesia Type: 0.5% lidocaine with 1:200,000 epinephrine and a 1:10 solution of 8.4% sodium bicarbonate
Anesthesia Volume In Cc: 1.8
Hemostasis: Aluminum Chloride
Wound Care: Petrolatum
Dressing: pressure dressing
Type Of Destruction Used: Electrodesiccation and Curettage
Curettage Text: The wound bed was treated with curettage after the biopsy was performed.
Electrodesiccation And Curettage Text: The wound bed was treated with electrodesiccation and curettage after the biopsy was
Lab: [id number]
Lab Facility: 
Path Notes Override (Will Replace All Of The Above Text): Shave ny ED&C
Consent: Written consent was obtained and risks were reviewed including but not limited to scarring, infection, bleeding, scabbing, incomplete removal, nerve damage and allergy to anesthesia.
Post-Care Instructions: I reviewed with the patient in detail post-care instructions. Patient is to keep the biopsy site dry overnight, and then apply white petrolatum twice daily until healed. Patient may apply hydrogen peroxide soaks to remove any crusting.
Notification Instructions: Patient will be notified of biopsy results. However, patient instructed to call the office if not contacted within 2 weeks.
Billing Type: Third-Party Bill
Bill For Surgical Tray: no
Information: Selecting Yes will display possible errors in your note based on the variables you have selected. This validation is only offered as a suggestion for you. PLEASE NOTE THAT THE VALIDATION TEXT WILL BE REMOVED WHEN YOU FINALIZE YOUR NOTE. IF YOU WANT TO FAX A PRELIMINARY NOTE YOU WILL NEED TO TOGGLE THIS TO 'NO' IF YOU DO NOT WANT IT IN YOUR FAXED NOTE.

## 2020-03-18 DIAGNOSIS — F51.01 PRIMARY INSOMNIA: ICD-10-CM

## 2020-03-18 RX ORDER — ALPRAZOLAM 0.25 MG/1
0.25 TABLET ORAL NIGHTLY PRN
Qty: 30 TAB | Refills: 2 | Status: SHIPPED
Start: 2020-03-18 | End: 2020-04-17

## 2020-03-18 RX ORDER — ALPRAZOLAM 0.25 MG/1
0.25 TABLET ORAL NIGHTLY PRN
Qty: 30 TAB | Refills: 2
Start: 2020-03-18 | End: 2020-04-17

## 2020-06-16 ENCOUNTER — APPOINTMENT (RX ONLY)
Dept: URBAN - METROPOLITAN AREA CLINIC 35 | Facility: CLINIC | Age: 65
Setting detail: DERMATOLOGY
End: 2020-06-16

## 2020-06-16 DIAGNOSIS — Z87.2 PERSONAL HISTORY OF DISEASES OF THE SKIN AND SUBCUTANEOUS TISSUE: ICD-10-CM

## 2020-06-16 DIAGNOSIS — Z85.828 PERSONAL HISTORY OF OTHER MALIGNANT NEOPLASM OF SKIN: ICD-10-CM

## 2020-06-16 DIAGNOSIS — Z71.89 OTHER SPECIFIED COUNSELING: ICD-10-CM

## 2020-06-16 DIAGNOSIS — L82.1 OTHER SEBORRHEIC KERATOSIS: ICD-10-CM

## 2020-06-16 DIAGNOSIS — L57.0 ACTINIC KERATOSIS: ICD-10-CM

## 2020-06-16 DIAGNOSIS — L81.4 OTHER MELANIN HYPERPIGMENTATION: ICD-10-CM

## 2020-06-16 DIAGNOSIS — D22 MELANOCYTIC NEVI: ICD-10-CM

## 2020-06-16 DIAGNOSIS — L20.89 OTHER ATOPIC DERMATITIS: ICD-10-CM

## 2020-06-16 DIAGNOSIS — L72.8 OTHER FOLLICULAR CYSTS OF THE SKIN AND SUBCUTANEOUS TISSUE: ICD-10-CM

## 2020-06-16 PROBLEM — D22.5 MELANOCYTIC NEVI OF TRUNK: Status: ACTIVE | Noted: 2020-06-16

## 2020-06-16 PROCEDURE — 99214 OFFICE O/P EST MOD 30 MIN: CPT | Mod: 25

## 2020-06-16 PROCEDURE — 17003 DESTRUCT PREMALG LES 2-14: CPT

## 2020-06-16 PROCEDURE — ? TREATMENT REGIMEN

## 2020-06-16 PROCEDURE — ? COUNSELING

## 2020-06-16 PROCEDURE — ? LIQUID NITROGEN

## 2020-06-16 PROCEDURE — 17000 DESTRUCT PREMALG LESION: CPT

## 2020-06-16 ASSESSMENT — LOCATION SIMPLE DESCRIPTION DERM
LOCATION SIMPLE: RIGHT FOREHEAD
LOCATION SIMPLE: RIGHT HAND
LOCATION SIMPLE: RIGHT CALF
LOCATION SIMPLE: LEFT THIGH
LOCATION SIMPLE: RIGHT UPPER BACK
LOCATION SIMPLE: CHEST
LOCATION SIMPLE: RIGHT LOWER BACK
LOCATION SIMPLE: LEFT UPPER ARM
LOCATION SIMPLE: RIGHT UPPER ARM
LOCATION SIMPLE: RIGHT ANTERIOR NECK
LOCATION SIMPLE: LEFT FOREARM
LOCATION SIMPLE: LEFT CHEEK
LOCATION SIMPLE: LEFT POSTERIOR THIGH
LOCATION SIMPLE: RIGHT THIGH
LOCATION SIMPLE: RIGHT POSTERIOR THIGH
LOCATION SIMPLE: LEFT UPPER BACK

## 2020-06-16 ASSESSMENT — LOCATION DETAILED DESCRIPTION DERM
LOCATION DETAILED: LEFT ANTERIOR PROXIMAL THIGH
LOCATION DETAILED: LEFT MEDIAL UPPER BACK
LOCATION DETAILED: LEFT SUPERIOR UPPER BACK
LOCATION DETAILED: LEFT DISTAL POSTERIOR THIGH
LOCATION DETAILED: RIGHT PROXIMAL POSTERIOR THIGH
LOCATION DETAILED: RIGHT SUPERIOR MEDIAL UPPER BACK
LOCATION DETAILED: LEFT ANTERIOR DISTAL UPPER ARM
LOCATION DETAILED: RIGHT SUPERIOR ANTERIOR NECK
LOCATION DETAILED: LEFT DISTAL ULNAR DORSAL FOREARM
LOCATION DETAILED: LEFT INFERIOR UPPER BACK
LOCATION DETAILED: RIGHT FOREHEAD
LOCATION DETAILED: LEFT PROXIMAL DORSAL FOREARM
LOCATION DETAILED: RIGHT RADIAL DORSAL HAND
LOCATION DETAILED: RIGHT ANTERIOR PROXIMAL THIGH
LOCATION DETAILED: RIGHT PROXIMAL POSTERIOR UPPER ARM
LOCATION DETAILED: LEFT INFERIOR CENTRAL MALAR CHEEK
LOCATION DETAILED: LEFT DISTAL DORSAL FOREARM
LOCATION DETAILED: RIGHT LATERAL INFERIOR CHEST
LOCATION DETAILED: RIGHT SUPERIOR LATERAL UPPER BACK
LOCATION DETAILED: RIGHT MEDIAL INFERIOR CHEST
LOCATION DETAILED: RIGHT DISTAL CALF
LOCATION DETAILED: RIGHT SUPERIOR MEDIAL MIDBACK

## 2020-06-16 ASSESSMENT — LOCATION ZONE DERM
LOCATION ZONE: FACE
LOCATION ZONE: NECK
LOCATION ZONE: TRUNK
LOCATION ZONE: HAND
LOCATION ZONE: ARM
LOCATION ZONE: LEG

## 2020-06-23 DIAGNOSIS — R79.89 LOW TESTOSTERONE LEVEL IN MALE: ICD-10-CM

## 2020-06-23 RX ORDER — TESTOSTERONE 20.25 MG/1.25G
1 GEL TOPICAL DAILY
Qty: 75 G | Refills: 5 | Status: SHIPPED | OUTPATIENT
Start: 2020-06-23 | End: 2020-07-23

## 2020-08-07 ENCOUNTER — OFFICE VISIT (OUTPATIENT)
Dept: INTERNAL MEDICINE | Facility: IMAGING CENTER | Age: 65
End: 2020-08-07
Payer: MEDICARE

## 2020-08-07 VITALS
WEIGHT: 198.19 LBS | DIASTOLIC BLOOD PRESSURE: 73 MMHG | BODY MASS INDEX: 26.27 KG/M2 | RESPIRATION RATE: 17 BRPM | HEART RATE: 60 BPM | OXYGEN SATURATION: 94 % | TEMPERATURE: 97.8 F | SYSTOLIC BLOOD PRESSURE: 122 MMHG | HEIGHT: 73 IN

## 2020-08-07 DIAGNOSIS — F51.01 PRIMARY INSOMNIA: Chronic | ICD-10-CM

## 2020-08-07 DIAGNOSIS — Z23 NEED FOR VACCINATION: ICD-10-CM

## 2020-08-07 PROCEDURE — 99214 OFFICE O/P EST MOD 30 MIN: CPT | Mod: 25 | Performed by: FAMILY MEDICINE

## 2020-08-07 PROCEDURE — G0009 ADMIN PNEUMOCOCCAL VACCINE: HCPCS | Performed by: FAMILY MEDICINE

## 2020-08-07 PROCEDURE — 90732 PPSV23 VACC 2 YRS+ SUBQ/IM: CPT | Performed by: FAMILY MEDICINE

## 2020-08-07 RX ORDER — ALPRAZOLAM 0.25 MG/1
0.25 TABLET ORAL NIGHTLY PRN
Qty: 30 TAB | Refills: 2 | Status: SHIPPED | OUTPATIENT
Start: 2020-08-07 | End: 2020-09-06

## 2020-08-07 RX ORDER — ALPRAZOLAM 0.25 MG/1
TABLET ORAL
COMMUNITY
Start: 2020-05-30 | End: 2020-08-07

## 2020-08-07 ASSESSMENT — PATIENT HEALTH QUESTIONNAIRE - PHQ9: CLINICAL INTERPRETATION OF PHQ2 SCORE: 0

## 2020-08-07 ASSESSMENT — FIBROSIS 4 INDEX: FIB4 SCORE: 1.14

## 2020-08-07 NOTE — PROGRESS NOTES
"Chief Complaint   Patient presents with   • Insomnia     Alprazolam   • Immunizations     Pneumovax       HPI:  Patient is a 64 y.o. male established patient who presents today to obtain new Xanax prescription to control chronic primary insomnia. He continues to use Xanax in a safe manner, continues to benefit from controlled medication use, and denies medication-related side effects. He has been staying well overall and is due for Pneumovax today.     Patient Active Problem List    Diagnosis Date Noted   • Mixed dyslipidemia 10/01/2018   • Other male erectile dysfunction 10/01/2018   • History of squamous cell carcinoma of skin 03/24/2018   • Primary insomnia 03/23/2018   • Low testosterone level in male 06/13/2017   • Essential hypertension 06/13/2017   • Tubular adenoma of colon 01/01/2014       Past medical, surgical, family, and social history was reviewed and updated in Epic chart by me today.     Medications and allergies reviewed and updated in Epic chart by me today.     ROS:  Pertinent positives listed above in HPI. All other systems have been reviewed and are negative.    PE:   /73 (BP Location: Left arm, Patient Position: Sitting, BP Cuff Size: Adult)   Pulse 60   Temp 36.6 °C (97.8 °F) (Temporal)   Resp 17   Ht 1.854 m (6' 1\")   Wt 89.9 kg (198 lb 3.1 oz)   SpO2 94%   BMI 26.15 kg/m²   Vital signs reviewed with patient.     Gen: Well developed; well nourished; no acute distress; age appropriate appearance   CV: Regular rate and rhythm; S1/ S2 present; no murmur, gallop or rub noted  Pulm: No respiratory distress; clear to ascultation b/l; no wheezing or stridor noted b/l  Abd: Adequate bowel sounds noted; soft and nontender; no rebound, rigidity, nor distention  Extremities: No peripheral edema b/l LE extremities/ no clubbing nor cyanosis noted  Skin: Warm and dry; no rashes noted   Neuro: No focal deficits noted   Psych: AAOx4; mood and affect are appropriate    A/P:  1. Need for " vaccination  Vaccine administered at visit today.   - PneumoVax PPV23 =>3yo    2. Primary insomnia  Stable/ condition controlled with PRN Xanax use.  reviewed today and no concerns noted. Pt is well versed in safe use of controlled medication, and benefit of use outweighs risk at this time. New RX sent to pharmacy.  - ALPRAZolam (XANAX) 0.25 MG Tab; Take 1 Tab by mouth at bedtime as needed for Sleep for up to 30 days.  Dispense: 30 Tab; Refill: 2    Patient will return next month for annual fasting labs, and Jimena HILTON will obtain colonoscopy report for our records.

## 2020-09-21 DIAGNOSIS — R53.83 OTHER FATIGUE: ICD-10-CM

## 2020-09-21 DIAGNOSIS — R79.89 LOW TESTOSTERONE LEVEL IN MALE: Chronic | ICD-10-CM

## 2020-09-21 DIAGNOSIS — I10 ESSENTIAL HYPERTENSION: Chronic | ICD-10-CM

## 2020-09-21 DIAGNOSIS — E55.9 VITAMIN D DEFICIENCY: ICD-10-CM

## 2020-09-21 DIAGNOSIS — Z12.5 SCREENING FOR PROSTATE CANCER: ICD-10-CM

## 2020-09-21 DIAGNOSIS — E78.2 MIXED DYSLIPIDEMIA: ICD-10-CM

## 2020-09-22 ENCOUNTER — HOSPITAL ENCOUNTER (OUTPATIENT)
Facility: MEDICAL CENTER | Age: 65
End: 2020-09-22
Attending: FAMILY MEDICINE
Payer: MEDICARE

## 2020-09-22 ENCOUNTER — NON-PROVIDER VISIT (OUTPATIENT)
Dept: INTERNAL MEDICINE | Facility: IMAGING CENTER | Age: 65
End: 2020-09-22
Payer: MEDICARE

## 2020-09-22 DIAGNOSIS — I10 ESSENTIAL HYPERTENSION: Chronic | ICD-10-CM

## 2020-09-22 DIAGNOSIS — R53.83 OTHER FATIGUE: ICD-10-CM

## 2020-09-22 DIAGNOSIS — Z12.5 SCREENING FOR PROSTATE CANCER: ICD-10-CM

## 2020-09-22 DIAGNOSIS — E78.2 MIXED DYSLIPIDEMIA: ICD-10-CM

## 2020-09-22 DIAGNOSIS — E55.9 VITAMIN D DEFICIENCY: ICD-10-CM

## 2020-09-22 DIAGNOSIS — R79.89 LOW TESTOSTERONE LEVEL IN MALE: Chronic | ICD-10-CM

## 2020-09-22 DIAGNOSIS — Z23 NEED FOR VACCINATION: ICD-10-CM

## 2020-09-22 LAB
25(OH)D3 SERPL-MCNC: 98 NG/ML (ref 30–100)
ALBUMIN SERPL BCP-MCNC: 4.8 G/DL (ref 3.2–4.9)
ALBUMIN/GLOB SERPL: 2.4 G/DL
ALP SERPL-CCNC: 54 U/L (ref 30–99)
ALT SERPL-CCNC: 25 U/L (ref 2–50)
ANION GAP SERPL CALC-SCNC: 11 MMOL/L (ref 7–16)
AST SERPL-CCNC: 19 U/L (ref 12–45)
BASOPHILS # BLD AUTO: 0.9 % (ref 0–1.8)
BASOPHILS # BLD: 0.05 K/UL (ref 0–0.12)
BILIRUB SERPL-MCNC: 0.8 MG/DL (ref 0.1–1.5)
BUN SERPL-MCNC: 18 MG/DL (ref 8–22)
CALCIUM SERPL-MCNC: 9.8 MG/DL (ref 8.5–10.5)
CHLORIDE SERPL-SCNC: 102 MMOL/L (ref 96–112)
CHOLEST SERPL-MCNC: 182 MG/DL (ref 100–199)
CO2 SERPL-SCNC: 25 MMOL/L (ref 20–33)
CREAT SERPL-MCNC: 0.94 MG/DL (ref 0.5–1.4)
EOSINOPHIL # BLD AUTO: 0.26 K/UL (ref 0–0.51)
EOSINOPHIL NFR BLD: 4.9 % (ref 0–6.9)
ERYTHROCYTE [DISTWIDTH] IN BLOOD BY AUTOMATED COUNT: 44 FL (ref 35.9–50)
GLOBULIN SER CALC-MCNC: 2 G/DL (ref 1.9–3.5)
GLUCOSE SERPL-MCNC: 86 MG/DL (ref 65–99)
HCT VFR BLD AUTO: 51.4 % (ref 42–52)
HDLC SERPL-MCNC: 81 MG/DL
HGB BLD-MCNC: 17.7 G/DL (ref 14–18)
IMM GRANULOCYTES # BLD AUTO: 0.01 K/UL (ref 0–0.11)
IMM GRANULOCYTES NFR BLD AUTO: 0.2 % (ref 0–0.9)
LDLC SERPL CALC-MCNC: 92 MG/DL
LYMPHOCYTES # BLD AUTO: 1.52 K/UL (ref 1–4.8)
LYMPHOCYTES NFR BLD: 28.6 % (ref 22–41)
MCH RBC QN AUTO: 32.1 PG (ref 27–33)
MCHC RBC AUTO-ENTMCNC: 34.4 G/DL (ref 33.7–35.3)
MCV RBC AUTO: 93.3 FL (ref 81.4–97.8)
MONOCYTES # BLD AUTO: 0.4 K/UL (ref 0–0.85)
MONOCYTES NFR BLD AUTO: 7.5 % (ref 0–13.4)
NEUTROPHILS # BLD AUTO: 3.07 K/UL (ref 1.82–7.42)
NEUTROPHILS NFR BLD: 57.9 % (ref 44–72)
NRBC # BLD AUTO: 0 K/UL
NRBC BLD-RTO: 0 /100 WBC
PLATELET # BLD AUTO: 226 K/UL (ref 164–446)
PMV BLD AUTO: 10.2 FL (ref 9–12.9)
POTASSIUM SERPL-SCNC: 4.3 MMOL/L (ref 3.6–5.5)
PROT SERPL-MCNC: 6.8 G/DL (ref 6–8.2)
PSA SERPL-MCNC: 3.13 NG/ML (ref 0–4)
RBC # BLD AUTO: 5.51 M/UL (ref 4.7–6.1)
SODIUM SERPL-SCNC: 138 MMOL/L (ref 135–145)
TRIGL SERPL-MCNC: 44 MG/DL (ref 0–149)
TSH SERPL DL<=0.005 MIU/L-ACNC: 2.95 UIU/ML (ref 0.38–5.33)
WBC # BLD AUTO: 5.3 K/UL (ref 4.8–10.8)

## 2020-09-22 PROCEDURE — 80053 COMPREHEN METABOLIC PANEL: CPT

## 2020-09-22 PROCEDURE — 84270 ASSAY OF SEX HORMONE GLOBUL: CPT

## 2020-09-22 PROCEDURE — 84402 ASSAY OF FREE TESTOSTERONE: CPT

## 2020-09-22 PROCEDURE — 82306 VITAMIN D 25 HYDROXY: CPT

## 2020-09-22 PROCEDURE — 84153 ASSAY OF PSA TOTAL: CPT

## 2020-09-22 PROCEDURE — 80061 LIPID PANEL: CPT

## 2020-09-22 PROCEDURE — 85025 COMPLETE CBC W/AUTO DIFF WBC: CPT

## 2020-09-22 PROCEDURE — 90662 IIV NO PRSV INCREASED AG IM: CPT | Performed by: INTERNAL MEDICINE

## 2020-09-22 PROCEDURE — 84443 ASSAY THYROID STIM HORMONE: CPT

## 2020-09-22 PROCEDURE — G0008 ADMIN INFLUENZA VIRUS VAC: HCPCS | Performed by: INTERNAL MEDICINE

## 2020-09-22 PROCEDURE — 84403 ASSAY OF TOTAL TESTOSTERONE: CPT

## 2020-09-25 LAB
SHBG SERPL-SCNC: 35 NMOL/L (ref 11–80)
TESTOST FREE MFR SERPL: 1.9 % (ref 1.6–2.9)
TESTOST FREE SERPL-MCNC: 101 PG/ML (ref 47–244)
TESTOST SERPL-MCNC: 544 NG/DL (ref 300–720)

## 2020-10-05 ENCOUNTER — OFFICE VISIT (OUTPATIENT)
Dept: INTERNAL MEDICINE | Facility: IMAGING CENTER | Age: 65
End: 2020-10-05
Payer: MEDICARE

## 2020-10-05 VITALS
TEMPERATURE: 97.9 F | HEIGHT: 73 IN | WEIGHT: 192 LBS | RESPIRATION RATE: 17 BRPM | DIASTOLIC BLOOD PRESSURE: 75 MMHG | SYSTOLIC BLOOD PRESSURE: 130 MMHG | BODY MASS INDEX: 25.45 KG/M2 | OXYGEN SATURATION: 97 % | HEART RATE: 58 BPM

## 2020-10-05 DIAGNOSIS — N52.8 OTHER MALE ERECTILE DYSFUNCTION: ICD-10-CM

## 2020-10-05 DIAGNOSIS — E78.2 MIXED DYSLIPIDEMIA: ICD-10-CM

## 2020-10-05 DIAGNOSIS — F51.01 PRIMARY INSOMNIA: Chronic | ICD-10-CM

## 2020-10-05 DIAGNOSIS — R79.89 LOW TESTOSTERONE LEVEL IN MALE: Chronic | ICD-10-CM

## 2020-10-05 DIAGNOSIS — R00.0 EXERCISE-INDUCED TACHYCARDIA: ICD-10-CM

## 2020-10-05 DIAGNOSIS — Z00.00 ENCOUNTER FOR MEDICARE ANNUAL WELLNESS EXAM: ICD-10-CM

## 2020-10-05 DIAGNOSIS — Z85.828 HISTORY OF SQUAMOUS CELL CARCINOMA OF SKIN: ICD-10-CM

## 2020-10-05 DIAGNOSIS — Z12.5 SCREENING FOR PROSTATE CANCER: ICD-10-CM

## 2020-10-05 DIAGNOSIS — I10 ESSENTIAL HYPERTENSION: Chronic | ICD-10-CM

## 2020-10-05 PROCEDURE — G0439 PPPS, SUBSEQ VISIT: HCPCS | Performed by: FAMILY MEDICINE

## 2020-10-05 RX ORDER — DESONIDE 0.5 MG/G
CREAM TOPICAL
COMMUNITY
Start: 2020-08-25

## 2020-10-05 RX ORDER — ALPRAZOLAM 0.25 MG/1
TABLET ORAL
COMMUNITY
Start: 2020-09-07 | End: 2020-12-07

## 2020-10-05 RX ORDER — TESTOSTERONE 20.25 MG/1.25G
GEL TOPICAL
COMMUNITY
Start: 2020-08-22 | End: 2020-12-28

## 2020-10-05 ASSESSMENT — ACTIVITIES OF DAILY LIVING (ADL): BATHING_REQUIRES_ASSISTANCE: 0

## 2020-10-05 ASSESSMENT — ENCOUNTER SYMPTOMS: GENERAL WELL-BEING: EXCELLENT

## 2020-10-05 ASSESSMENT — PATIENT HEALTH QUESTIONNAIRE - PHQ9: CLINICAL INTERPRETATION OF PHQ2 SCORE: 0

## 2020-10-05 ASSESSMENT — FIBROSIS 4 INDEX: FIB4 SCORE: 1.11

## 2020-10-05 NOTE — PROGRESS NOTES
"CC:   Medicare Annual Wellness Visit    HPI:  Evan is a 64 y.o. male here for his Medicare Annual Wellness Visit and to review labs done 9/22/2020. He has history of primary insomnia controlled with low dose Xanax use PRN. He uses this controlled medication in a safe manner, benefits from ongoing use, and denies medication related side effects. He has chronic low testosterone managed with daily Testosterone gel use, and chronic essential HTN well controlled with daily Losartan and Amlodipine. He also reports erectile dysfunction of unclear etiology and uses Cialis PRN for symptom control. He has made diet and lifestyle changes in effort to reduce cholesterol numbers (previously elevated) and reports intermittent issues with \"high heart rate spikes\" while riding his Peloton. He now has a functioning heart rate monitor as well as the [employer] sirena in an attempt to capture data during these periods for further review. He feels great overall and endorses 100% medication compliance. He remains an avid golfer and is in good spirits today.     Patient Active Problem List    Diagnosis Date Noted   • Mixed dyslipidemia 10/01/2018   • Other male erectile dysfunction 10/01/2018   • History of squamous cell carcinoma of skin 03/24/2018   • Primary insomnia 03/23/2018   • Low testosterone level in male 06/13/2017   • Essential hypertension 06/13/2017   • Tubular adenoma of colon 01/01/2014     Current Outpatient Medications   Medication Sig Dispense Refill   • Testosterone 1.62 % Gel      • desonide (TRIDESILON) 0.05 % Cream      • ALPRAZolam (XANAX) 0.25 MG Tab      • tadalafil (CIALIS) 20 MG tablet Take 1 Tab by mouth as needed for Erectile Dysfunction (Max 1 dose per 24 hrs). 10 Tab 6   • losartan (COZAAR) 50 MG Tab TAKE ONE TABLET BY MOUTH EVERY DAY 90 Tab 3   • amLODIPine (NORVASC) 10 MG Tab TAKE ONE TABLET BY MOUTH EVERY DAY 90 Tab 3   • Ascorbic Acid (VITAMIN C PO) Take  by mouth.     • Cholecalciferol (VITAMIN D) 2000 units " Cap Take 2,000 Units by mouth every day.     • B Complex Vitamins (B COMPLEX 1 PO) Take 1 Tab by mouth every day.       No current facility-administered medications for this visit.       Current supplements: see MAR  Chronic narcotic pain medicines: no  Allergies: Patient has no known allergies.  Exercise: yes  Current social contact/activities: yes  Current mood: good  Advance Directive on file: no    Screening:  POLST/AD    Does the patient have a POLST or Advanced Directive?  yes    Depression Screening    Little interest or pleasure in doing things?  0 - not at all  Feeling down, depressed , or hopeless? 0 - not at all  Patient Health Questionnaire Score: 0     Screening for Cognitive Impairment    Three Minute Recall (river, nation, finger) 2/3    Larry clock face with all 12 numbers and set the hands to show 10 past 11.  Yes    Cognitive concerns identified deferred for follow up unless specifically addressed in assessment and plan.    Fall Risk Assessment    Has the patient had two or more falls in the last year or any fall with injury in the last year?  No    Safety Assessment    Throw rugs on floor.  No  Handrails on all stairs.  Yes  Good lighting in all hallways.  Yes  Difficulty hearing.  No  Patient counseled about all safety risks that were identified.    Functional Assessment ADLs    Are there any barriers preventing you from cooking for yourself or meeting nutritional needs?  No.    Are there any barriers preventing you from driving safely or obtaining transportation?  No.    Are there any barriers preventing you from using a telephone or calling for help?  No.    Are there any barriers preventing you from shopping?  No.    Are there any barriers preventing you from taking care of your own finances?  No.    Are there any barriers preventing you from managing your medications?  No.    Are there any barriers preventing you from showering, bathing or dressing yourself?  No.    Are you currently engaging  "in any exercise or physical activity?  Yes.     What is your perception of your health?  Excellent.      Health Maintenance Summary                IMM ZOSTER VACCINES Postponed 9/1/2022 Originally 10/27/2017. Provider advises postponing for medical reasons     Done 9/1/2017 Imm Admin: Zoster Vaccine Live (ZVL) (Zostavax) - HISTORICAL DATA    IMM DTaP/Tdap/Td Vaccine Next Due 7/23/2024      Done 7/23/2014 Imm Admin: Tdap Vaccine    COLONOSCOPY Next Due 12/18/2024      Done 12/18/2019 REFERRAL TO GI FOR COLONOSCOPY     Patient has more history with this topic...          Patient Care Team:  Adriana Hinson M.D. as PCP - General (Family Medicine)  Nicole Phan R.N. as Registered Nurse      Social History     Tobacco Use   • Smoking status: Never Smoker   • Smokeless tobacco: Never Used   Substance Use Topics   • Alcohol use: Yes     Comment: wine daily   • Drug use: No     Family History   Problem Relation Age of Onset   • No Known Problems Mother    • Cancer Father         liver CA   • Alcohol/Drug Father      He  has a past medical history of Hypertension, Insomnia, and Testosterone insufficiency.   Past Surgical History:   Procedure Laterality Date   • FOOT SURGERY Left 2010    left foot neuroma       ROS:    All positives noted in HPI. All others reviewed and are negative.    Ostomy or other tubes or amputations: no  Chronic oxygen use: no  Last eye exam: UTD per pt report  : denies urinary incontinence; does not interfere with ADLs/ sleep  Gait: stable  Problems with balance/ difficulty walking: no  Hearing: adequate  Dentition: adequate    Lab results 9/22/2020 reviewed with patient at visit today.     Exam:   /75 (BP Location: Left arm, Patient Position: Sitting, BP Cuff Size: Adult)   Pulse (!) 58   Temp 36.6 °C (97.9 °F) (Temporal)   Resp 17   Ht 1.854 m (6' 1\")   Wt 87.1 kg (192 lb)   SpO2 97%  Body mass index is 25.33 kg/m².    Gen: Well developed; well nourished; no acute distress; " age appropriate appearance   HEENT: Normocephalic; atraumatic; PEERLA b/l; sclera clear b/l; b/l external auditory canals WNL; b/l TM WNL; nares patent; oropharynx clear; mask in place  Neck: No adenopathy; no thyromegaly  CV: Regular rate and rhythm; S1/ S2 present; no murmur, gallop or rub noted  Pulm: No respiratory distress; clear to ascultation b/l; no wheezing or stridor noted b/l  Abd: Adequate bowel sounds noted; soft and nontender; no rebound, rigidity, nor distention  Extremities: No peripheral edema b/l LE extremities/ no clubbing nor cyanosis noted  Skin: Warm and dry; no rashes noted   Neuro: No focal deficits noted; pt is able to get up out of chair unassisted and walk forward  Psych: AAOx4; mood and affect are appropriate    Assessment and Plan:  1. Low testosterone level in male  Stable/ patient can continue daily testosterone gel use.     2. Essential hypertension  Stable/ patient can continue daily Cozaar and Norvasc use.     3. Primary insomnia  Stable/ controlled with PRN Xanax use for sleep control.     4. History of squamous cell carcinoma of skin  Patient has ongoing dermatology follow-up every six months for surveillance.     5. Mixed dyslipidemia  Markedly improved with diet and lifestyle changes. Encouragement provided to patient to continue healthy habits for ongoing control without statin use.     6. Other male erectile dysfunction  Stable/ controlled with PRN Cialis use.     7. Exercise-induced tachycardia  New condition for patient that occurs while riding Peloton. Patient denies associated chest pain nor shortness of breath and now has functioning heart rate monitor and [employer] sirena in order to compile data for further review. He will send me data accordingly for review.     8. Encounter for Medicare annual wellness exam  Patient is stable at this time and is UTD with HCM items. Patient will repeat PSA before end of the year for ongoing surveillance and will avoid prostate- stimulating  activities for at least 72 hours prior to lab draw.        Services needed: no new services needed at this time  Health Care Screening: recommendations as per orders if indicated.  Referrals offered: none  Counseling provided today:  · Prevent falls and reduce trip hazards; Secure or remove rugs if present   · Maintain working fire alarm and carbon monoxide detectors   · Engage in regular physical activity daily and social activities weekly as tolerated

## 2020-10-13 DIAGNOSIS — R05.9 COUGH: ICD-10-CM

## 2020-12-16 ENCOUNTER — APPOINTMENT (RX ONLY)
Dept: URBAN - METROPOLITAN AREA CLINIC 35 | Facility: CLINIC | Age: 65
Setting detail: DERMATOLOGY
End: 2020-12-16

## 2020-12-16 DIAGNOSIS — Z87.2 PERSONAL HISTORY OF DISEASES OF THE SKIN AND SUBCUTANEOUS TISSUE: ICD-10-CM

## 2020-12-16 DIAGNOSIS — Z85.828 PERSONAL HISTORY OF OTHER MALIGNANT NEOPLASM OF SKIN: ICD-10-CM

## 2020-12-16 DIAGNOSIS — L82.1 OTHER SEBORRHEIC KERATOSIS: ICD-10-CM

## 2020-12-16 DIAGNOSIS — D22 MELANOCYTIC NEVI: ICD-10-CM

## 2020-12-16 DIAGNOSIS — Z71.89 OTHER SPECIFIED COUNSELING: ICD-10-CM

## 2020-12-16 DIAGNOSIS — L81.4 OTHER MELANIN HYPERPIGMENTATION: ICD-10-CM

## 2020-12-16 DIAGNOSIS — L20.89 OTHER ATOPIC DERMATITIS: ICD-10-CM

## 2020-12-16 DIAGNOSIS — L57.0 ACTINIC KERATOSIS: ICD-10-CM

## 2020-12-16 PROBLEM — D22.5 MELANOCYTIC NEVI OF TRUNK: Status: ACTIVE | Noted: 2020-12-16

## 2020-12-16 PROCEDURE — ? COUNSELING

## 2020-12-16 PROCEDURE — 17003 DESTRUCT PREMALG LES 2-14: CPT

## 2020-12-16 PROCEDURE — 17000 DESTRUCT PREMALG LESION: CPT

## 2020-12-16 PROCEDURE — ? TREATMENT REGIMEN

## 2020-12-16 PROCEDURE — 99214 OFFICE O/P EST MOD 30 MIN: CPT | Mod: 25

## 2020-12-16 PROCEDURE — ? LIQUID NITROGEN

## 2020-12-16 ASSESSMENT — LOCATION DETAILED DESCRIPTION DERM
LOCATION DETAILED: LEFT SUPERIOR UPPER BACK
LOCATION DETAILED: LEFT MEDIAL UPPER BACK
LOCATION DETAILED: RIGHT SUPERIOR MEDIAL MIDBACK
LOCATION DETAILED: LEFT CLAVICULAR NECK
LOCATION DETAILED: RIGHT DISTAL CALF
LOCATION DETAILED: LEFT INFERIOR CENTRAL MALAR CHEEK
LOCATION DETAILED: RIGHT FOREHEAD
LOCATION DETAILED: RIGHT RADIAL DORSAL HAND
LOCATION DETAILED: RIGHT SUPERIOR LATERAL UPPER BACK
LOCATION DETAILED: RIGHT SUPERIOR ANTERIOR NECK
LOCATION DETAILED: LEFT PROXIMAL POSTERIOR UPPER ARM
LOCATION DETAILED: RIGHT PROXIMAL RADIAL DORSAL FOREARM
LOCATION DETAILED: LEFT INFERIOR UPPER BACK

## 2020-12-16 ASSESSMENT — LOCATION ZONE DERM
LOCATION ZONE: NECK
LOCATION ZONE: ARM
LOCATION ZONE: FACE
LOCATION ZONE: TRUNK
LOCATION ZONE: LEG
LOCATION ZONE: HAND

## 2020-12-16 ASSESSMENT — LOCATION SIMPLE DESCRIPTION DERM
LOCATION SIMPLE: RIGHT LOWER BACK
LOCATION SIMPLE: LEFT UPPER ARM
LOCATION SIMPLE: LEFT UPPER BACK
LOCATION SIMPLE: RIGHT FOREHEAD
LOCATION SIMPLE: LEFT CHEEK
LOCATION SIMPLE: LEFT ANTERIOR NECK
LOCATION SIMPLE: RIGHT UPPER BACK
LOCATION SIMPLE: RIGHT FOREARM
LOCATION SIMPLE: RIGHT CALF
LOCATION SIMPLE: RIGHT ANTERIOR NECK
LOCATION SIMPLE: RIGHT HAND

## 2020-12-16 NOTE — PROCEDURE: COUNSELING
Detail Level: Detailed
Patient Specific Counseling (Will Not Stick From Patient To Patient): Discussed patch testing today and patient is willing to get this done. We will refer him to [facility]
Detail Level: Zone
Detail Level: Generalized

## 2020-12-25 DIAGNOSIS — R79.89 LOW TESTOSTERONE LEVEL IN MALE: ICD-10-CM

## 2020-12-28 RX ORDER — TESTOSTERONE 20.25 MG/1.25G
GEL TOPICAL
Qty: 75 G | Refills: 5 | Status: SHIPPED | OUTPATIENT
Start: 2020-12-28 | End: 2021-02-26

## 2021-01-08 ENCOUNTER — NON-PROVIDER VISIT (OUTPATIENT)
Dept: INTERNAL MEDICINE | Facility: IMAGING CENTER | Age: 66
End: 2021-01-08
Payer: MEDICARE

## 2021-01-08 ENCOUNTER — HOSPITAL ENCOUNTER (OUTPATIENT)
Facility: MEDICAL CENTER | Age: 66
End: 2021-01-08
Attending: FAMILY MEDICINE
Payer: MEDICARE

## 2021-01-08 DIAGNOSIS — Z12.5 SCREENING FOR PROSTATE CANCER: ICD-10-CM

## 2021-01-08 PROCEDURE — 84153 ASSAY OF PSA TOTAL: CPT

## 2021-01-09 LAB — PSA SERPL-MCNC: 1.29 NG/ML (ref 0–4)

## 2021-03-01 ENCOUNTER — APPOINTMENT (RX ONLY)
Dept: URBAN - METROPOLITAN AREA CLINIC 35 | Facility: CLINIC | Age: 66
Setting detail: DERMATOLOGY
End: 2021-03-01

## 2021-03-01 DIAGNOSIS — L57.0 ACTINIC KERATOSIS: ICD-10-CM

## 2021-03-01 PROCEDURE — 96573 PDT DSTR PRMLG LES PHYS/QHP: CPT

## 2021-03-01 PROCEDURE — ? PDT: BLUE

## 2021-03-01 ASSESSMENT — LOCATION ZONE DERM
LOCATION ZONE: FACE
LOCATION ZONE: NOSE

## 2021-03-01 ASSESSMENT — LOCATION SIMPLE DESCRIPTION DERM
LOCATION SIMPLE: NOSE
LOCATION SIMPLE: LEFT FOREHEAD
LOCATION SIMPLE: LEFT CHEEK
LOCATION SIMPLE: RIGHT CHEEK
LOCATION SIMPLE: CHIN

## 2021-03-01 ASSESSMENT — LOCATION DETAILED DESCRIPTION DERM
LOCATION DETAILED: NASAL INFRATIP
LOCATION DETAILED: RIGHT CHIN
LOCATION DETAILED: LEFT SUPERIOR MEDIAL FOREHEAD
LOCATION DETAILED: RIGHT INFERIOR CENTRAL MALAR CHEEK
LOCATION DETAILED: LEFT CENTRAL MALAR CHEEK

## 2021-03-01 NOTE — PROCEDURE: PDT: BLUE
Show Anesthesia In Plan?: No
Post-Care Instructions: I reviewed with the patient in detail post-care instructions. Patient is to avoid sunlight for the next 2 days, and wear sun protection. Patients may expect sunburn like redness, discomfort and scabbing.
Comments: More than 15 AK’s were treated
Incubation End Time: 10:00
Lot # (Optional): [id number]
Who Performed The Pdt?: Performed by MD NAVIN, CARMEN or NP ([id number])
Illumination Time: 00:16:40
Treatment Number: 1
Light Source: Silvino-U
Detail Level: Zone
Expiration Date (Optional): 5/22
Ndc# (Optional): 41569-103-83
Show Medical Necessity In Plan?: Yes
Incubation Time: 1:30:00
Anesthesia Type: sterile water
Incubation Start Time: 8:30
Pre-Procedure Text: The treatment areas were cleaned and prepped with Acetone in the usual fashion.
Medical Necessity: Precancerous Lesions
Consent: Written consent obtained.  The risks were reviewed with the patient including but not limited to: pigmentary changes, pain, blistering, scabbing, redness, and the remote possibility of scarring.
Which Photosensitizer Was Used: Levulan
Total Number Of Aks Treated (Optional To Report): 0

## 2021-03-03 DIAGNOSIS — Z23 NEED FOR VACCINATION: ICD-10-CM

## 2021-03-16 ENCOUNTER — OFFICE VISIT (OUTPATIENT)
Dept: INTERNAL MEDICINE | Facility: IMAGING CENTER | Age: 66
End: 2021-03-16
Payer: MEDICARE

## 2021-03-16 VITALS
WEIGHT: 192.02 LBS | HEIGHT: 73 IN | BODY MASS INDEX: 25.45 KG/M2 | RESPIRATION RATE: 17 BRPM | OXYGEN SATURATION: 97 % | SYSTOLIC BLOOD PRESSURE: 130 MMHG | DIASTOLIC BLOOD PRESSURE: 70 MMHG | TEMPERATURE: 97.8 F | HEART RATE: 69 BPM

## 2021-03-16 DIAGNOSIS — F51.01 PRIMARY INSOMNIA: Chronic | ICD-10-CM

## 2021-03-16 PROCEDURE — 99214 OFFICE O/P EST MOD 30 MIN: CPT | Performed by: FAMILY MEDICINE

## 2021-03-16 RX ORDER — ALPRAZOLAM 0.25 MG/1
0.25 TABLET ORAL NIGHTLY PRN
Qty: 60 TABLET | Refills: 2 | Status: SHIPPED | OUTPATIENT
Start: 2021-03-16 | End: 2021-04-15

## 2021-03-16 ASSESSMENT — PATIENT HEALTH QUESTIONNAIRE - PHQ9: CLINICAL INTERPRETATION OF PHQ2 SCORE: 0

## 2021-03-16 ASSESSMENT — FIBROSIS 4 INDEX: FIB4 SCORE: 1.13

## 2021-03-16 NOTE — PROGRESS NOTES
"Chief Complaint   Patient presents with   • Insomnia     Alprazolam       HPI:  Patient is a 65 y.o. male established patient who presents today to obtain a new Xanax prescription to manage chronic primary insomnia. He uses this controlled medication in a safe manner, benefits from ongoing use, and denies medication related side effects. He reports that sometimes he needs to take an additional pill upon wakening at 1-2 am to fall back asleep. He denies feeling drowsy in the morning after medication use and has fully retired, which is minimizing his stressors. He is looking forward to obtaining second COVID vaccine in near future and is in good spirits today.     Patient Active Problem List    Diagnosis Date Noted   • Mixed dyslipidemia 10/01/2018   • Other male erectile dysfunction 10/01/2018   • History of squamous cell carcinoma of skin 03/24/2018   • Primary insomnia 03/23/2018   • Low testosterone level in male 06/13/2017   • Essential hypertension 06/13/2017   • Tubular adenoma of colon 01/01/2014       Past medical, surgical, family, and social history was reviewed and updated in Epic chart by me today.     Medications and allergies reviewed and updated in Epic chart by me today.     ROS:  Pertinent positives listed above in HPI. All other systems have been reviewed and are negative.    PE:   /70 (BP Location: Left arm, Patient Position: Sitting, BP Cuff Size: Adult)   Pulse 69   Temp 36.6 °C (97.8 °F) (Temporal)   Resp 17   Ht 1.854 m (6' 1\")   Wt 87.1 kg (192 lb 0.3 oz)   SpO2 97%   BMI 25.33 kg/m²   Vital signs reviewed with patient.     Gen: Well developed; well nourished; no acute distress; age appropriate appearance  CV: Regular rate and rhythm; S1/ S2 present; no murmur, gallop or rub noted  Pulm: No respiratory distress; clear to ascultation b/l; no wheezing or stridor noted b/l  Abd: Adequate bowel sounds noted; soft and nontender; no rebound, rigidity, nor distention  Extremities: No " peripheral edema b/l LE extremities/ no clubbing nor cyanosis noted  Skin: Warm and dry; no rashes noted   Neuro: No focal deficits noted   Psych: AAOx4; mood and affect are appropriate    A/P:  1. Primary insomnia  Ongoing issue for patient that sometimes requires him to take 2 tabs at night for appropriate sleep.  reviewed today and no concerns noted. Pt is well versed in safe use of controlled medication, and benefit of use outweighs risk at this time. New RX sent to pharmacy and patient is otherwise UTD with all HCM items at this time.   - ALPRAZolam (XANAX) 0.25 MG Tab; Take 1 tablet by mouth at bedtime as needed for Sleep for up to 30 days. May repeat dose x 1 if insomnia persists.  Dispense: 60 tablet; Refill: 2

## 2021-06-07 ENCOUNTER — OFFICE VISIT (OUTPATIENT)
Dept: INTERNAL MEDICINE | Facility: IMAGING CENTER | Age: 66
End: 2021-06-07
Payer: MEDICARE

## 2021-06-07 VITALS
BODY MASS INDEX: 25.58 KG/M2 | DIASTOLIC BLOOD PRESSURE: 74 MMHG | HEIGHT: 73 IN | OXYGEN SATURATION: 95 % | HEART RATE: 65 BPM | SYSTOLIC BLOOD PRESSURE: 142 MMHG | RESPIRATION RATE: 14 BRPM | WEIGHT: 193 LBS | TEMPERATURE: 98.3 F

## 2021-06-07 DIAGNOSIS — R00.0 TACHYCARDIA: ICD-10-CM

## 2021-06-07 DIAGNOSIS — F51.01 PRIMARY INSOMNIA: Chronic | ICD-10-CM

## 2021-06-07 DIAGNOSIS — E78.2 MIXED DYSLIPIDEMIA: ICD-10-CM

## 2021-06-07 DIAGNOSIS — I10 ESSENTIAL HYPERTENSION: ICD-10-CM

## 2021-06-07 DIAGNOSIS — Z12.5 PROSTATE CANCER SCREENING: ICD-10-CM

## 2021-06-07 DIAGNOSIS — R79.89 LOW TESTOSTERONE LEVEL IN MALE: ICD-10-CM

## 2021-06-07 PROCEDURE — 99215 OFFICE O/P EST HI 40 MIN: CPT | Performed by: FAMILY MEDICINE

## 2021-06-07 RX ORDER — TESTOSTERONE 20.25 MG/1.25G
GEL TOPICAL
COMMUNITY
Start: 2021-06-02 | End: 2021-08-06

## 2021-06-07 RX ORDER — DOXEPIN 3 MG/1
3 TABLET, FILM COATED ORAL
Qty: 30 TABLET | Refills: 2 | Status: SHIPPED
Start: 2021-06-07 | End: 2021-12-06

## 2021-06-07 RX ORDER — FLUTICASONE PROPIONATE 50 MCG
1 SPRAY, SUSPENSION (ML) NASAL DAILY
COMMUNITY

## 2021-06-07 RX ORDER — ALPRAZOLAM 0.25 MG/1
TABLET ORAL
COMMUNITY
Start: 2021-06-02 | End: 2021-12-06

## 2021-06-07 SDOH — ECONOMIC STABILITY: TRANSPORTATION INSECURITY
IN THE PAST 12 MONTHS, HAS LACK OF TRANSPORTATION KEPT YOU FROM MEETINGS, WORK, OR FROM GETTING THINGS NEEDED FOR DAILY LIVING?: NO

## 2021-06-07 SDOH — HEALTH STABILITY: PHYSICAL HEALTH: ON AVERAGE, HOW MANY DAYS PER WEEK DO YOU ENGAGE IN MODERATE TO STRENUOUS EXERCISE (LIKE A BRISK WALK)?: 7 DAYS

## 2021-06-07 SDOH — HEALTH STABILITY: PHYSICAL HEALTH: ON AVERAGE, HOW MANY MINUTES DO YOU ENGAGE IN EXERCISE AT THIS LEVEL?: 40 MIN

## 2021-06-07 SDOH — ECONOMIC STABILITY: TRANSPORTATION INSECURITY
IN THE PAST 12 MONTHS, HAS THE LACK OF TRANSPORTATION KEPT YOU FROM MEDICAL APPOINTMENTS OR FROM GETTING MEDICATIONS?: NO

## 2021-06-07 ASSESSMENT — LIFESTYLE VARIABLES
HOW MANY STANDARD DRINKS CONTAINING ALCOHOL DO YOU HAVE ON A TYPICAL DAY: 1 OR 2
HOW OFTEN DO YOU HAVE SIX OR MORE DRINKS ON ONE OCCASION: NEVER
HOW OFTEN DO YOU HAVE A DRINK CONTAINING ALCOHOL: 4 OR MORE TIMES A WEEK

## 2021-06-07 ASSESSMENT — FIBROSIS 4 INDEX: FIB4 SCORE: 1.13

## 2021-06-07 NOTE — PROGRESS NOTES
"Chief Complaint   Patient presents with   • Establish Care   • Insomnia   • Tachycardia     under exercise 160-170       Subjective:     HPI:   Evan Wright is a 65 y.o. male here to establish care and to discuss the evaluation and management of:     1.  Tachycardia: He reports since November 2019 he has noticed intermittent episodes which can last as long as 5 minutes  Initially he reports that he would be diaphoretic as well but over time he is learned how to breathe through them  That it can occur while he is exercising on his Peloton bike  The most recent one occurred last week and it happened while he was gardening    There has been no chest pain he has no cardiac history  He has no   smoking history  He does have a EKG type device for home use and reports that it has not read out as abnormal rhythm but the heart rates do get up to 160 or 170  Is normal resting heart rate is in the 60s      2.HTN- has had hypertension since the age of 14  He is doing well on current regimen      3.  Chronic insomnia-he has had this for many years and despite being retired he still struggles with staying asleep  Xanax does not seem to help as much anymore  He does exercise daily      4.see prob list for other updates     Objective:     /74   Pulse 65   Temp 36.8 °C (98.3 °F) (Temporal)   Resp 14   Ht 1.854 m (6' 0.99\")   Wt 87.5 kg (193 lb)   SpO2 95%  Body mass index is 25.47 kg/m².    Physical Exam:  Physical Exam  Constitutional: Well-developed and well-nourished. Not diaphoretic. No distress.   Skin: Skin is warm and dry. No rash noted.  Head: Atraumatic without lesions.  Eyes: Conjunctivae and extraocular motions are normal.   Ears:  External ears unremarkable.    Nose: Nares patent. Mucosa without edema or erythema. No discharge. No facial tenderness.     Neck: Supple, No thyromegaly present. No JVD  Cardiovascular: Regular rate and rhythm.   Chest: Effort normal. Clear to auscultation throughout. No " adventitious sounds.   Abdomen:  without distention.  .  Extremities: No cyanosis, clubbing, erythema, nor edema.   Neurological: Alert and oriented x 3.    Psychiatric:  Behavior, mood, and affect are appropriate     Assessment and Plan:     The following treatment plan was discussed/researched:  Essential hypertension  Cont to monitor  Cont present meds    Tachycardia  Zio patch monitor is ordered to r/o tachyarrhythmia      Low testosterone level in male  Check testosterone level    Primary insomnia  Cont w/ present regimen    1. Tachycardia  RIH ZIO PATCH MONITOR    CBC WITHOUT DIFFERENTIAL    TSH   2. Essential hypertension  Comp Metabolic Panel   3. Low testosterone level in male  Testosterone, Free & Total, Adult Male (w/SHBG)   4. Mixed dyslipidemia  Lipid Profile   5. Primary insomnia     6. Prostate cancer screening  PROSTATE SPECIFIC AG SCREENING                                                                                                                                                                                    Any change or worsening of signs or symptoms, patient encouraged to follow-up or report to emergency room for further evaluation. Patient verbalizes understanding and agrees.    Follow-Up: No follow-ups on file.      PLEASE NOTE: This dictation was created using voice recognition software. I have made every reasonable attempt to correct obvious errors, but I expect that there are errors of grammar and possibly content that I did not discover before finalizing the note.      My total time spent caring for the patient on the day of the encounter was  greater than 40 minutes.   This includes obtaining history, reviewing chart, physical exam, patient education, reviewing outside records, placing orders, interpreting tests and coordinating care.

## 2021-06-13 PROBLEM — R00.0 TACHYCARDIA: Status: ACTIVE | Noted: 2021-06-13

## 2021-06-16 ENCOUNTER — APPOINTMENT (RX ONLY)
Dept: URBAN - METROPOLITAN AREA CLINIC 35 | Facility: CLINIC | Age: 66
Setting detail: DERMATOLOGY
End: 2021-06-16

## 2021-06-16 DIAGNOSIS — Z85.828 PERSONAL HISTORY OF OTHER MALIGNANT NEOPLASM OF SKIN: ICD-10-CM

## 2021-06-16 DIAGNOSIS — L82.1 OTHER SEBORRHEIC KERATOSIS: ICD-10-CM

## 2021-06-16 DIAGNOSIS — L57.0 ACTINIC KERATOSIS: ICD-10-CM

## 2021-06-16 DIAGNOSIS — D485 NEOPLASM OF UNCERTAIN BEHAVIOR OF SKIN: ICD-10-CM | Status: INADEQUATELY CONTROLLED

## 2021-06-16 DIAGNOSIS — D22 MELANOCYTIC NEVI: ICD-10-CM

## 2021-06-16 DIAGNOSIS — L72.8 OTHER FOLLICULAR CYSTS OF THE SKIN AND SUBCUTANEOUS TISSUE: ICD-10-CM

## 2021-06-16 DIAGNOSIS — Z71.89 OTHER SPECIFIED COUNSELING: ICD-10-CM

## 2021-06-16 DIAGNOSIS — L81.4 OTHER MELANIN HYPERPIGMENTATION: ICD-10-CM

## 2021-06-16 DIAGNOSIS — Z87.2 PERSONAL HISTORY OF DISEASES OF THE SKIN AND SUBCUTANEOUS TISSUE: ICD-10-CM

## 2021-06-16 PROBLEM — D22.5 MELANOCYTIC NEVI OF TRUNK: Status: ACTIVE | Noted: 2021-06-16

## 2021-06-16 PROBLEM — D48.5 NEOPLASM OF UNCERTAIN BEHAVIOR OF SKIN: Status: ACTIVE | Noted: 2021-06-16

## 2021-06-16 PROCEDURE — ? SEPARATE AND IDENTIFIABLE DOCUMENTATION

## 2021-06-16 PROCEDURE — 11102 TANGNTL BX SKIN SINGLE LES: CPT

## 2021-06-16 PROCEDURE — 17000 DESTRUCT PREMALG LESION: CPT | Mod: 59

## 2021-06-16 PROCEDURE — ? SUNSCREEN RECOMMENDATIONS

## 2021-06-16 PROCEDURE — ? LIQUID NITROGEN

## 2021-06-16 PROCEDURE — 99214 OFFICE O/P EST MOD 30 MIN: CPT | Mod: 25

## 2021-06-16 PROCEDURE — ? BIOPSY BY SHAVE METHOD

## 2021-06-16 PROCEDURE — ? COUNSELING

## 2021-06-16 PROCEDURE — 17003 DESTRUCT PREMALG LES 2-14: CPT

## 2021-06-16 PROCEDURE — ? SURGICAL DECISION MAKING

## 2021-06-16 PROCEDURE — ? ADDITIONAL NOTES

## 2021-06-16 ASSESSMENT — LOCATION ZONE DERM
LOCATION ZONE: TRUNK
LOCATION ZONE: ARM
LOCATION ZONE: LEG
LOCATION ZONE: NOSE
LOCATION ZONE: FACE
LOCATION ZONE: EAR

## 2021-06-16 ASSESSMENT — LOCATION SIMPLE DESCRIPTION DERM
LOCATION SIMPLE: RIGHT LOWER BACK
LOCATION SIMPLE: RIGHT UPPER BACK
LOCATION SIMPLE: RIGHT CALF
LOCATION SIMPLE: RIGHT EAR
LOCATION SIMPLE: NOSE
LOCATION SIMPLE: LEFT UPPER BACK
LOCATION SIMPLE: RIGHT FOREARM
LOCATION SIMPLE: RIGHT FOREHEAD

## 2021-06-16 ASSESSMENT — LOCATION DETAILED DESCRIPTION DERM
LOCATION DETAILED: NASAL ROOT
LOCATION DETAILED: RIGHT SUPERIOR LATERAL UPPER BACK
LOCATION DETAILED: RIGHT DISTAL CALF
LOCATION DETAILED: RIGHT TRAGUS
LOCATION DETAILED: LEFT MEDIAL UPPER BACK
LOCATION DETAILED: LEFT INFERIOR UPPER BACK
LOCATION DETAILED: RIGHT FOREHEAD
LOCATION DETAILED: RIGHT SUPERIOR MEDIAL MIDBACK
LOCATION DETAILED: LEFT SUPERIOR UPPER BACK
LOCATION DETAILED: RIGHT PROXIMAL DORSAL FOREARM

## 2021-06-16 NOTE — PROCEDURE: BIOPSY BY SHAVE METHOD
Detail Level: Detailed
Depth Of Biopsy: dermis
Was A Bandage Applied: Yes
Size Of Lesion In Cm: 0
Biopsy Type: H and E
Biopsy Method: Dermablade
Anesthesia Type: 1% lidocaine with 1:100,000 epinephrine and a 1:12 solution of 8.4% sodium bicarbonate
Anesthesia Volume In Cc: 0.6
Hemostasis: Aluminum Chloride
Wound Care: Petrolatum
Dressing: Band-Aid
Type Of Destruction Used: Curettage
Curettage Text: The wound bed was treated with curettage after the biopsy was performed.
Electrodesiccation And Curettage Text: The wound bed was treated with electrodesiccation and curettage after the biopsy was
Lab: [id number]
Lab Facility: 
Consent: Written consent was obtained and risks were reviewed including but not limited to scarring, infection, bleeding, scabbing, incomplete removal, nerve damage and allergy to anesthesia.
Post-Care Instructions: I reviewed with the patient in detail post-care instructions. Patient is to keep the biopsy site dry overnight, and then apply white petrolatum twice daily until healed. Patient may apply hydrogen peroxide soaks to remove any crusting.
Notification Instructions: Patient will be notified of biopsy results. However, patient instructed to call the office if not contacted within 2 weeks.
Billing Type: Third-Party Bill
Bill For Surgical Tray: no
Information: Selecting Yes will display possible errors in your note based on the variables you have selected. This validation is only offered as a suggestion for you. PLEASE NOTE THAT THE VALIDATION TEXT WILL BE REMOVED WHEN YOU FINALIZE YOUR NOTE. IF YOU WANT TO FAX A PRELIMINARY NOTE YOU WILL NEED TO TOGGLE THIS TO 'NO' IF YOU DO NOT WANT IT IN YOUR FAXED NOTE.

## 2021-06-16 NOTE — PROCEDURE: SURGICAL DECISION MAKING
Discussion: We discussed not only the risks of the procedure but also the likely feldman that further treatment will be required to treat lesion. This could involve another visit here to destroy or excise  lesion, a visit to a Mohs or general or plastic surgeon, scarring, limited  activity, cosmetic imperfections.
Identified Risk Factors Documented?: yes
Complexity (Necessary For Coding; Major - 90 Day Global With Some Exceptions; Minor - 10 Day Global): minor
Risk Assessment Explanation (Does Not Render In The Note): Clinical determination of the probability and/or consequences of an event, such as surgery. Clinical assessment of the level of risk is affected by the nature of the event under consideration for the patient. Modifier 57 is used to indicate an Evaluation and Management (E/M) service resulted in the initial decision to perform surgery either the day before a major surgery (90 day global) or the day of a major surgery.
Date Of Surgery - Today Or Tomorrow?: today

## 2021-06-16 NOTE — PROCEDURE: LIQUID NITROGEN
Duration Of Freeze Thaw-Cycle (Seconds): 2
Number Of Freeze-Thaw Cycles: 2 freeze-thaw cycles
Render Post-Care Instructions In Note?: no
Detail Level: Detailed
Consent: The patient's consent was obtained including but not limited to risks of crusting, scabbing, blistering, scarring, darker or lighter pigmentary change, recurrence, incomplete removal and infection.
Post-Care Instructions: I reviewed with the patient in detail post-care instructions. Patient is to wear sunprotection, and avoid picking at any of the treated lesions. Pt may apply Vaseline to crusted or scabbing areas.

## 2021-06-16 NOTE — PROCEDURE: ADDITIONAL NOTES
Additional Notes: Pt wants cyst removed will come back in November for removal
Detail Level: Zone
Render Risk Assessment In Note?: no

## 2021-07-15 ENCOUNTER — APPOINTMENT (RX ONLY)
Dept: URBAN - METROPOLITAN AREA CLINIC 35 | Facility: CLINIC | Age: 66
Setting detail: DERMATOLOGY
End: 2021-07-15

## 2021-07-15 DIAGNOSIS — Z71.89 OTHER SPECIFIED COUNSELING: ICD-10-CM

## 2021-07-15 PROBLEM — D04.61 CARCINOMA IN SITU OF SKIN OF RIGHT UPPER LIMB, INCLUDING SHOULDER: Status: ACTIVE | Noted: 2021-07-15

## 2021-07-15 PROCEDURE — ? COUNSELING

## 2021-07-15 PROCEDURE — ? SUNSCREEN RECOMMENDATIONS

## 2021-07-15 PROCEDURE — 17261 DSTRJ MAL LES T/A/L .6-1.0CM: CPT

## 2021-07-15 PROCEDURE — 99212 OFFICE O/P EST SF 10 MIN: CPT | Mod: 25

## 2021-07-15 PROCEDURE — ? CURETTAGE AND DESTRUCTION

## 2021-07-15 NOTE — PROCEDURE: CURETTAGE AND DESTRUCTION
Detail Level: Detailed
Biopsy Photograph Reviewed: Yes
Number Of Curettages: 3
Size Of Lesion In Cm: 0.3
Size Of Lesion After Curettage: 1
Add Intralesional Injection: No
Anesthesia Type: 1% lidocaine with epinephrine and a 1:10 solution of 8.4% sodium bicarbonate
Cautery Type: electrocautery (heat cautery)
What Was Performed First?: Curettage
Final Size Statement: The size of the lesion after curettage was
Additional Information: (Optional): The wound was cleaned, and a bandaid was applied.  The patient received detailed post-op instructions.
Consent was obtained from the patient. The risks, benefits and alternatives to therapy were discussed in detail. Specifically, the risks of infection, scarring, bleeding, prolonged wound healing, nerve injury, incomplete removal, allergy to anesthesia and recurrence were addressed. Alternatives to ED&C, such as: surgical removal and XRT were also discussed.  Prior to the procedure, the treatment site was clearly identified and confirmed by the patient. All components of Universal Protocol/PAUSE Rule completed.
Post-Care Instructions: I reviewed with the patient in detail post-care instructions. Patient is to keep the area dry for 24 hours, and not to engage in any swimming until the area is healed. Should the patient develop any fevers, chills, bleeding, severe pain patient will contact the office immediately.
Bill As A Line Item Or As Units: Line Item
Billing Type: Third-Party Bill

## 2021-07-26 ENCOUNTER — TELEPHONE (OUTPATIENT)
Dept: CARDIOLOGY | Facility: MEDICAL CENTER | Age: 66
End: 2021-07-26

## 2021-07-26 ENCOUNTER — NON-PROVIDER VISIT (OUTPATIENT)
Dept: CARDIOLOGY | Facility: MEDICAL CENTER | Age: 66
End: 2021-07-26
Payer: MEDICARE

## 2021-07-26 DIAGNOSIS — R00.2 PALPITATIONS: ICD-10-CM

## 2021-07-26 DIAGNOSIS — I49.3 PVC (PREMATURE VENTRICULAR CONTRACTION): ICD-10-CM

## 2021-07-26 PROCEDURE — 93268 ECG RECORD/REVIEW: CPT | Performed by: INTERNAL MEDICINE

## 2021-07-26 NOTE — TELEPHONE ENCOUNTER
Patient enrolled in the 30 day [employer] MCT Heart monitoring program per Nimisha Rodriguez MD.(enrollment under today's ADD, Jb Cole MD)  >In office hookup with Baseline transmitted.  >Pending EOS.

## 2021-07-27 ENCOUNTER — OFFICE VISIT (OUTPATIENT)
Dept: INTERNAL MEDICINE | Facility: IMAGING CENTER | Age: 66
End: 2021-07-27
Payer: MEDICARE

## 2021-07-27 VITALS
OXYGEN SATURATION: 96 % | TEMPERATURE: 98 F | HEART RATE: 62 BPM | RESPIRATION RATE: 12 BRPM | SYSTOLIC BLOOD PRESSURE: 128 MMHG | DIASTOLIC BLOOD PRESSURE: 78 MMHG

## 2021-07-27 DIAGNOSIS — J01.00 ACUTE NON-RECURRENT MAXILLARY SINUSITIS: ICD-10-CM

## 2021-07-27 PROCEDURE — 99213 OFFICE O/P EST LOW 20 MIN: CPT | Performed by: FAMILY MEDICINE

## 2021-07-27 RX ORDER — AMOXICILLIN AND CLAVULANATE POTASSIUM 875; 125 MG/1; MG/1
1 TABLET, FILM COATED ORAL 2 TIMES DAILY
Qty: 20 TABLET | Refills: 0 | Status: SHIPPED
Start: 2021-07-27 | End: 2021-10-28

## 2021-07-27 NOTE — PROGRESS NOTES
No chief complaint on file.       HPI: Patient is a 65 y.o. male complaining of *10 days of illness including: rhinorrhea, sinus henry. tinnitus.   Mucus is: black/red.  Similarly ill exposures: yes.  Treatments tried: Flonase  He  reports that he has never smoked. He has never used smokeless tobacco..     ROS:  No fever, cough, nausea, changes in bowel movements or skin rash.       I reviewed the patient's medications, allergies and medical history:  Current Outpatient Medications   Medication Sig Dispense Refill   • amoxicillin-clavulanate (AUGMENTIN) 875-125 MG Tab Take 1 tablet by mouth 2 times a day. 20 tablet 0   • ALPRAZolam (XANAX) 0.25 MG Tab      • Testosterone 1.62 % Gel      • fluticasone (FLONASE) 50 MCG/ACT nasal spray Administer 1 Spray into affected nostril(S) every day.     • Doxepin HCl 3 MG Tab Take 3 mg by mouth at bedtime as needed.  May repeat 1 time.. 30 tablet 2   • amLODIPine (NORVASC) 10 MG Tab TAKE ONE TABLET BY MOUTH EVERY DAY 90 Tab 3   • losartan (COZAAR) 50 MG Tab TAKE ONE TABLET BY MOUTH EVERY DAY 90 Tab 3   • desonide (TRIDESILON) 0.05 % Cream      • tadalafil (CIALIS) 20 MG tablet Take 1 Tab by mouth as needed for Erectile Dysfunction (Max 1 dose per 24 hrs). 10 Tab 6   • Ascorbic Acid (VITAMIN C PO) Take  by mouth.     • Cholecalciferol (VITAMIN D) 2000 units Cap Take 2,000 Units by mouth every day.     • B Complex Vitamins (B COMPLEX 1 PO) Take 1 Tab by mouth every day.       No current facility-administered medications for this visit.     Patient has no known allergies.  Past Medical History:   Diagnosis Date   • Hypertension    • Insomnia    • Testosterone insufficiency         EXAM:  /78 (BP Location: Left arm, Patient Position: Sitting, BP Cuff Size: Adult)   Pulse 62   Temp 36.7 °C (98 °F) (Temporal)   Resp 12   SpO2 96%   General: Alert, no conversational dyspnea or audible wheeze, non-toxic appearance.  Eyes: PERRL, conjunctiva slightly injected, no eye  discharge.  Ears: Normal pinnae,TM's air/fluid interface bilaterally.  Nares: Patent       Throat:clear   Lungs: Clear to auscultation bilaterally, no wheeze, crackles or rhonchi.   Heart: Regular rate without murmur.  Skin: Warm and dry without rash.     ASSESSMENT:    Augmentin x 10 d.  rec saline rinsing      PLAN:  1. Educated patient that majority of upper respiratory infections are viral and do not need antibiotics. * As symptoms have been worsening over the last week, will treat with antibiotics.  2. Twice daily use of nasal saline rinse or Neti-Pot.  3. OTC anti-pyretics and decongestants as needed.  4. Follow-up in office or urgent care for worsening symptoms, difficulty breathing, lack of expected recovery, or should new symptoms or problems arise.

## 2021-08-05 DIAGNOSIS — R79.89 LOW TESTOSTERONE LEVEL IN MALE: ICD-10-CM

## 2021-08-06 RX ORDER — TESTOSTERONE 20.25 MG/1.25G
GEL TOPICAL
Qty: 75 G | Refills: 5 | Status: SHIPPED | OUTPATIENT
Start: 2021-08-06 | End: 2021-10-05

## 2021-08-31 DIAGNOSIS — I49.9 CARDIAC ARRHYTHMIA, UNSPECIFIED CARDIAC ARRHYTHMIA TYPE: ICD-10-CM

## 2021-08-31 DIAGNOSIS — H93.19 TINNITUS, UNSPECIFIED LATERALITY: ICD-10-CM

## 2021-09-03 ENCOUNTER — TELEPHONE (OUTPATIENT)
Dept: CARDIOLOGY | Facility: MEDICAL CENTER | Age: 66
End: 2021-09-03

## 2021-09-03 NOTE — TELEPHONE ENCOUNTER
Pt returning call and states that he has never seen a cardiologist before. No labs, EKG's or other cardiac testing done outside of [facility]. Please call pt back if you have any further questions at [phone].     Confirmed appt time and location with pt.       Thank you.

## 2021-09-03 NOTE — TELEPHONE ENCOUNTER
Called patient to request records for NP appointment with [facility]. Called to confirm if this will be first time patient is seeing a cardiologist. No answer, left voicemail to call back.    Pending call back.    Results of cardiac monitor are in pts chart for [facility] to review.

## 2021-09-08 ENCOUNTER — OFFICE VISIT (OUTPATIENT)
Dept: CARDIOLOGY | Facility: MEDICAL CENTER | Age: 66
End: 2021-09-08
Attending: FAMILY MEDICINE
Payer: MEDICARE

## 2021-09-08 VITALS
HEART RATE: 68 BPM | RESPIRATION RATE: 12 BRPM | DIASTOLIC BLOOD PRESSURE: 80 MMHG | HEIGHT: 73 IN | BODY MASS INDEX: 24.25 KG/M2 | OXYGEN SATURATION: 98 % | WEIGHT: 183 LBS | SYSTOLIC BLOOD PRESSURE: 128 MMHG

## 2021-09-08 DIAGNOSIS — I49.9 CARDIAC ARRHYTHMIA, UNSPECIFIED CARDIAC ARRHYTHMIA TYPE: ICD-10-CM

## 2021-09-08 DIAGNOSIS — R00.2 PALPITATIONS: ICD-10-CM

## 2021-09-08 DIAGNOSIS — I47.29 NSVT (NONSUSTAINED VENTRICULAR TACHYCARDIA) (HCC): ICD-10-CM

## 2021-09-08 DIAGNOSIS — I10 ESSENTIAL HYPERTENSION: Chronic | ICD-10-CM

## 2021-09-08 DIAGNOSIS — I49.3 PVC (PREMATURE VENTRICULAR CONTRACTION): ICD-10-CM

## 2021-09-08 PROBLEM — E78.2 MIXED DYSLIPIDEMIA: Status: RESOLVED | Noted: 2018-10-01 | Resolved: 2021-09-08

## 2021-09-08 LAB — EKG IMPRESSION: NORMAL

## 2021-09-08 PROCEDURE — 99204 OFFICE O/P NEW MOD 45 MIN: CPT | Performed by: INTERNAL MEDICINE

## 2021-09-08 PROCEDURE — 93000 ELECTROCARDIOGRAM COMPLETE: CPT | Performed by: INTERNAL MEDICINE

## 2021-09-08 ASSESSMENT — ENCOUNTER SYMPTOMS
PALPITATIONS: 0
WEIGHT LOSS: 0
GASTROINTESTINAL NEGATIVE: 1
EYES NEGATIVE: 1
NEUROLOGICAL NEGATIVE: 1
DOUBLE VISION: 0
DIZZINESS: 0
COUGH: 0
MUSCULOSKELETAL NEGATIVE: 1
NAUSEA: 0
VOMITING: 0
FEVER: 0
FOCAL WEAKNESS: 0
HEADACHES: 0
ABDOMINAL PAIN: 0
CHILLS: 0
CLAUDICATION: 0
MYALGIAS: 0
CONSTITUTIONAL NEGATIVE: 1
SHORTNESS OF BREATH: 0
BRUISES/BLEEDS EASILY: 0
DEPRESSION: 0
PSYCHIATRIC NEGATIVE: 1
BLURRED VISION: 0
CARDIOVASCULAR NEGATIVE: 1
WEAKNESS: 0
NERVOUS/ANXIOUS: 0
RESPIRATORY NEGATIVE: 1

## 2021-09-08 ASSESSMENT — FIBROSIS 4 INDEX: FIB4 SCORE: 1.13

## 2021-09-08 NOTE — PROGRESS NOTES
Chief Complaint   Patient presents with   • Arrhythmia     NP Dx: Cardiac arrhythmia       Subjective     Evan Wright is a 65 y.o. male who presents today as a consult from Nimisha Huerta for premature ventricular contractions.    Thank you for allowing me to evaluate Mr. Wright, who as you know is a 65 year old male with hypertension and no family history of coronary artery disease. He began to experience palpitations in 12/2019, descried to be fast racing heart associated with shortness of breath and diaphoresis while exercising. He denies associated chest pain, palpitations, nausea/vomiting. His symptoms have improved or aborted by increased inspiration. He recently underwent a BioTel which showed premature ventricular contractions and short nonsustained ventricular tachycardia described below.     Past Medical History:   Diagnosis Date   • Hypertension    • Insomnia    • Testosterone insufficiency      Past Surgical History:   Procedure Laterality Date   • FOOT SURGERY Left 2010    left foot neuroma     Family History   Problem Relation Age of Onset   • Hypertension Mother    • Cancer Father         liver CA   • Alcohol/Drug Father    • Hypertension Father    • No Known Problems Son    • No Known Problems Daughter    • Heart Disease Neg Hx      Social History     Socioeconomic History   • Marital status:      Spouse name: Not on file   • Number of children: 2   • Years of education: Not on file   • Highest education level: Not on file   Occupational History   • Occupation: Retired   Tobacco Use   • Smoking status: Never Smoker   • Smokeless tobacco: Never Used   Substance and Sexual Activity   • Alcohol use: Yes     Comment: wine daily 5-6   • Drug use: No   • Sexual activity: Yes     Partners: Female   Other Topics Concern   • Not on file   Social History Narrative    Retired business owner      2 grown kids     Social Determinants of Health     Financial Resource Strain:    • Difficulty of  Paying Living Expenses:    Food Insecurity:    • Worried About Running Out of Food in the Last Year:    • Ran Out of Food in the Last Year:    Transportation Needs: No Transportation Needs   • Lack of Transportation (Medical): No   • Lack of Transportation (Non-Medical): No   Physical Activity: Sufficiently Active   • Days of Exercise per Week: 7 days   • Minutes of Exercise per Session: 40 min   Stress:    • Feeling of Stress :    Social Connections:    • Frequency of Communication with Friends and Family:    • Frequency of Social Gatherings with Friends and Family:    • Attends Mosque Services:    • Active Member of Clubs or Organizations:    • Attends Club or Organization Meetings:    • Marital Status:    Intimate Partner Violence:    • Fear of Current or Ex-Partner:    • Emotionally Abused:    • Physically Abused:    • Sexually Abused:      No Known Allergies     (Medications reviewed.)  Outpatient Encounter Medications as of 9/8/2021   Medication Sig Dispense Refill   • Testosterone 1.62 % Gel APPLY 1 PUMP TOPICALLY DAILY AS DIRECTED FOR 60 DAYS 75 g 5   • amoxicillin-clavulanate (AUGMENTIN) 875-125 MG Tab Take 1 tablet by mouth 2 times a day. 20 tablet 0   • ALPRAZolam (XANAX) 0.25 MG Tab      • fluticasone (FLONASE) 50 MCG/ACT nasal spray Administer 1 Spray into affected nostril(S) every day.     • Doxepin HCl 3 MG Tab Take 3 mg by mouth at bedtime as needed.  May repeat 1 time.. 30 tablet 2   • amLODIPine (NORVASC) 10 MG Tab TAKE ONE TABLET BY MOUTH EVERY DAY 90 Tab 3   • losartan (COZAAR) 50 MG Tab TAKE ONE TABLET BY MOUTH EVERY DAY 90 Tab 3   • tadalafil (CIALIS) 20 MG tablet Take 1 Tab by mouth as needed for Erectile Dysfunction (Max 1 dose per 24 hrs). 10 Tab 6   • Ascorbic Acid (VITAMIN C PO) Take  by mouth.     • Cholecalciferol (VITAMIN D) 2000 units Cap Take 2,000 Units by mouth every day.     • B Complex Vitamins (B COMPLEX 1 PO) Take 1 Tab by mouth every day.     • desonide (TRIDESILON) 0.05 %  "Cream        No facility-administered encounter medications on file as of 9/8/2021.     Review of Systems   Constitutional: Negative.  Negative for chills, fever, malaise/fatigue and weight loss.   HENT: Negative.  Negative for hearing loss.    Eyes: Negative.  Negative for blurred vision and double vision.   Respiratory: Negative.  Negative for cough and shortness of breath.    Cardiovascular: Negative.  Negative for chest pain, palpitations, claudication and leg swelling.   Gastrointestinal: Negative.  Negative for abdominal pain, nausea and vomiting.   Genitourinary: Negative.  Negative for dysuria and urgency.   Musculoskeletal: Negative.  Negative for joint pain and myalgias.   Skin: Negative.  Negative for itching and rash.   Neurological: Negative.  Negative for dizziness, focal weakness, weakness and headaches.   Endo/Heme/Allergies: Negative.  Does not bruise/bleed easily.   Psychiatric/Behavioral: Negative.  Negative for depression. The patient is not nervous/anxious.               Objective     /80 (BP Location: Left arm, Patient Position: Sitting, BP Cuff Size: Adult)   Pulse 68   Resp 12   Ht 1.854 m (6' 1\")   Wt 83 kg (183 lb)   SpO2 98%   BMI 24.14 kg/m²     Physical Exam  Constitutional:       Appearance: He is well-developed.   HENT:      Head: Normocephalic and atraumatic.   Neck:      Vascular: No JVD.   Cardiovascular:      Rate and Rhythm: Normal rate and regular rhythm.      Heart sounds: Normal heart sounds.   Pulmonary:      Effort: Pulmonary effort is normal.      Breath sounds: Normal breath sounds.   Abdominal:      General: Bowel sounds are normal.      Palpations: Abdomen is soft.      Comments: No hepatosplenomegaly.   Musculoskeletal:         General: Normal range of motion.   Lymphadenopathy:      Cervical: No cervical adenopathy.   Skin:     General: Skin is warm and dry.   Neurological:      Mental Status: He is alert and oriented to person, place, and time.     CARDIAC " STUDIES/PROCEDURES:    BIOTEL CONCLUSIONS (07/26/21-08/24/21)   BioTel study showing predominately sinus rhythm with maximum rate of 133 bpm, minimum rate of 50 bpm, average of 62 bpm.   Atrial fibrillation: None.   Supraventricular tachycardia: None.   Pauses: None.   Heart block: None.   Ventricular tachycardia: 3 episodes with fastest run of 126 bpm of 4 beats and longest run on 18 beats.   1% burden of premature ventricular contractions and 1% burden of premature atrial contractions.   Symptoms associated with sinus tachycardia with premature ventricular contractions.     EKG was ordered for cardiac arrhythmias, premature ventricular contractions, performed on (09/08/21) was reviewed: EKG, personally interpreted shows sinus rhythm with left anterior fascicular block.    Laboratory results of (09/22/20) were reviewed. Cholesterol profile of 182/44/81/92 mg/dL noted.           Assessment & Plan     1. Cardiac arrhythmia, unspecified cardiac arrhythmia type  EKG   2. Palpitations     3. PVC (premature ventricular contraction)     4. NSVT (nonsustained ventricular tachycardia) ([facility])     5. Essential hypertension         Medical Decision Making: Today's Assessment/Status/Plan:        1. Palpitations:, premature ventricular contractions and nonsustained ventricular tachycardia He is experiencing palpitations as described above. He recently underwent a BioTel which showed premature ventricular contractions and short nonsustained ventricular tachycardia described below. Of note, he has previously tried beta blockade therapy which he could not tolerate due to side effects. We will perform an echocardiogram and stress myocardial perfusion imaging study.  2. Hypertension: Blood pressure is well controlled. We will continue with amlodipine and losartan.    We will follow up after his tests to reassess his symptoms.    Thank you for this consult.

## 2021-09-17 ENCOUNTER — HOSPITAL ENCOUNTER (OUTPATIENT)
Dept: RADIOLOGY | Facility: MEDICAL CENTER | Age: 66
End: 2021-09-17
Attending: INTERNAL MEDICINE
Payer: MEDICARE

## 2021-09-17 ENCOUNTER — TELEPHONE (OUTPATIENT)
Dept: CARDIOLOGY | Facility: MEDICAL CENTER | Age: 66
End: 2021-09-17

## 2021-09-17 DIAGNOSIS — I47.29 NSVT (NONSUSTAINED VENTRICULAR TACHYCARDIA) (HCC): ICD-10-CM

## 2021-09-17 DIAGNOSIS — R00.2 PALPITATIONS: ICD-10-CM

## 2021-09-17 DIAGNOSIS — I10 ESSENTIAL HYPERTENSION: Chronic | ICD-10-CM

## 2021-09-17 DIAGNOSIS — I49.9 CARDIAC ARRHYTHMIA, UNSPECIFIED CARDIAC ARRHYTHMIA TYPE: ICD-10-CM

## 2021-09-17 DIAGNOSIS — I49.3 PVC (PREMATURE VENTRICULAR CONTRACTION): ICD-10-CM

## 2021-09-17 PROCEDURE — 78452 HT MUSCLE IMAGE SPECT MULT: CPT | Mod: 26 | Performed by: INTERNAL MEDICINE

## 2021-09-17 PROCEDURE — A9502 TC99M TETROFOSMIN: HCPCS

## 2021-09-17 PROCEDURE — 93018 CV STRESS TEST I&R ONLY: CPT | Performed by: INTERNAL MEDICINE

## 2021-09-17 NOTE — PROCEDURES
Nuclear Treadmill Cardiac Stress test performed via Reggie protocol without complication. Baseline EKG shows sinus rhythm with rare PVC's. No significant changes or findings throughout exam. Hypertension present towards end of exercise.  Patient denies cardiac symptoms with exercise. VSS. Tolerates well.

## 2021-09-17 NOTE — TELEPHONE ENCOUNTER
----- Message from Jb Cole M.D. sent at 9/17/2021  3:46 PM PDT -----  Please call with unremarkable myocardial perfusion imaging study. Excellent exercise tolerance.    Thanks.  [facility]

## 2021-09-23 ENCOUNTER — NON-PROVIDER VISIT (OUTPATIENT)
Dept: INTERNAL MEDICINE | Facility: IMAGING CENTER | Age: 66
End: 2021-09-23
Payer: MEDICARE

## 2021-09-23 DIAGNOSIS — Z23 NEED FOR INFLUENZA VACCINATION: ICD-10-CM

## 2021-09-23 PROCEDURE — G0008 ADMIN INFLUENZA VIRUS VAC: HCPCS | Performed by: FAMILY MEDICINE

## 2021-09-23 PROCEDURE — 90662 IIV NO PRSV INCREASED AG IM: CPT | Performed by: FAMILY MEDICINE

## 2021-10-13 RX ORDER — CLOBETASOL PROPIONATE 0.5 MG/G
CREAM TOPICAL
Qty: 45 G | Refills: 2 | Status: SHIPPED | OUTPATIENT
Start: 2021-10-13

## 2021-10-25 ENCOUNTER — NON-PROVIDER VISIT (OUTPATIENT)
Dept: INTERNAL MEDICINE | Facility: IMAGING CENTER | Age: 66
End: 2021-10-25
Payer: MEDICARE

## 2021-10-25 ENCOUNTER — HOSPITAL ENCOUNTER (OUTPATIENT)
Facility: MEDICAL CENTER | Age: 66
End: 2021-10-25
Attending: FAMILY MEDICINE
Payer: MEDICARE

## 2021-10-25 DIAGNOSIS — E78.2 MIXED DYSLIPIDEMIA: ICD-10-CM

## 2021-10-25 DIAGNOSIS — Z12.5 PROSTATE CANCER SCREENING: ICD-10-CM

## 2021-10-25 DIAGNOSIS — R00.0 TACHYCARDIA: ICD-10-CM

## 2021-10-25 DIAGNOSIS — I10 ESSENTIAL HYPERTENSION: ICD-10-CM

## 2021-10-25 DIAGNOSIS — R79.89 LOW TESTOSTERONE LEVEL IN MALE: ICD-10-CM

## 2021-10-25 LAB
ALBUMIN SERPL BCP-MCNC: 5 G/DL (ref 3.2–4.9)
ALBUMIN/GLOB SERPL: 2.9 G/DL
ALP SERPL-CCNC: 54 U/L (ref 30–99)
ALT SERPL-CCNC: 16 U/L (ref 2–50)
ANION GAP SERPL CALC-SCNC: 11 MMOL/L (ref 7–16)
AST SERPL-CCNC: 16 U/L (ref 12–45)
BILIRUB SERPL-MCNC: 0.8 MG/DL (ref 0.1–1.5)
BUN SERPL-MCNC: 19 MG/DL (ref 8–22)
CALCIUM SERPL-MCNC: 10 MG/DL (ref 8.5–10.5)
CHLORIDE SERPL-SCNC: 107 MMOL/L (ref 96–112)
CHOLEST SERPL-MCNC: 185 MG/DL (ref 100–199)
CO2 SERPL-SCNC: 26 MMOL/L (ref 20–33)
CREAT SERPL-MCNC: 0.98 MG/DL (ref 0.5–1.4)
ERYTHROCYTE [DISTWIDTH] IN BLOOD BY AUTOMATED COUNT: 45.2 FL (ref 35.9–50)
GLOBULIN SER CALC-MCNC: 1.7 G/DL (ref 1.9–3.5)
GLUCOSE SERPL-MCNC: 83 MG/DL (ref 65–99)
HCT VFR BLD AUTO: 48.9 % (ref 42–52)
HDLC SERPL-MCNC: 85 MG/DL
HGB BLD-MCNC: 16.7 G/DL (ref 14–18)
LDLC SERPL CALC-MCNC: 93 MG/DL
MCH RBC QN AUTO: 32.6 PG (ref 27–33)
MCHC RBC AUTO-ENTMCNC: 34.2 G/DL (ref 33.7–35.3)
MCV RBC AUTO: 95.3 FL (ref 81.4–97.8)
PLATELET # BLD AUTO: 252 K/UL (ref 164–446)
PMV BLD AUTO: 10 FL (ref 9–12.9)
POTASSIUM SERPL-SCNC: 5.3 MMOL/L (ref 3.6–5.5)
PROT SERPL-MCNC: 6.7 G/DL (ref 6–8.2)
PSA SERPL-MCNC: 1.32 NG/ML (ref 0–4)
RBC # BLD AUTO: 5.13 M/UL (ref 4.7–6.1)
SODIUM SERPL-SCNC: 144 MMOL/L (ref 135–145)
TRIGL SERPL-MCNC: 37 MG/DL (ref 0–149)
TSH SERPL DL<=0.005 MIU/L-ACNC: 3.25 UIU/ML (ref 0.38–5.33)
WBC # BLD AUTO: 4.8 K/UL (ref 4.8–10.8)

## 2021-10-25 PROCEDURE — 80061 LIPID PANEL: CPT

## 2021-10-25 PROCEDURE — 84443 ASSAY THYROID STIM HORMONE: CPT

## 2021-10-25 PROCEDURE — 80053 COMPREHEN METABOLIC PANEL: CPT

## 2021-10-25 PROCEDURE — 84270 ASSAY OF SEX HORMONE GLOBUL: CPT

## 2021-10-25 PROCEDURE — 84153 ASSAY OF PSA TOTAL: CPT | Mod: GA

## 2021-10-25 PROCEDURE — 84402 ASSAY OF FREE TESTOSTERONE: CPT

## 2021-10-25 PROCEDURE — 85027 COMPLETE CBC AUTOMATED: CPT

## 2021-10-25 PROCEDURE — 84403 ASSAY OF TOTAL TESTOSTERONE: CPT

## 2021-10-27 LAB
SHBG SERPL-SCNC: 37 NMOL/L (ref 11–80)
TESTOST FREE MFR SERPL: 1.8 % (ref 1.6–2.9)
TESTOST FREE SERPL-MCNC: 102 PG/ML (ref 47–244)
TESTOST SERPL-MCNC: 565 NG/DL (ref 300–720)

## 2021-10-28 ENCOUNTER — OFFICE VISIT (OUTPATIENT)
Dept: INTERNAL MEDICINE | Facility: IMAGING CENTER | Age: 66
End: 2021-10-28
Payer: MEDICARE

## 2021-10-28 VITALS
RESPIRATION RATE: 17 BRPM | OXYGEN SATURATION: 96 % | HEIGHT: 73 IN | WEIGHT: 184 LBS | HEART RATE: 55 BPM | BODY MASS INDEX: 24.39 KG/M2 | SYSTOLIC BLOOD PRESSURE: 118 MMHG | TEMPERATURE: 97.9 F | DIASTOLIC BLOOD PRESSURE: 64 MMHG

## 2021-10-28 DIAGNOSIS — Z00.00 MEDICARE ANNUAL WELLNESS VISIT, SUBSEQUENT: Primary | ICD-10-CM

## 2021-10-28 PROBLEM — H69.93 DYSFUNCTION OF BOTH EUSTACHIAN TUBES: Status: ACTIVE | Noted: 2021-10-28

## 2021-10-28 PROBLEM — H91.90 HEARING LOSS: Status: ACTIVE | Noted: 2021-10-28

## 2021-10-28 PROBLEM — H93.13 BILATERAL TINNITUS: Status: ACTIVE | Noted: 2021-10-28

## 2021-10-28 PROBLEM — H90.3 SENSORINEURAL HEARING LOSS, BILATERAL: Status: ACTIVE | Noted: 2021-10-28

## 2021-10-28 PROCEDURE — G0439 PPPS, SUBSEQ VISIT: HCPCS | Performed by: FAMILY MEDICINE

## 2021-10-28 RX ORDER — TESTOSTERONE 20.25 MG/1.25G
GEL TOPICAL
COMMUNITY
Start: 2021-10-16 | End: 2022-03-03

## 2021-10-28 ASSESSMENT — FIBROSIS 4 INDEX: FIB4 SCORE: 1.06

## 2021-10-28 ASSESSMENT — PATIENT HEALTH QUESTIONNAIRE - PHQ9: CLINICAL INTERPRETATION OF PHQ2 SCORE: 0

## 2021-10-28 ASSESSMENT — ENCOUNTER SYMPTOMS: GENERAL WELL-BEING: EXCELLENT

## 2021-10-28 ASSESSMENT — ACTIVITIES OF DAILY LIVING (ADL): BATHING_REQUIRES_ASSISTANCE: 0

## 2021-10-28 NOTE — PROGRESS NOTES
Chief Complaint   Patient presents with   • Annual Exam     Review labs         HPI:  Evan Wright is a 65 y.o. here for Medicare Annual Wellness Visit     Patient reports he is feeling well he is using meditation to help him sleep and he has stopped using the Xanax  And doxepin.  He did not like the way the doxepin made him feel     Otherwise he has no complaints, he has healthier habits since retiring earlier this year    He is having cardiac testing for complaints of tachycardia, monitor test and nuclear stress test are normal  He has seen ENT for tinnitus, felt to be due to allergies    Patient Active Problem List    Diagnosis Date Noted   • Sensorineural hearing loss, bilateral 10/28/2021   • Hearing loss 10/28/2021   • Dysfunction of both eustachian tubes 10/28/2021   • Bilateral tinnitus 10/28/2021   • Palpitations 09/08/2021   • PVC (premature ventricular contraction) 09/08/2021   • NSVT (nonsustained ventricular tachycardia) (HCC) 09/08/2021   • Tachycardia 06/13/2021   • Other male erectile dysfunction 10/01/2018   • History of squamous cell carcinoma of skin 03/24/2018   • Primary insomnia 03/23/2018   • Low testosterone level in male 06/13/2017   • Essential hypertension 06/13/2017   • Tubular adenoma of colon 01/01/2014       Current Outpatient Medications   Medication Sig Dispense Refill   • clobetasol (TEMOVATE) 0.05 % Cream Apply topically twice a day 45 g 2   • ALPRAZolam (XANAX) 0.25 MG Tab      • fluticasone (FLONASE) 50 MCG/ACT nasal spray Administer 1 Spray into affected nostril(S) every day.     • Doxepin HCl 3 MG Tab Take 3 mg by mouth at bedtime as needed.  May repeat 1 time.. 30 tablet 2   • amLODIPine (NORVASC) 10 MG Tab TAKE ONE TABLET BY MOUTH EVERY DAY 90 Tab 3   • losartan (COZAAR) 50 MG Tab TAKE ONE TABLET BY MOUTH EVERY DAY 90 Tab 3   • desonide (TRIDESILON) 0.05 % Cream      • tadalafil (CIALIS) 20 MG tablet Take 1 Tab by mouth as needed for Erectile Dysfunction (Max 1 dose per 24  hrs). 10 Tab 6   • Ascorbic Acid (VITAMIN C PO) Take  by mouth.     • Cholecalciferol (VITAMIN D) 2000 units Cap Take 2,000 Units by mouth every day.     • B Complex Vitamins (B COMPLEX 1 PO) Take 1 Tab by mouth every day.     • Testosterone 1.62 % Gel        No current facility-administered medications for this visit.            Current supplements as per medication list.       Allergies: Patient has no known allergies.    Current social contact/activities: *Friends and family    He  reports that he has never smoked. He has never used smokeless tobacco. He reports current alcohol use. He reports that he does not use drugs.  Counseling given: Not Answered        DPA/Advanced Directive:  Patient has no  Advanced Directive on file.       ROS:    Gait: Uses no assistive device   Ostomy: no   Other tubes: no   Amputations: no   Chronic oxygen use: no   Last eye exam: Up-to-date  : Denies any urinary leakage during the last 6 months incontinence.       Screening:     Depression Screening    Little interest or pleasure in doing things?  0 - not at all  Feeling down, depressed , or hopeless? 0 - not at all  Patient Health Questionnaire Score: 0     If depressive symptoms identified deferred to follow up visit unless specifically addressed in assessment and plan.    Interpretation of PHQ-9 Total Score   Score Severity   1-4 No Depression   5-9 Mild Depression   10-14 Moderate Depression   15-19 Moderately Severe Depression   20-27 Severe Depression    Screening for Cognitive Impairment    Three Minute Recall (captain, garden, picture) 2/3    Larry clock face with all 12 numbers and set the hands to show 5 past 8.  No    Cognitive concerns identified deferred for follow up unless specifically addressed in assessment and plan.    Fall Risk Assessment    Has the patient had two or more falls in the last year or any fall with injury in the last year?  No    Safety Assessment    Throw rugs on floor.  No  Handrails on all  stairs.  Yes  Good lighting in all hallways.  Yes  Difficulty hearing.  No  Patient counseled about all safety risks that were identified.    Functional Assessment ADLs    Are there any barriers preventing you from cooking for yourself or meeting nutritional needs?  No.    Are there any barriers preventing you from driving safely or obtaining transportation?  No.    Are there any barriers preventing you from using a telephone or calling for help?  No.    Are there any barriers preventing you from shopping?  No.    Are there any barriers preventing you from taking care of your own finances?  No.    Are there any barriers preventing you from managing your medications?  No.    Are there any barriers preventing you from showering, bathing or dressing yourself?  No.    Are you currently engaging in any exercise or physical activity?  Yes.     What is your perception of your health?  Excellent.      Health Maintenance Summary                IMM ZOSTER VACCINES Postponed 9/1/2022 Originally 10/27/2017. Provider advises postponing for medical reasons     Done 9/1/2017 Imm Admin: Zoster Vaccine Live (ZVL) (Zostavax) - HISTORICAL DATA    Annual Wellness Visit Next Due 10/29/2022      Done 10/28/2021 Visit Dx: Medicare annual wellness visit, subsequent     Patient has more history with this topic...    IMM DTaP/Tdap/Td Vaccine Next Due 7/23/2024      Done 7/23/2014 Imm Admin: Tdap Vaccine    COLORECTAL CANCER SCREENING Next Due 12/18/2024           Patient Care Team:  Nimisha Rodriguez M.D. as PCP - General (Family Medicine)  Nicole Phan R.N. as Registered Nurse        Social History     Tobacco Use   • Smoking status: Never Smoker   • Smokeless tobacco: Never Used   Substance Use Topics   • Alcohol use: Yes     Comment: wine daily 5-6   • Drug use: No     Family History   Problem Relation Age of Onset   • Hypertension Mother    • Cancer Father         liver CA   • Alcohol/Drug Father    • Hypertension Father    • No  "Known Problems Son    • No Known Problems Daughter    • Heart Disease Neg Hx      He  has a past medical history of Hypertension, Insomnia, and Testosterone insufficiency.   Past Surgical History:   Procedure Laterality Date   • FOOT SURGERY Left 2010    left foot neuroma       Exam:     /64 (BP Location: Left arm, Patient Position: Sitting, BP Cuff Size: Adult)   Pulse (!) 55   Temp 36.6 °C (97.9 °F) (Temporal)   Resp 17   Ht 1.854 m (6' 1\")   Wt 83.5 kg (184 lb)   SpO2 96%  Body mass index is 24.28 kg/m².    Hearing good.    Dentition good  Alert, oriented in no acute distress.  Eye contact is good, speech goal directed, affect calm  Physical Exam  Constitutional:       General: He is not in acute distress.     Appearance: Normal appearance.   HENT:      Head: Normocephalic and atraumatic.      Nose: Nose normal.      Mouth/Throat:      Mouth: Mucous membranes are moist.   Eyes:      Conjunctiva/sclera: Conjunctivae normal.   Cardiovascular:      Rate and Rhythm: Normal rate and regular rhythm.   Pulmonary:      Effort: Pulmonary effort is normal.      Breath sounds: Normal breath sounds. No wheezing.   Abdominal:      General: Abdomen is flat.      Palpations: Abdomen is soft.   Musculoskeletal:      Cervical back: No rigidity.      Right lower leg: No edema.      Left lower leg: No edema.   Lymphadenopathy:      Cervical: No cervical adenopathy.   Skin:     Coloration: Skin is not jaundiced.      Findings: No erythema.   Neurological:      General: No focal deficit present.      Mental Status: He is alert and oriented to person, place, and time. Mental status is at baseline.   Psychiatric:         Mood and Affect: Mood normal.         Behavior: Behavior normal.         Thought Content: Thought content normal.         Judgment: Judgment normal.         Assessment and Plan. The following treatment and monitoring plan is recommended:     No diagnosis found.      Services suggested: No services needed at " this time  Health Care Screening: Age-appropriate preventive services Medicare covers discussed today and ordered if indicated.  Referrals offered: Community-based lifestyle interventions to reduce health risks and promote self-management and wellness, fall prevention, nutrition, physical activity, tobacco-use cessation, weight loss, and mental health services as per orders if indicated.    Discussion today about general wellness and lifestyle habits:    · Prevent falls and reduce trip hazards; Cautioned about securing or removing rugs.  · Have a working fire alarm and carbon monoxide detector;   · Engage in regular physical activity and social activities       Follow-up: Annually and as needed

## 2021-10-28 NOTE — PATIENT INSTRUCTIONS
Advance Directive    Advance directives are legal documents that let you make choices ahead of time about your health care and medical treatment in case you become unable to communicate for yourself. Advance directives are a way for you to communicate your wishes to family, friends, and health care providers. This can help convey your decisions about end-of-life care if you become unable to communicate.  Discussing and writing advance directives should happen over time rather than all at once. Advance directives can be changed depending on your situation and what you want, even after you have signed the advance directives.  If you do not have an advance directive, some states assign family decision makers to act on your behalf based on how closely you are related to them. Each state has its own laws regarding advance directives. You may want to check with your health care provider, , or state representative about the laws in your state. There are different types of advance directives, such as:  · Medical power of .  · Living will.  · Do not resuscitate (DNR) or do not attempt resuscitation (DNAR) order.  Health care proxy and medical power of   A health care proxy, also called a health care agent, is a person who is appointed to make medical decisions for you in cases in which you are unable to make the decisions yourself. Generally, people choose someone they know well and trust to represent their preferences. Make sure to ask this person for an agreement to act as your proxy. A proxy may have to exercise judgment in the event of a medical decision for which your wishes are not known.  A medical power of  is a legal document that names your health care proxy. Depending on the laws in your state, after the document is written, it may also need to be:  · Signed.  · Notarized.  · Dated.  · Copied.  · Witnessed.  · Incorporated into your medical record.  You may also want to appoint  someone to manage your financial affairs in a situation in which you are unable to do so. This is called a durable power of  for finances. It is a separate legal document from the durable power of  for health care. You may choose the same person or someone different from your health care proxy to act as your agent in financial matters.  If you do not appoint a proxy, or if there is a concern that the proxy is not acting in your best interests, a court-appointed guardian may be designated to act on your behalf.  Living will  A living will is a set of instructions documenting your wishes about medical care when you cannot express them yourself. Health care providers should keep a copy of your living will in your medical record. You may want to give a copy to family members or friends. To alert caregivers in case of an emergency, you can place a card in your wallet to let them know that you have a living will and where they can find it. A living will is used if you become:  · Terminally ill.  · Incapacitated.  · Unable to communicate or make decisions.  Items to consider in your living will include:  · The use or non-use of life-sustaining equipment, such as dialysis machines and breathing machines (ventilators).  · A DNR or DNAR order, which is the instruction not to use cardiopulmonary resuscitation (CPR) if breathing or heartbeat stops.  · The use or non-use of tube feeding.  · Withholding of food and fluids.  · Comfort (palliative) care when the goal becomes comfort rather than a cure.  · Organ and tissue donation.  A living will does not give instructions for distributing your money and property if you should pass away. It is recommended that you seek the advice of a  when writing a will. Decisions about taxes, beneficiaries, and asset distribution will be legally binding. This process can relieve your family and friends of any concerns surrounding disputes or questions that may come up about  the distribution of your assets.  DNR or DNAR  A DNR or DNAR order is a request not to have CPR in the event that your heart stops beating or you stop breathing. If a DNR or DNAR order has not been made and shared, a health care provider will try to help any patient whose heart has stopped or who has stopped breathing. If you plan to have surgery, talk with your health care provider about how your DNR or DNAR order will be followed if problems occur.  Summary  · Advance directives are the legal documents that allow you to make choices ahead of time about your health care and medical treatment in case you become unable to communicate for yourself.  · The process of discussing and writing advance directives should happen over time. You can change the advance directives, even after you have signed them.  · Advance directives include DNR or DNAR orders, living clark, and designating an agent as your medical power of .  This information is not intended to replace advice given to you by your health care provider. Make sure you discuss any questions you have with your health care provider.  Document Released: 03/26/2009 Document Revised: 01/22/2020 Document Reviewed: 11/06/2017  [employer] Patient Education © 2020 [employer]

## 2021-11-08 ENCOUNTER — APPOINTMENT (RX ONLY)
Dept: URBAN - METROPOLITAN AREA CLINIC 35 | Facility: CLINIC | Age: 66
Setting detail: DERMATOLOGY
End: 2021-11-08

## 2021-11-08 DIAGNOSIS — L72.8 OTHER FOLLICULAR CYSTS OF THE SKIN AND SUBCUTANEOUS TISSUE: ICD-10-CM

## 2021-11-08 PROBLEM — D48.5 NEOPLASM OF UNCERTAIN BEHAVIOR OF SKIN: Status: ACTIVE | Noted: 2021-11-08

## 2021-11-08 PROCEDURE — 11403 EXC TR-EXT B9+MARG 2.1-3CM: CPT

## 2021-11-08 PROCEDURE — ? COUNSELING

## 2021-11-08 PROCEDURE — ? MEDICATION COUNSELING

## 2021-11-08 PROCEDURE — ? PRESCRIPTION

## 2021-11-08 PROCEDURE — 12031 INTMD RPR S/A/T/EXT 2.5 CM/<: CPT

## 2021-11-08 PROCEDURE — ? EXCISION

## 2021-11-08 RX ORDER — CEPHALEXIN 500 MG/1
1 CAPSULE ORAL QID
Qty: 28 | Refills: 0 | Status: ERX | COMMUNITY
Start: 2021-11-08 | End: 2022-06-21

## 2021-11-08 RX ADMIN — CEPHALEXIN 1: 500 CAPSULE ORAL at 00:00

## 2021-11-08 ASSESSMENT — LOCATION ZONE DERM: LOCATION ZONE: TRUNK

## 2021-11-08 ASSESSMENT — LOCATION SIMPLE DESCRIPTION DERM: LOCATION SIMPLE: UPPER BACK

## 2021-11-08 ASSESSMENT — LOCATION DETAILED DESCRIPTION DERM: LOCATION DETAILED: SUPERIOR THORACIC SPINE

## 2021-11-08 NOTE — PROCEDURE: MEDICATION COUNSELING
Bexarotene Pregnancy And Lactation Text: This medication is Pregnancy Category X and should not be given to women who are pregnant or may become pregnant. This medication should not be used if you are breast feeding.
Wartpeel Counseling:  I discussed with the patient the risks of Wartpeel including but not limited to erythema, scaling, itching, weeping, crusting, and pain.
Niacinamide Pregnancy And Lactation Text: These medications are considered safe during pregnancy.
Doxycycline Pregnancy And Lactation Text: This medication is Pregnancy Category D and not consider safe during pregnancy. It is also excreted in breast milk but is considered safe for shorter treatment courses.
Azathioprine Pregnancy And Lactation Text: This medication is Pregnancy Category D and isn't considered safe during pregnancy. It is unknown if this medication is excreted in breast milk.
Drysol Pregnancy And Lactation Text: This medication is considered safe during pregnancy and breast feeding.
Dapsone Pregnancy And Lactation Text: This medication is Pregnancy Category C and is not considered safe during pregnancy or breast feeding.
Cellcept Counseling:  I discussed with the patient the risks of mycophenolate mofetil including but not limited to infection/immunosuppression, GI upset, hypokalemia, hypercholesterolemia, bone marrow suppression, lymphoproliferative disorders, malignancy, GI ulceration/bleed/perforation, colitis, interstitial lung disease, kidney failure, progressive multifocal leukoencephalopathy, and birth defects.  The patient understands that monitoring is required including a baseline creatinine and regular CBC testing. In addition, patient must alert us immediately if symptoms of infection or other concerning signs are noted.
Birth Control Pills Pregnancy And Lactation Text: This medication should be avoided if pregnant and for the first 30 days post-partum.
Tetracycline Pregnancy And Lactation Text: This medication is Pregnancy Category D and not consider safe during pregnancy. It is also excreted in breast milk.
Doxepin Counseling:  Patient advised that the medication is sedating and not to drive a car after taking this medication. Patient informed of potential adverse effects including but not limited to dry mouth, urinary retention, and blurry vision.  The patient verbalized understanding of the proper use and possible adverse effects of doxepin.  All of the patient's questions and concerns were addressed.
Picato Pregnancy And Lactation Text: This medication is Pregnancy Category C. It is unknown if this medication is excreted in breast milk.
Isotretinoin Counseling: Patient should get monthly blood tests, not donate blood, not drive at night if vision affected, not share medication, and not undergo elective surgery for 6 months after tx completed. Side effects reviewed, pt to contact office should one occur.
Erythromycin Counseling:  I discussed with the patient the risks of erythromycin including but not limited to GI upset, allergic reaction, drug rash, diarrhea, increase in liver enzymes, and yeast infections.
Wartpeel Pregnancy And Lactation Text: This medication is Pregnancy Category X and contraindicated in pregnancy and in women who may become pregnant. It is unknown if this medication is excreted in breast milk.
Skyrizi Pregnancy And Lactation Text: The risk during pregnancy and breastfeeding is uncertain with this medication.
Enbrel Pregnancy And Lactation Text: This medication is Pregnancy Category B and is considered safe during pregnancy. It is unknown if this medication is excreted in breast milk.
Doxepin Pregnancy And Lactation Text: This medication is Pregnancy Category C and it isn't known if it is safe during pregnancy. It is also excreted in breast milk and breast feeding isn't recommended.
Erivedge Counseling- I discussed with the patient the risks of Erivedge including but not limited to nausea, vomiting, diarrhea, constipation, weight loss, changes in the sense of taste, decreased appetite, muscle spasms, and hair loss.  The patient verbalized understanding of the proper use and possible adverse effects of Erivedge.  All of the patient's questions and concerns were addressed.
Spironolactone Counseling: Patient advised regarding risks of diarrhea, abdominal pain, hyperkalemia, birth defects (for female patients), liver toxicity and renal toxicity. The patient may need blood work to monitor liver and kidney function and potassium levels while on therapy. The patient verbalized understanding of the proper use and possible adverse effects of spironolactone.  All of the patient's questions and concerns were addressed.
Protopic Counseling: Patient may experience a mild burning sensation during topical application. Protopic is not approved in children less than 2 years of age. There have been case reports of hematologic and skin malignancies in patients using topical calcineurin inhibitors although causality is questionable.
Humira Counseling:  I discussed with the patient the risks of adalimumab including but not limited to myelosuppression, immunosuppression, autoimmune hepatitis, demyelinating diseases, lymphoma, and serious infections.  The patient understands that monitoring is required including a PPD at baseline and must alert us or the primary physician if symptoms of infection or other concerning signs are noted.
Erythromycin Pregnancy And Lactation Text: This medication is Pregnancy Category B and is considered safe during pregnancy. It is also excreted in breast milk.
Elidel Counseling: Patient may experience a mild burning sensation during topical application. Elidel is not approved in children less than 2 years of age. There have been case reports of hematologic and skin malignancies in patients using topical calcineurin inhibitors although causality is questionable.
Stelara Counseling:  I discussed with the patient the risks of ustekinumab including but not limited to immunosuppression, malignancy, posterior leukoencephalopathy syndrome, and serious infections.  The patient understands that monitoring is required including a PPD at baseline and must alert us or the primary physician if symptoms of infection or other concerning signs are noted.
Spironolactone Pregnancy And Lactation Text: This medication can cause feminization of the male fetus and should be avoided during pregnancy. The active metabolite is also found in breast milk.
Erivedge Pregnancy And Lactation Text: This medication is Pregnancy Category X and is absolutely contraindicated during pregnancy. It is unknown if it is excreted in breast milk.
Fluconazole Counseling:  Patient counseled regarding adverse effects of fluconazole including but not limited to headache, diarrhea, nausea, upset stomach, liver function test abnormalities, taste disturbance, and stomach pain.  There is a rare possibility of liver failure that can occur when taking fluconazole.  The patient understands that monitoring of LFTs and kidney function test may be required, especially at baseline. The patient verbalized understanding of the proper use and possible adverse effects of fluconazole.  All of the patient's questions and concerns were addressed.
Isotretinoin Pregnancy And Lactation Text: This medication is Pregnancy Category X and is considered extremely dangerous during pregnancy. It is unknown if it is excreted in breast milk.
Protopic Pregnancy And Lactation Text: This medication is Pregnancy Category C. It is unknown if this medication is excreted in breast milk when applied topically.
Zyclara Counseling:  I discussed with the patient the risks of imiquimod including but not limited to erythema, scaling, itching, weeping, crusting, and pain.  Patient understands that the inflammatory response to imiquimod is variable from person to person and was educated regarded proper titration schedule.  If flu-like symptoms develop, patient knows to discontinue the medication and contact us.
Metronidazole Counseling:  I discussed with the patient the risks of metronidazole including but not limited to seizures, nausea/vomiting, a metallic taste in the mouth, nausea/vomiting and severe allergy.
Cyclophosphamide Counseling:  I discussed with the patient the risks of cyclophosphamide including but not limited to hair loss, hormonal abnormalities, decreased fertility, abdominal pain, diarrhea, nausea and vomiting, bone marrow suppression and infection. The patient understands that monitoring is required while taking this medication.
Hydroxyzine Counseling: Patient advised that the medication is sedating and not to drive a car after taking this medication.  Patient informed of potential adverse effects including but not limited to dry mouth, urinary retention, and blurry vision.  The patient verbalized understanding of the proper use and possible adverse effects of hydroxyzine.  All of the patient's questions and concerns were addressed.
Hydroxyzine Pregnancy And Lactation Text: This medication is not safe during pregnancy and should not be taken. It is also excreted in breast milk and breast feeding isn't recommended.
Rhofade Counseling: Rhofade is a topical medication which can decrease superficial blood flow where applied. Side effects are uncommon and include stinging, redness and allergic reactions.
Finasteride Male Counseling: Finasteride Counseling:  I discussed with the patient the risks of use of finasteride including but not limited to decreased libido, decreased ejaculate volume, gynecomastia, and depression. Women should not handle medication.  All of the patient's questions and concerns were addressed.
SSKI Counseling:  I discussed with the patient the risks of SSKI including but not limited to thyroid abnormalities, metallic taste, GI upset, fever, headache, acne, arthralgias, paraesthesias, lymphadenopathy, easy bleeding, arrhythmias, and allergic reaction.
High Dose Vitamin A Counseling: Side effects reviewed, pt to contact office should one occur.
Taltz Counseling: I discussed with the patient the risks of ixekizumab including but not limited to immunosuppression, serious infections, worsening of inflammatory bowel disease and drug reactions.  The patient understands that monitoring is required including a PPD at baseline and must alert us or the primary physician if symptoms of infection or other concerning signs are noted.
Ilumya Counseling: I discussed with the patient the risks of tildrakizumab including but not limited to immunosuppression, malignancy, posterior leukoencephalopathy syndrome, and serious infections.  The patient understands that monitoring is required including a PPD at baseline and must alert us or the primary physician if symptoms of infection or other concerning signs are noted.
Eucrisa Counseling: Patient may experience a mild burning sensation during topical application. Eucrisa is not approved in children less than 2 years of age.
Cyclophosphamide Pregnancy And Lactation Text: This medication is Pregnancy Category D and it isn't considered safe during pregnancy. This medication is excreted in breast milk.
Fluconazole Pregnancy And Lactation Text: This medication is Pregnancy Category C and it isn't know if it is safe during pregnancy. It is also excreted in breast milk.
Rhofade Pregnancy And Lactation Text: This medication has not been assigned a Pregnancy Risk Category. It is unknown if the medication is excreted in breast milk.
Sski Pregnancy And Lactation Text: This medication is Pregnancy Category D and isn't considered safe during pregnancy. It is excreted in breast milk.
High Dose Vitamin A Pregnancy And Lactation Text: High dose vitamin A therapy is contraindicated during pregnancy and breast feeding.
Eucrisa Pregnancy And Lactation Text: This medication has not been assigned a Pregnancy Risk Category but animal studies failed to show danger with the topical medication. It is unknown if the medication is excreted in breast milk.
Cyclosporine Counseling:  I discussed with the patient the risks of cyclosporine including but not limited to hypertension, gingival hyperplasia,myelosuppression, immunosuppression, liver damage, kidney damage, neurotoxicity, lymphoma, and serious infections. The patient understands that monitoring is required including baseline blood pressure, CBC, CMP, lipid panel and uric acid, and then 1-2 times monthly CMP and blood pressure.
Griseofulvin Counseling:  I discussed with the patient the risks of griseofulvin including but not limited to photosensitivity, cytopenia, liver damage, nausea/vomiting and severe allergy.  The patient understands that this medication is best absorbed when taken with a fatty meal (e.g., ice cream or french fries).
Nsaids Counseling: NSAID Counseling: I discussed with the patient that NSAIDs should be taken with food. Prolonged use of NSAIDs can result in the development of stomach ulcers.  Patient advised to stop taking NSAIDs if abdominal pain occurs.  The patient verbalized understanding of the proper use and possible adverse effects of NSAIDs.  All of the patient's questions and concerns were addressed.
Finasteride Pregnancy And Lactation Text: This medication is absolutely contraindicated during pregnancy. It is unknown if it is excreted in breast milk.
Solaraze Counseling:  I discussed with the patient the risks of Solaraze including but not limited to erythema, scaling, itching, weeping, crusting, and pain.
Gabapentin Counseling: I discussed with the patient the risks of gabapentin including but not limited to dizziness, somnolence, fatigue and ataxia.
Opioid Counseling: I discussed with the patient the potential side effects of opioids including but not limited to addiction, altered mental status, and depression. I stressed avoiding alcohol, benzodiazepines, muscle relaxants and sleep aids unless specifically okayed by a physician. The patient verbalized understanding of the proper use and possible adverse effects of opioids. All of the patient's questions and concerns were addressed. They were instructed to flush the remaining pills down the toilet if they did not need them for pain.
Ivermectin Pregnancy And Lactation Text: This medication is Pregnancy Category C and it isn't known if it is safe during pregnancy. It is also excreted in breast milk.
Griseofulvin Pregnancy And Lactation Text: This medication is Pregnancy Category X and is known to cause serious birth defects. It is unknown if this medication is excreted in breast milk but breast feeding should be avoided.
Albendazole Counseling:  I discussed with the patient the risks of albendazole including but not limited to cytopenia, kidney damage, nausea/vomiting and severe allergy.  The patient understands that this medication is being used in an off-label manner.
Hydroquinone Counseling:  Patient advised that medication may result in skin irritation, lightening (hypopigmentation), dryness, and burning.  In the event of skin irritation, the patient was advised to reduce the amount of the drug applied or use it less frequently.  Rarely, spots that are treated with hydroquinone can become darker (pseudoochronosis).  Should this occur, patient instructed to stop medication and call the office. The patient verbalized understanding of the proper use and possible adverse effects of hydroquinone.  All of the patient's questions and concerns were addressed.
Azithromycin Counseling:  I discussed with the patient the risks of azithromycin including but not limited to GI upset, allergic reaction, drug rash, diarrhea, and yeast infections.
Infliximab Counseling:  I discussed with the patient the risks of infliximab including but not limited to myelosuppression, immunosuppression, autoimmune hepatitis, demyelinating diseases, lymphoma, and serious infections.  The patient understands that monitoring is required including a PPD at baseline and must alert us or the primary physician if symptoms of infection or other concerning signs are noted.
Thalidomide Counseling: I discussed with the patient the risks of thalidomide including but not limited to birth defects, anxiety, weakness, chest pain, dizziness, cough and severe allergy.
Nsaids Pregnancy And Lactation Text: These medications are considered safe up to 30 weeks gestation. It is excreted in breast milk.
Solaraze Pregnancy And Lactation Text: This medication is Pregnancy Category B and is considered safe. There is some data to suggest avoiding during the third trimester. It is unknown if this medication is excreted in breast milk.
Opioid Pregnancy And Lactation Text: These medications can lead to premature delivery and should be avoided during pregnancy. These medications are also present in breast milk in small amounts.
Benzoyl Peroxide Counseling: Patient counseled that medicine may cause skin irritation and bleach clothing.  In the event of skin irritation, the patient was advised to reduce the amount of the drug applied or use it less frequently.   The patient verbalized understanding of the proper use and possible adverse effects of benzoyl peroxide.  All of the patient's questions and concerns were addressed.
Tremfya Counseling: I discussed with the patient the risks of guselkumab including but not limited to immunosuppression, serious infections, worsening of inflammatory bowel disease and drug reactions.  The patient understands that monitoring is required including a PPD at baseline and must alert us or the primary physician if symptoms of infection or other concerning signs are noted.
Metronidazole Pregnancy And Lactation Text: This medication is Pregnancy Category B and considered safe during pregnancy.  It is also excreted in breast milk.
Cyclosporine Pregnancy And Lactation Text: This medication is Pregnancy Category C and it isn't know if it is safe during pregnancy. This medication is excreted in breast milk.
Odomzo Counseling- I discussed with the patient the risks of Odomzo including but not limited to nausea, vomiting, diarrhea, constipation, weight loss, changes in the sense of taste, decreased appetite, muscle spasms, and hair loss.  The patient verbalized understanding of the proper use and possible adverse effects of Odomzo.  All of the patient's questions and concerns were addressed.
Minocycline Counseling: Patient advised regarding possible photosensitivity and discoloration of the teeth, skin, lips, tongue and gums.  Patient instructed to avoid sunlight, if possible.  When exposed to sunlight, patients should wear protective clothing, sunglasses, and sunscreen.  The patient was instructed to call the office immediately if the following severe adverse effects occur:  hearing changes, easy bruising/bleeding, severe headache, or vision changes.  The patient verbalized understanding of the proper use and possible adverse effects of minocycline.  All of the patient's questions and concerns were addressed.
Methotrexate Counseling:  Patient counseled regarding adverse effects of methotrexate including but not limited to nausea, vomiting, abnormalities in liver function tests. Patients may develop mouth sores, rash, diarrhea, and abnormalities in blood counts. The patient understands that monitoring is required including LFT's and blood counts.  There is a rare possibility of scarring of the liver and lung problems that can occur when taking methotrexate. Persistent nausea, loss of appetite, pale stools, dark urine, cough, and shortness of breath should be reported immediately. Patient advised to discontinue methotrexate treatment at least three months before attempting to become pregnant.  I discussed the need for folate supplements while taking methotrexate.  These supplements can decrease side effects during methotrexate treatment. The patient verbalized understanding of the proper use and possible adverse effects of methotrexate.  All of the patient's questions and concerns were addressed.
Itraconazole Counseling:  I discussed with the patient the risks of itraconazole including but not limited to liver damage, nausea/vomiting, neuropathy, and severe allergy.  The patient understands that this medication is best absorbed when taken with acidic beverages such as non-diet cola or ginger ale.  The patient understands that monitoring is required including baseline LFTs and repeat LFTs at intervals.  The patient understands that they are to contact us or the primary physician if concerning signs are noted.
Gabapentin Pregnancy And Lactation Text: This medication is Pregnancy Category C and isn't considered safe during pregnancy. It is excreted in breast milk.
Detail Level: Zone
Azithromycin Pregnancy And Lactation Text: This medication is considered safe during pregnancy and is also secreted in breast milk.
Tranexamic Acid Counseling:  Patient advised of the small risk of bleeding problems with tranexamic acid. They were also instructed to call if they developed any nausea, vomiting or diarrhea. All of the patient's questions and concerns were addressed.
Topical Retinoid counseling:  Patient advised to apply a pea-sized amount only at bedtime and wait 30 minutes after washing their face before applying.  If too drying, patient may add a non-comedogenic moisturizer. The patient verbalized understanding of the proper use and possible adverse effects of retinoids.  All of the patient's questions and concerns were addressed.
Benzoyl Peroxide Pregnancy And Lactation Text: This medication is Pregnancy Category C. It is unknown if benzoyl peroxide is excreted in breast milk.
Xeljanz Counseling: I discussed with the patient the risks of Xeljanz therapy including increased risk of infection, liver issues, headache, diarrhea, or cold symptoms. Live vaccines should be avoided. They were instructed to call if they have any problems.
Ivermectin Counseling:  Patient instructed to take medication on an empty stomach with a full glass of water.  Patient informed of potential adverse effects including but not limited to nausea, diarrhea, dizziness, itching, and swelling of the extremities or lymph nodes.  The patient verbalized understanding of the proper use and possible adverse effects of ivermectin.  All of the patient's questions and concerns were addressed.
Rituxan Counseling:  I discussed with the patient the risks of Rituxan infusions. Side effects can include infusion reactions, severe drug rashes including mucocutaneous reactions, reactivation of latent hepatitis and other infections and rarely progressive multifocal leukoencephalopathy.  All of the patient's questions and concerns were addressed.
Arava Counseling:  Patient counseled regarding adverse effects of Arava including but not limited to nausea, vomiting, abnormalities in liver function tests. Patients may develop mouth sores, rash, diarrhea, and abnormalities in blood counts. The patient understands that monitoring is required including LFTs and blood counts.  There is a rare possibility of scarring of the liver and lung problems that can occur when taking methotrexate. Persistent nausea, loss of appetite, pale stools, dark urine, cough, and shortness of breath should be reported immediately. Patient advised to discontinue Arava treatment and consult with a physician prior to attempting conception. The patient will have to undergo a treatment to eliminate Arava from the body prior to conception.
Bactrim Counseling:  I discussed with the patient the risks of sulfa antibiotics including but not limited to GI upset, allergic reaction, drug rash, diarrhea, dizziness, photosensitivity, and yeast infections.  Rarely, more serious reactions can occur including but not limited to aplastic anemia, agranulocytosis, methemoglobinemia, blood dyscrasias, liver or kidney failure, lung infiltrates or desquamative/blistering drug rashes.
Imiquimod Counseling:  I discussed with the patient the risks of imiquimod including but not limited to erythema, scaling, itching, weeping, crusting, and pain.  Patient understands that the inflammatory response to imiquimod is variable from person to person and was educated regarded proper titration schedule.  If flu-like symptoms develop, patient knows to discontinue the medication and contact us.
Glycopyrrolate Counseling:  I discussed with the patient the risks of glycopyrrolate including but not limited to skin rash, drowsiness, dry mouth, difficulty urinating, and blurred vision.
Methotrexate Pregnancy And Lactation Text: This medication is Pregnancy Category X and is known to cause fetal harm. This medication is excreted in breast milk.
Ketoconazole Counseling:   Patient counseled regarding improving absorption with orange juice.  Adverse effects include but are not limited to breast enlargement, headache, diarrhea, nausea, upset stomach, liver function test abnormalities, taste disturbance, and stomach pain.  There is a rare possibility of liver failure that can occur when taking ketoconazole. The patient understands that monitoring of LFTs may be required, especially at baseline. The patient verbalized understanding of the proper use and possible adverse effects of ketoconazole.  All of the patient's questions and concerns were addressed.
Carac Counseling:  I discussed with the patient the risks of Carac including but not limited to erythema, scaling, itching, weeping, crusting, and pain.
Tranexamic Acid Pregnancy And Lactation Text: It is unknown if this medication is safe during pregnancy or breast feeding.
Bactrim Pregnancy And Lactation Text: This medication is Pregnancy Category D and is known to cause fetal risk.  It is also excreted in breast milk.
Cimzia Counseling:  I discussed with the patient the risks of Cimzia including but not limited to immunosuppression, allergic reactions and infections.  The patient understands that monitoring is required including a PPD at baseline and must alert us or the primary physician if symptoms of infection or other concerning signs are noted.
Quinolones Counseling:  I discussed with the patient the risks of fluoroquinolones including but not limited to GI upset, allergic reaction, drug rash, diarrhea, dizziness, photosensitivity, yeast infections, liver function test abnormalities, tendonitis/tendon rupture.
Prednisone Counseling:  I discussed with the patient the risks of prolonged use of prednisone including but not limited to weight gain, insomnia, osteoporosis, mood changes, diabetes, susceptibility to infection, glaucoma and high blood pressure.  In cases where prednisone use is prolonged, patients should be monitored with blood pressure checks, serum glucose levels and an eye exam.  Additionally, the patient may need to be placed on GI prophylaxis, PCP prophylaxis, and calcium and vitamin D supplementation and/or a bisphosphonate.  The patient verbalized understanding of the proper use and the possible adverse effects of prednisone.  All of the patient's questions and concerns were addressed.
Glycopyrrolate Pregnancy And Lactation Text: This medication is Pregnancy Category B and is considered safe during pregnancy. It is unknown if it is excreted breast milk.
Otezla Counseling: The side effects of Otezla were discussed with the patient, including but not limited to worsening or new depression, weight loss, diarrhea, nausea, upper respiratory tract infection, and headache. Patient instructed to call the office should any adverse effect occur.  The patient verbalized understanding of the proper use and possible adverse effects of Otezla.  All the patient's questions and concerns were addressed.
Rituxan Pregnancy And Lactation Text: This medication is Pregnancy Category C and it isn't know if it is safe during pregnancy. It is unknown if this medication is excreted in breast milk but similar antibodies are known to be excreted.
Valtrex Counseling: I discussed with the patient the risks of valacyclovir including but not limited to kidney damage, nausea, vomiting and severe allergy.  The patient understands that if the infection seems to be worsening or is not improving, they are to call.
Include Pregnancy/Lactation Warning?: No
Otezla Pregnancy And Lactation Text: This medication is Pregnancy Category C and it isn't known if it is safe during pregnancy. It is unknown if it is excreted in breast milk.
Tazorac Counseling:  Patient advised that medication is irritating and drying.  Patient may need to apply sparingly and wash off after an hour before eventually leaving it on overnight.  The patient verbalized understanding of the proper use and possible adverse effects of tazorac.  All of the patient's questions and concerns were addressed.
Xelulisesz Pregnancy And Lactation Text: This medication is Pregnancy Category D and is not considered safe during pregnancy.  The risk during breast feeding is also uncertain.
Siliq Counseling:  I discussed with the patient the risks of Siliq including but not limited to new or worsening depression, suicidal thoughts and behavior, immunosuppression, malignancy, posterior leukoencephalopathy syndrome, and serious infections.  The patient understands that monitoring is required including a PPD at baseline and must alert us or the primary physician if symptoms of infection or other concerning signs are noted. There is also a special program designed to monitor depression which is required with Siliq.
Minoxidil Counseling: Minoxidil is a topical medication which can increase blood flow where it is applied. It is uncertain how this medication increases hair growth. Side effects are uncommon and include stinging and allergic reactions.
Tazorac Pregnancy And Lactation Text: This medication is not safe during pregnancy. It is unknown if this medication is excreted in breast milk.
Cephalexin Counseling: I counseled the patient regarding use of cephalexin as an antibiotic for prophylactic and/or therapeutic purposes. Cephalexin (commonly prescribed under brand name Keflex) is a cephalosporin antibiotic which is active against numerous classes of bacteria, including most skin bacteria. Side effects may include nausea, diarrhea, gastrointestinal upset, rash, hives, yeast infections, and in rare cases, hepatitis, kidney disease, seizures, fever, confusion, neurologic symptoms, and others. Patients with severe allergies to penicillin medications are cautioned that there is about a 10% incidence of cross-reactivity with cephalosporins. When possible, patients with penicillin allergies should use alternatives to cephalosporins for antibiotic therapy.
Cimzia Pregnancy And Lactation Text: This medication crosses the placenta but can be considered safe in certain situations. Cimzia may be excreted in breast milk.
Hydroxychloroquine Counseling:  I discussed with the patient that a baseline ophthalmologic exam is needed at the start of therapy and every year thereafter while on therapy. A CBC may also be warranted for monitoring.  The side effects of this medication were discussed with the patient, including but not limited to agranulocytosis, aplastic anemia, seizures, rashes, retinopathy, and liver toxicity. Patient instructed to call the office should any adverse effect occur.  The patient verbalized understanding of the proper use and possible adverse effects of Plaquenil.  All the patient's questions and concerns were addressed.
Ketoconazole Pregnancy And Lactation Text: This medication is Pregnancy Category C and it isn't know if it is safe during pregnancy. It is also excreted in breast milk and breast feeding isn't recommended.
Terbinafine Counseling: Patient counseling regarding adverse effects of terbinafine including but not limited to headache, diarrhea, rash, upset stomach, liver function test abnormalities, itching, taste/smell disturbance, nausea, abdominal pain, and flatulence.  There is a rare possibility of liver failure that can occur when taking terbinafine.  The patient understands that a baseline LFT and kidney function test may be required. The patient verbalized understanding of the proper use and possible adverse effects of terbinafine.  All of the patient's questions and concerns were addressed.
Calcipotriene Counseling:  I discussed with the patient the risks of calcipotriene including but not limited to erythema, scaling, itching, and irritation.
Clofazimine Counseling:  I discussed with the patient the risks of clofazimine including but not limited to skin and eye pigmentation, liver damage, nausea/vomiting, gastrointestinal bleeding and allergy.
Valtrex Pregnancy And Lactation Text: this medication is Pregnancy Category B and is considered safe during pregnancy. This medication is not directly found in breast milk but it's metabolite acyclovir is present.
Cosentyx Counseling:  I discussed with the patient the risks of Cosentyx including but not limited to worsening of Crohn's disease, immunosuppression, allergic reactions and infections.  The patient understands that monitoring is required including a PPD at baseline and must alert us or the primary physician if symptoms of infection or other concerning signs are noted.
Rifampin Counseling: I discussed with the patient the risks of rifampin including but not limited to liver damage, kidney damage, red-orange body fluids, nausea/vomiting and severe allergy.
Oxybutynin Counseling:  I discussed with the patient the risks of oxybutynin including but not limited to skin rash, drowsiness, dry mouth, difficulty urinating, and blurred vision.
Hydroxychloroquine Pregnancy And Lactation Text: This medication has been shown to cause fetal harm but it isn't assigned a Pregnancy Risk Category. There are small amounts excreted in breast milk.
Xolair Counseling:  Patient informed of potential adverse effects including but not limited to fever, muscle aches, rash and allergic reactions.  The patient verbalized understanding of the proper use and possible adverse effects of Xolair.  All of the patient's questions and concerns were addressed.
Oxybutynin Pregnancy And Lactation Text: This medication is Pregnancy Category B and is considered safe during pregnancy. It is unknown if it is excreted in breast milk.
Cephalexin Pregnancy And Lactation Text: This medication is Pregnancy Category B and considered safe during pregnancy.  It is also excreted in breast milk but can be used safely for shorter doses.
Topical Clindamycin Counseling: Patient counseled that this medication may cause skin irritation or allergic reactions.  In the event of skin irritation, the patient was advised to reduce the amount of the drug applied or use it less frequently.   The patient verbalized understanding of the proper use and possible adverse effects of clindamycin.  All of the patient's questions and concerns were addressed.
Xolair Pregnancy And Lactation Text: This medication is Pregnancy Category B and is considered safe during pregnancy. This medication is excreted in breast milk.
Clindamycin Counseling: I counseled the patient regarding use of clindamycin as an antibiotic for prophylactic and/or therapeutic purposes. Clindamycin is active against numerous classes of bacteria, including skin bacteria. Side effects may include nausea, diarrhea, gastrointestinal upset, rash, hives, yeast infections, and in rare cases, colitis.
Libtayo Counseling- I discussed with the patient the risks of Libtayo including but not limited to nausea, vomiting, diarrhea, and bone or muscle pain.  The patient verbalized understanding of the proper use and possible adverse effects of Libtayo.  All of the patient's questions and concerns were addressed.
Rifampin Pregnancy And Lactation Text: This medication is Pregnancy Category C and it isn't know if it is safe during pregnancy. It is also excreted in breast milk and should not be used if you are breast feeding.
Terbinafine Pregnancy And Lactation Text: This medication is Pregnancy Category B and is considered safe during pregnancy. It is also excreted in breast milk and breast feeding isn't recommended.
Calcipotriene Pregnancy And Lactation Text: This medication has not been proven safe during pregnancy. It is unknown if this medication is excreted in breast milk.
Simponi Counseling:  I discussed with the patient the risks of golimumab including but not limited to myelosuppression, immunosuppression, autoimmune hepatitis, demyelinating diseases, lymphoma, and serious infections.  The patient understands that monitoring is required including a PPD at baseline and must alert us or the primary physician if symptoms of infection or other concerning signs are noted.
Colchicine Counseling:  Patient counseled regarding adverse effects including but not limited to stomach upset (nausea, vomiting, stomach pain, or diarrhea).  Patient instructed to limit alcohol consumption while taking this medication.  Colchicine may reduce blood counts especially with prolonged use.  The patient understands that monitoring of kidney function and blood counts may be required, especially at baseline. The patient verbalized understanding of the proper use and possible adverse effects of colchicine.  All of the patient's questions and concerns were addressed.
Mirvaso Counseling: Mirvaso is a topical medication which can decrease superficial blood flow where applied. Side effects are uncommon and include stinging, redness and allergic reactions.
Acitretin Counseling:  I discussed with the patient the risks of acitretin including but not limited to hair loss, dry lips/skin/eyes, liver damage, hyperlipidemia, depression/suicidal ideation, photosensitivity.  Serious rare side effects can include but are not limited to pancreatitis, pseudotumor cerebri, bony changes, clot formation/stroke/heart attack.  Patient understands that alcohol is contraindicated since it can result in liver toxicity and significantly prolong the elimination of the drug by many years.
Sarecycline Counseling: Patient advised regarding possible photosensitivity and discoloration of the teeth, skin, lips, tongue and gums.  Patient instructed to avoid sunlight, if possible.  When exposed to sunlight, patients should wear protective clothing, sunglasses, and sunscreen.  The patient was instructed to call the office immediately if the following severe adverse effects occur:  hearing changes, easy bruising/bleeding, severe headache, or vision changes.  The patient verbalized understanding of the proper use and possible adverse effects of sarecycline.  All of the patient's questions and concerns were addressed.
Dupixent Counseling: I discussed with the patient the risks of dupilumab including but not limited to eye infection and irritation, cold sores, injection site reactions, worsening of asthma, allergic reactions and increased risk of parasitic infection.  Live vaccines should be avoided while taking dupilumab. Dupilumab will also interact with certain medications such as warfarin and cyclosporine. The patient understands that monitoring is required and they must alert us or the primary physician if symptoms of infection or other concerning signs are noted.
5-Fu Counseling: 5-Fluorouracil Counseling:  I discussed with the patient the risks of 5-fluorouracil including but not limited to erythema, scaling, itching, weeping, crusting, and pain.
Propranolol Counseling:  I discussed with the patient the risks of propranolol including but not limited to low heart rate, low blood pressure, low blood sugar, restlessness and increased cold sensitivity. They should call the office if they experience any of these side effects.
Acitretin Pregnancy And Lactation Text: This medication is Pregnancy Category X and should not be given to women who are pregnant or may become pregnant in the future. This medication is excreted in breast milk.
Libtayo Pregnancy And Lactation Text: This medication is contraindicated in pregnancy and when breast feeding.
Topical Sulfur Applications Counseling: Topical Sulfur Counseling: Patient counseled that this medication may cause skin irritation or allergic reactions.  In the event of skin irritation, the patient was advised to reduce the amount of the drug applied or use it less frequently.   The patient verbalized understanding of the proper use and possible adverse effects of topical sulfur application.  All of the patient's questions and concerns were addressed.
Clindamycin Pregnancy And Lactation Text: This medication can be used in pregnancy if certain situations. Clindamycin is also present in breast milk.
Niacinamide Counseling: I recommended taking niacin or niacinamide, also know as vitamin B3, twice daily. Recent evidence suggests that taking vitamin B3 (500 mg twice daily) can reduce the risk of actinic keratoses and non-melanoma skin cancers. Side effects of vitamin B3 include flushing and headache.
Propranolol Pregnancy And Lactation Text: This medication is Pregnancy Category C and it isn't known if it is safe during pregnancy. It is excreted in breast milk.
Azathioprine Counseling:  I discussed with the patient the risks of azathioprine including but not limited to myelosuppression, immunosuppression, hepatotoxicity, lymphoma, and infections.  The patient understands that monitoring is required including baseline LFTs, Creatinine, possible TPMP genotyping and weekly CBCs for the first month and then every 2 weeks thereafter.  The patient verbalized understanding of the proper use and possible adverse effects of azathioprine.  All of the patient's questions and concerns were addressed.
Cimetidine Counseling:  I discussed with the patient the risks of Cimetidine including but not limited to gynecomastia, headache, diarrhea, nausea, drowsiness, arrhythmias, pancreatitis, skin rashes, psychosis, bone marrow suppression and kidney toxicity.
Picato Counseling:  I discussed with the patient the risks of Picato including but not limited to erythema, scaling, itching, weeping, crusting, and pain.
Doxycycline Counseling:  Patient counseled regarding possible photosensitivity and increased risk for sunburn.  Patient instructed to avoid sunlight, if possible.  When exposed to sunlight, patients should wear protective clothing, sunglasses, and sunscreen.  The patient was instructed to call the office immediately if the following severe adverse effects occur:  hearing changes, easy bruising/bleeding, severe headache, or vision changes.  The patient verbalized understanding of the proper use and possible adverse effects of doxycycline.  All of the patient's questions and concerns were addressed.
Topical Sulfur Applications Pregnancy And Lactation Text: This medication is Pregnancy Category C and has an unknown safety profile during pregnancy. It is unknown if this topical medication is excreted in breast milk.
Dupixent Pregnancy And Lactation Text: This medication likely crosses the placenta but the risk for the fetus is uncertain. This medication is excreted in breast milk.
Bexarotene Counseling:  I discussed with the patient the risks of bexarotene including but not limited to hair loss, dry lips/skin/eyes, liver abnormalities, hyperlipidemia, pancreatitis, depression/suicidal ideation, photosensitivity, drug rash/allergic reactions, hypothyroidism, anemia, leukopenia, infection, cataracts, and teratogenicity.  Patient understands that they will need regular blood tests to check lipid profile, liver function tests, white blood cell count, thyroid function tests and pregnancy test if applicable.
Tetracycline Counseling: Patient counseled regarding possible photosensitivity and increased risk for sunburn.  Patient instructed to avoid sunlight, if possible.  When exposed to sunlight, patients should wear protective clothing, sunglasses, and sunscreen.  The patient was instructed to call the office immediately if the following severe adverse effects occur:  hearing changes, easy bruising/bleeding, severe headache, or vision changes.  The patient verbalized understanding of the proper use and possible adverse effects of tetracycline.  All of the patient's questions and concerns were addressed. Patient understands to avoid pregnancy while on therapy due to potential birth defects.
Skyrizi Counseling: I discussed with the patient the risks of risankizumab-rzaa including but not limited to immunosuppression, and serious infections.  The patient understands that monitoring is required including a PPD at baseline and must alert us or the primary physician if symptoms of infection or other concerning signs are noted.
Enbrel Counseling:  I discussed with the patient the risks of etanercept including but not limited to myelosuppression, immunosuppression, autoimmune hepatitis, demyelinating diseases, lymphoma, and infections.  The patient understands that monitoring is required including a PPD at baseline and must alert us or the primary physician if symptoms of infection or other concerning signs are noted.
Drysol Counseling:  I discussed with the patient the risks of drysol/aluminum chloride including but not limited to skin rash, itching, irritation, burning.
Birth Control Pills Counseling: Birth Control Pill Counseling: I discussed with the patient the potential side effects of OCPs including but not limited to increased risk of stroke, heart attack, thrombophlebitis, deep venous thrombosis, hepatic adenomas, breast changes, GI upset, headaches, and depression.  The patient verbalized understanding of the proper use and possible adverse effects of OCPs. All of the patient's questions and concerns were addressed.
Dapsone Counseling: I discussed with the patient the risks of dapsone including but not limited to hemolytic anemia, agranulocytosis, rashes, methemoglobinemia, kidney failure, peripheral neuropathy, headaches, GI upset, and liver toxicity.  Patients who start dapsone require monitoring including baseline LFTs and weekly CBCs for the first month, then every month thereafter.  The patient verbalized understanding of the proper use and possible adverse effects of dapsone.  All of the patient's questions and concerns were addressed.

## 2021-11-08 NOTE — PROCEDURE: EXCISION
Medical Necessity Information: It is in your best interest to select a reason for this procedure from the list below. All of these items fulfill various CMS LCD requirements except lesion extends to a margin.
Include Z78.9 (Other Specified Conditions Influencing Health Status) As An Associated Diagnosis?: No
Add Nub Associated Diagnoses If Applicable When Selecting Medical Necessity: Yes
Medical Necessity Clause: This procedure was medically necessary because the lesion that was treated was: growing, hurting, irritated.
Lab: [id number]
Lab Facility: 
Surgeon (Optional): Caryn Saenz NP
Size Of Lesion In Cm: 2.5
X Size Of Lesion In Cm (Optional): 0
Eye Clamp Note Details: An eye clamp was used during the procedure.
Excision Method: Round
Saucerization Depth: dermis and superficial adipose tissue
Repair Type: Intermediate
Suturegard Retention Suture: 2-0 Nylon
Retention Suture Bite Size: 3 mm
Length To Time In Minutes Device Was In Place: 10
Number Of Hemigard Strips Per Side: 1
Intermediate / Complex Repair - Final Wound Length In Cm: 1.2
Undermining Type: Entire Wound
Debridement Text: The wound edges were debrided prior to proceeding with the closure to facilitate wound healing.
Helical Rim Text: The closure involved the helical rim.
Vermilion Border Text: The closure involved the vermilion border.
Nostril Rim Text: The closure involved the nostril rim.
Retention Suture Text: Retention sutures were placed to support the closure and prevent dehiscence.
Suture Removal: 14 days
Epidermal Closure Graft Donor Site (Optional): simple interrupted
Graft Donor Site Bandage (Optional-Leave Blank If You Don't Want In Note): Steri-strips and a pressure bandage were applied to the donor site.
Detail Level: Detailed
Excision Depth: adipose tissue
Scalpel Size: 15 blade
Anesthesia Type: 1% lidocaine with epinephrine and a 1:10 solution of 8.4% sodium bicarbonate
Additional Anesthesia Volume In Cc: 3
Hemostasis: Manual Pressure
Estimated Blood Loss (Cc): minimal
Deep Sutures: 3-0 Vicryl
Number Of Deep Sutures (Optional): 2
Epidermal Sutures: 4-0 Nylon
Epidermal Closure: running subcuticular
Wound Care: Petrolatum
Dressing: pressure dressing
Suturegard Intro: Intraoperative tissue expansion was performed, utilizing the SUTUREGARD device, in order to reduce wound tension.
Suturegard Body: The suture ends were repeatedly re-tightened and re-clamped to achieve the desired tissue expansion.
Hemigard Intro: Due to skin fragility and wound tension, it was decided to use HEMIGARD adhesive retention suture devices to permit a linear closure. The skin was cleaned and dried for a 6cm distance away from the wound. Excessive hair, if present, was removed to allow for adhesion.
Hemigard Postcare Instructions: The HEMIGARD strips are to remain completely dry for at least 5-7 days.
Positioning (Leave Blank If You Do Not Want): The patient was placed in a comfortable position exposing the surgical site.
Complex Repair Preamble Text (Leave Blank If You Do Not Want): Extensive wide undermining was performed.
Intermediate Repair Preamble Text (Leave Blank If You Do Not Want): Undermining was performed with blunt dissection.
Fusiform Excision Additional Text (Leave Blank If You Do Not Want): The margin was drawn around the clinically apparent lesion.  A fusiform shape was then drawn on the skin incorporating the lesion and margins.  Incisions were then made along these lines to the appropriate tissue plane and the lesion was extirpated.
Eliptical Excision Additional Text (Leave Blank If You Do Not Want): The margin was drawn around the clinically apparent lesion.  An elliptical shape was then drawn on the skin incorporating the lesion and margins.  Incisions were then made along these lines to the appropriate tissue plane and the lesion was extirpated.
Saucerization Excision Additional Text (Leave Blank If You Do Not Want): The margin was drawn around the clinically apparent lesion.  Incisions were then made along these lines, in a tangential fashion, to the appropriate tissue plane and the lesion was extirpated.
Slit Excision Additional Text (Leave Blank If You Do Not Want): A linear line was drawn on the skin overlying the lesion. An incision was made slowly until the lesion was visualized.  Once visualized, the lesion was removed with blunt dissection.
Excisional Biopsy Additional Text (Leave Blank If You Do Not Want): The margin was drawn around the clinically apparent lesion. An elliptical shape was then drawn on the skin incorporating the lesion and margins.  Incisions were then made along these lines to the appropriate tissue plane and the lesion was extirpated.
Perilesional Excision Additional Text (Leave Blank If You Do Not Want): The margin was drawn around the clinically apparent lesion. Incisions were then made along these lines to the appropriate tissue plane and the lesion was extirpated.
Repair Performed By Another Provider Text (Leave Blank If You Do Not Want): After the tissue was excised the defect was repaired by another provider.
No Repair - Repaired With Adjacent Surgical Defect Text (Leave Blank If You Do Not Want): After the excision the defect was repaired concurrently with another surgical defect which was in close approximation.
Adjacent Tissue Transfer Text: The defect edges were debeveled with a #15 scalpel blade.  Given the location of the defect and the proximity to free margins an adjacent tissue transfer was deemed most appropriate.  Using a sterile surgical marker, an appropriate flap was drawn incorporating the defect and placing the expected incisions within the relaxed skin tension lines where possible.    The area thus outlined was incised deep to adipose tissue with a #15 scalpel blade.  The skin margins were undermined to an appropriate distance in all directions utilizing iris scissors.
Advancement Flap (Single) Text: The defect edges were debeveled with a #15 scalpel blade.  Given the location of the defect and the proximity to free margins a single advancement flap was deemed most appropriate.  Using a sterile surgical marker, an appropriate advancement flap was drawn incorporating the defect and placing the expected incisions within the relaxed skin tension lines where possible.    The area thus outlined was incised deep to adipose tissue with a #15 scalpel blade.  The skin margins were undermined to an appropriate distance in all directions utilizing iris scissors.
Advancement Flap (Double) Text: The defect edges were debeveled with a #15 scalpel blade.  Given the location of the defect and the proximity to free margins a double advancement flap was deemed most appropriate.  Using a sterile surgical marker, the appropriate advancement flaps were drawn incorporating the defect and placing the expected incisions within the relaxed skin tension lines where possible.    The area thus outlined was incised deep to adipose tissue with a #15 scalpel blade.  The skin margins were undermined to an appropriate distance in all directions utilizing iris scissors.
Burow's Advancement Flap Text: The defect edges were debeveled with a #15 scalpel blade.  Given the location of the defect and the proximity to free margins a Burow's advancement flap was deemed most appropriate.  Using a sterile surgical marker, the appropriate advancement flap was drawn incorporating the defect and placing the expected incisions within the relaxed skin tension lines where possible.    The area thus outlined was incised deep to adipose tissue with a #15 scalpel blade.  The skin margins were undermined to an appropriate distance in all directions utilizing iris scissors.
Chonodrocutaneous Helical Advancement Flap Text: The defect edges were debeveled with a #15 scalpel blade.  Given the location of the defect and the proximity to free margins a chondrocutaneous helical advancement flap was deemed most appropriate.  Using a sterile surgical marker, the appropriate advancement flap was drawn incorporating the defect and placing the expected incisions within the relaxed skin tension lines where possible.    The area thus outlined was incised deep to adipose tissue with a #15 scalpel blade.  The skin margins were undermined to an appropriate distance in all directions utilizing iris scissors.
Crescentic Advancement Flap Text: The defect edges were debeveled with a #15 scalpel blade.  Given the location of the defect and the proximity to free margins a crescentic advancement flap was deemed most appropriate.  Using a sterile surgical marker, the appropriate advancement flap was drawn incorporating the defect and placing the expected incisions within the relaxed skin tension lines where possible.    The area thus outlined was incised deep to adipose tissue with a #15 scalpel blade.  The skin margins were undermined to an appropriate distance in all directions utilizing iris scissors.
A-T Advancement Flap Text: The defect edges were debeveled with a #15 scalpel blade.  Given the location of the defect, shape of the defect and the proximity to free margins an A-T advancement flap was deemed most appropriate.  Using a sterile surgical marker, an appropriate advancement flap was drawn incorporating the defect and placing the expected incisions within the relaxed skin tension lines where possible.    The area thus outlined was incised deep to adipose tissue with a #15 scalpel blade.  The skin margins were undermined to an appropriate distance in all directions utilizing iris scissors.
O-T Advancement Flap Text: The defect edges were debeveled with a #15 scalpel blade.  Given the location of the defect, shape of the defect and the proximity to free margins an O-T advancement flap was deemed most appropriate.  Using a sterile surgical marker, an appropriate advancement flap was drawn incorporating the defect and placing the expected incisions within the relaxed skin tension lines where possible.    The area thus outlined was incised deep to adipose tissue with a #15 scalpel blade.  The skin margins were undermined to an appropriate distance in all directions utilizing iris scissors.
O-L Flap Text: The defect edges were debeveled with a #15 scalpel blade.  Given the location of the defect, shape of the defect and the proximity to free margins an O-L flap was deemed most appropriate.  Using a sterile surgical marker, an appropriate advancement flap was drawn incorporating the defect and placing the expected incisions within the relaxed skin tension lines where possible.    The area thus outlined was incised deep to adipose tissue with a #15 scalpel blade.  The skin margins were undermined to an appropriate distance in all directions utilizing iris scissors.
O-Z Flap Text: The defect edges were debeveled with a #15 scalpel blade.  Given the location of the defect, shape of the defect and the proximity to free margins an O-Z flap was deemed most appropriate.  Using a sterile surgical marker, an appropriate transposition flap was drawn incorporating the defect and placing the expected incisions within the relaxed skin tension lines where possible. The area thus outlined was incised deep to adipose tissue with a #15 scalpel blade.  The skin margins were undermined to an appropriate distance in all directions utilizing iris scissors.
Double O-Z Flap Text: The defect edges were debeveled with a #15 scalpel blade.  Given the location of the defect, shape of the defect and the proximity to free margins a Double O-Z flap was deemed most appropriate.  Using a sterile surgical marker, an appropriate transposition flap was drawn incorporating the defect and placing the expected incisions within the relaxed skin tension lines where possible. The area thus outlined was incised deep to adipose tissue with a #15 scalpel blade.  The skin margins were undermined to an appropriate distance in all directions utilizing iris scissors.
V-Y Flap Text: The defect edges were debeveled with a #15 scalpel blade.  Given the location of the defect, shape of the defect and the proximity to free margins a V-Y flap was deemed most appropriate.  Using a sterile surgical marker, an appropriate advancement flap was drawn incorporating the defect and placing the expected incisions within the relaxed skin tension lines where possible.    The area thus outlined was incised deep to adipose tissue with a #15 scalpel blade.  The skin margins were undermined to an appropriate distance in all directions utilizing iris scissors.
Advancement-Rotation Flap Text: The defect edges were debeveled with a #15 scalpel blade.  Given the location of the defect, shape of the defect and the proximity to free margins an advancement-rotation flap was deemed most appropriate.  Using a sterile surgical marker, an appropriate flap was drawn incorporating the defect and placing the expected incisions within the relaxed skin tension lines where possible. The area thus outlined was incised deep to adipose tissue with a #15 scalpel blade.  The skin margins were undermined to an appropriate distance in all directions utilizing iris scissors.
Mercedes Flap Text: The defect edges were debeveled with a #15 scalpel blade.  Given the location of the defect, shape of the defect and the proximity to free margins a Mercedes flap was deemed most appropriate.  Using a sterile surgical marker, an appropriate advancement flap was drawn incorporating the defect and placing the expected incisions within the relaxed skin tension lines where possible. The area thus outlined was incised deep to adipose tissue with a #15 scalpel blade.  The skin margins were undermined to an appropriate distance in all directions utilizing iris scissors.
Modified Advancement Flap Text: The defect edges were debeveled with a #15 scalpel blade.  Given the location of the defect, shape of the defect and the proximity to free margins a modified advancement flap was deemed most appropriate.  Using a sterile surgical marker, an appropriate advancement flap was drawn incorporating the defect and placing the expected incisions within the relaxed skin tension lines where possible.    The area thus outlined was incised deep to adipose tissue with a #15 scalpel blade.  The skin margins were undermined to an appropriate distance in all directions utilizing iris scissors.
Mucosal Advancement Flap Text: Given the location of the defect, shape of the defect and the proximity to free margins a mucosal advancement flap was deemed most appropriate. Incisions were made with a 15 blade scalpel in the appropriate fashion along the cutaneous vermillion border and the mucosal lip. The remaining actinically damaged mucosal tissue was excised.  The mucosal advancement flap was then elevated to the gingival sulcus with care taken to preserve the neurovascular structures and advanced into the primary defect. Care was taken to ensure that precise realignment of the vermilion border was achieved.
Peng Advancement Flap Text: The defect edges were debeveled with a #15 scalpel blade.  Given the location of the defect, shape of the defect and the proximity to free margins a Peng advancement flap was deemed most appropriate.  Using a sterile surgical marker, an appropriate advancement flap was drawn incorporating the defect and placing the expected incisions within the relaxed skin tension lines where possible. The area thus outlined was incised deep to adipose tissue with a #15 scalpel blade.  The skin margins were undermined to an appropriate distance in all directions utilizing iris scissors.
Hatchet Flap Text: The defect edges were debeveled with a #15 scalpel blade.  Given the location of the defect, shape of the defect and the proximity to free margins a hatchet flap was deemed most appropriate.  Using a sterile surgical marker, an appropriate hatchet flap was drawn incorporating the defect and placing the expected incisions within the relaxed skin tension lines where possible.    The area thus outlined was incised deep to adipose tissue with a #15 scalpel blade.  The skin margins were undermined to an appropriate distance in all directions utilizing iris scissors.
Rotation Flap Text: The defect edges were debeveled with a #15 scalpel blade.  Given the location of the defect, shape of the defect and the proximity to free margins a rotation flap was deemed most appropriate.  Using a sterile surgical marker, an appropriate rotation flap was drawn incorporating the defect and placing the expected incisions within the relaxed skin tension lines where possible.    The area thus outlined was incised deep to adipose tissue with a #15 scalpel blade.  The skin margins were undermined to an appropriate distance in all directions utilizing iris scissors.
Spiral Flap Text: The defect edges were debeveled with a #15 scalpel blade.  Given the location of the defect, shape of the defect and the proximity to free margins a spiral flap was deemed most appropriate.  Using a sterile surgical marker, an appropriate rotation flap was drawn incorporating the defect and placing the expected incisions within the relaxed skin tension lines where possible. The area thus outlined was incised deep to adipose tissue with a #15 scalpel blade.  The skin margins were undermined to an appropriate distance in all directions utilizing iris scissors.
Staged Advancement Flap Text: The defect edges were debeveled with a #15 scalpel blade.  Given the location of the defect, shape of the defect and the proximity to free margins a staged advancement flap was deemed most appropriate.  Using a sterile surgical marker, an appropriate advancement flap was drawn incorporating the defect and placing the expected incisions within the relaxed skin tension lines where possible. The area thus outlined was incised deep to adipose tissue with a #15 scalpel blade.  The skin margins were undermined to an appropriate distance in all directions utilizing iris scissors.
Star Wedge Flap Text: The defect edges were debeveled with a #15 scalpel blade.  Given the location of the defect, shape of the defect and the proximity to free margins a star wedge flap was deemed most appropriate.  Using a sterile surgical marker, an appropriate rotation flap was drawn incorporating the defect and placing the expected incisions within the relaxed skin tension lines where possible. The area thus outlined was incised deep to adipose tissue with a #15 scalpel blade.  The skin margins were undermined to an appropriate distance in all directions utilizing iris scissors.
Transposition Flap Text: The defect edges were debeveled with a #15 scalpel blade.  Given the location of the defect and the proximity to free margins a transposition flap was deemed most appropriate.  Using a sterile surgical marker, an appropriate transposition flap was drawn incorporating the defect.    The area thus outlined was incised deep to adipose tissue with a #15 scalpel blade.  The skin margins were undermined to an appropriate distance in all directions utilizing iris scissors.
Muscle Hinge Flap Text: The defect edges were debeveled with a #15 scalpel blade.  Given the size, depth and location of the defect and the proximity to free margins a muscle hinge flap was deemed most appropriate.  Using a sterile surgical marker, an appropriate hinge flap was drawn incorporating the defect. The area thus outlined was incised with a #15 scalpel blade.  The skin margins were undermined to an appropriate distance in all directions utilizing iris scissors.
Mustarde Flap Text: The defect edges were debeveled with a #15 scalpel blade.  Given the size, depth and location of the defect and the proximity to free margins a Mustarde flap was deemed most appropriate.  Using a sterile surgical marker, an appropriate flap was drawn incorporating the defect. The area thus outlined was incised with a #15 scalpel blade.  The skin margins were undermined to an appropriate distance in all directions utilizing iris scissors.
Nasal Turnover Hinge Flap Text: The defect edges were debeveled with a #15 scalpel blade.  Given the size, depth, location of the defect and the defect being full thickness a nasal turnover hinge flap was deemed most appropriate.  Using a sterile surgical marker, an appropriate hinge flap was drawn incorporating the defect. The area thus outlined was incised with a #15 scalpel blade. The flap was designed to recreate the nasal mucosal lining and the alar rim. The skin margins were undermined to an appropriate distance in all directions utilizing iris scissors.
Nasalis-Muscle-Based Myocutaneous Island Pedicle Flap Text: Using a #15 blade, an incision was made around the donor flap to the level of the nasalis muscle. Wide lateral undermining was then performed in both the subcutaneous plane above the nasalis muscle, and in a submuscular plane just above periosteum. This allowed the formation of a free nasalis muscle axial pedicle (based on the angular artery) which was still attached to the actual cutaneous flap, increasing its mobility and vascular viability. Hemostasis was obtained with pinpoint electrocoagulation. The flap was mobilized into position and the pivotal anchor points positioned and stabilized with buried interrupted sutures. Subcutaneous and dermal tissues were closed in a multilayered fashion with sutures. Tissue redundancies were excised, and the epidermal edges were apposed without significant tension and sutured with sutures.
Orbicularis Oris Muscle Flap Text: The defect edges were debeveled with a #15 scalpel blade.  Given that the defect affected the competency of the oral sphincter an obicularis oris muscle flap was deemed most appropriate to restore this competency and normal muscle function.  Using a sterile surgical marker, an appropriate flap was drawn incorporating the defect. The area thus outlined was incised with a #15 scalpel blade.
Melolabial Transposition Flap Text: The defect edges were debeveled with a #15 scalpel blade.  Given the location of the defect and the proximity to free margins a melolabial flap was deemed most appropriate.  Using a sterile surgical marker, an appropriate melolabial transposition flap was drawn incorporating the defect.    The area thus outlined was incised deep to adipose tissue with a #15 scalpel blade.  The skin margins were undermined to an appropriate distance in all directions utilizing iris scissors.
Rhombic Flap Text: The defect edges were debeveled with a #15 scalpel blade.  Given the location of the defect and the proximity to free margins a rhombic flap was deemed most appropriate.  Using a sterile surgical marker, an appropriate rhombic flap was drawn incorporating the defect.    The area thus outlined was incised deep to adipose tissue with a #15 scalpel blade.  The skin margins were undermined to an appropriate distance in all directions utilizing iris scissors.
Rhomboid Transposition Flap Text: The defect edges were debeveled with a #15 scalpel blade.  Given the location of the defect and the proximity to free margins a rhomboid transposition flap was deemed most appropriate.  Using a sterile surgical marker, an appropriate rhomboid flap was drawn incorporating the defect.    The area thus outlined was incised deep to adipose tissue with a #15 scalpel blade.  The skin margins were undermined to an appropriate distance in all directions utilizing iris scissors.
Bi-Rhombic Flap Text: The defect edges were debeveled with a #15 scalpel blade.  Given the location of the defect and the proximity to free margins a bi-rhombic flap was deemed most appropriate.  Using a sterile surgical marker, an appropriate rhombic flap was drawn incorporating the defect. The area thus outlined was incised deep to adipose tissue with a #15 scalpel blade.  The skin margins were undermined to an appropriate distance in all directions utilizing iris scissors.
Helical Rim Advancement Flap Text: The defect edges were debeveled with a #15 blade scalpel.  Given the location of the defect and the proximity to free margins (helical rim) a double helical rim advancement flap was deemed most appropriate.  Using a sterile surgical marker, the appropriate advancement flaps were drawn incorporating the defect and placing the expected incisions between the helical rim and antihelix where possible.  The area thus outlined was incised through and through with a #15 scalpel blade.  With a skin hook and iris scissors, the flaps were gently and sharply undermined and freed up.
Bilateral Helical Rim Advancement Flap Text: The defect edges were debeveled with a #15 blade scalpel.  Given the location of the defect and the proximity to free margins (helical rim) a bilateral helical rim advancement flap was deemed most appropriate.  Using a sterile surgical marker, the appropriate advancement flaps were drawn incorporating the defect and placing the expected incisions between the helical rim and antihelix where possible.  The area thus outlined was incised through and through with a #15 scalpel blade.  With a skin hook and iris scissors, the flaps were gently and sharply undermined and freed up.
Ear Star Wedge Flap Text: The defect edges were debeveled with a #15 blade scalpel.  Given the location of the defect and the proximity to free margins (helical rim) an ear star wedge flap was deemed most appropriate.  Using a sterile surgical marker, the appropriate flap was drawn incorporating the defect and placing the expected incisions between the helical rim and antihelix where possible.  The area thus outlined was incised through and through with a #15 scalpel blade.
Banner Transposition Flap Text: The defect edges were debeveled with a #15 scalpel blade.  Given the location of the defect and the proximity to free margins a Banner transposition flap was deemed most appropriate.  Using a sterile surgical marker, an appropriate flap drawn around the defect. The area thus outlined was incised deep to adipose tissue with a #15 scalpel blade.  The skin margins were undermined to an appropriate distance in all directions utilizing iris scissors.
Bilobed Flap Text: The defect edges were debeveled with a #15 scalpel blade.  Given the location of the defect and the proximity to free margins a bilobe flap was deemed most appropriate.  Using a sterile surgical marker, an appropriate bilobe flap drawn around the defect.    The area thus outlined was incised deep to adipose tissue with a #15 scalpel blade.  The skin margins were undermined to an appropriate distance in all directions utilizing iris scissors.
Bilobed Transposition Flap Text: The defect edges were debeveled with a #15 scalpel blade.  Given the location of the defect and the proximity to free margins a bilobed transposition flap was deemed most appropriate.  Using a sterile surgical marker, an appropriate bilobe flap drawn around the defect.    The area thus outlined was incised deep to adipose tissue with a #15 scalpel blade.  The skin margins were undermined to an appropriate distance in all directions utilizing iris scissors.
Trilobed Flap Text: The defect edges were debeveled with a #15 scalpel blade.  Given the location of the defect and the proximity to free margins a trilobed flap was deemed most appropriate.  Using a sterile surgical marker, an appropriate trilobed flap drawn around the defect.    The area thus outlined was incised deep to adipose tissue with a #15 scalpel blade.  The skin margins were undermined to an appropriate distance in all directions utilizing iris scissors.
Dorsal Nasal Flap Text: The defect edges were debeveled with a #15 scalpel blade.  Given the location of the defect and the proximity to free margins a dorsal nasal flap was deemed most appropriate.  Using a sterile surgical marker, an appropriate dorsal nasal flap was drawn around the defect.    The area thus outlined was incised deep to adipose tissue with a #15 scalpel blade.  The skin margins were undermined to an appropriate distance in all directions utilizing iris scissors.
Island Pedicle Flap Text: The defect edges were debeveled with a #15 scalpel blade.  Given the location of the defect, shape of the defect and the proximity to free margins an island pedicle advancement flap was deemed most appropriate.  Using a sterile surgical marker, an appropriate advancement flap was drawn incorporating the defect, outlining the appropriate donor tissue and placing the expected incisions within the relaxed skin tension lines where possible.    The area thus outlined was incised deep to adipose tissue with a #15 scalpel blade.  The skin margins were undermined to an appropriate distance in all directions around the primary defect and laterally outward around the island pedicle utilizing iris scissors.  There was minimal undermining beneath the pedicle flap.
Island Pedicle Flap With Canthal Suspension Text: The defect edges were debeveled with a #15 scalpel blade.  Given the location of the defect, shape of the defect and the proximity to free margins an island pedicle advancement flap was deemed most appropriate.  Using a sterile surgical marker, an appropriate advancement flap was drawn incorporating the defect, outlining the appropriate donor tissue and placing the expected incisions within the relaxed skin tension lines where possible. The area thus outlined was incised deep to adipose tissue with a #15 scalpel blade.  The skin margins were undermined to an appropriate distance in all directions around the primary defect and laterally outward around the island pedicle utilizing iris scissors.  There was minimal undermining beneath the pedicle flap. A suspension suture was placed in the canthal tendon to prevent tension and prevent ectropion.
Alar Island Pedicle Flap Text: The defect edges were debeveled with a #15 scalpel blade.  Given the location of the defect, shape of the defect and the proximity to the alar rim an island pedicle advancement flap was deemed most appropriate.  Using a sterile surgical marker, an appropriate advancement flap was drawn incorporating the defect, outlining the appropriate donor tissue and placing the expected incisions within the nasal ala running parallel to the alar rim. The area thus outlined was incised with a #15 scalpel blade.  The skin margins were undermined minimally to an appropriate distance in all directions around the primary defect and laterally outward around the island pedicle utilizing iris scissors.  There was minimal undermining beneath the pedicle flap.
Double Island Pedicle Flap Text: The defect edges were debeveled with a #15 scalpel blade.  Given the location of the defect, shape of the defect and the proximity to free margins a double island pedicle advancement flap was deemed most appropriate.  Using a sterile surgical marker, an appropriate advancement flap was drawn incorporating the defect, outlining the appropriate donor tissue and placing the expected incisions within the relaxed skin tension lines where possible.    The area thus outlined was incised deep to adipose tissue with a #15 scalpel blade.  The skin margins were undermined to an appropriate distance in all directions around the primary defect and laterally outward around the island pedicle utilizing iris scissors.  There was minimal undermining beneath the pedicle flap.
Island Pedicle Flap-Requiring Vessel Identification Text: The defect edges were debeveled with a #15 scalpel blade.  Given the location of the defect, shape of the defect and the proximity to free margins an island pedicle advancement flap was deemed most appropriate.  Using a sterile surgical marker, an appropriate advancement flap was drawn, based on the axial vessel mentioned above, incorporating the defect, outlining the appropriate donor tissue and placing the expected incisions within the relaxed skin tension lines where possible.    The area thus outlined was incised deep to adipose tissue with a #15 scalpel blade.  The skin margins were undermined to an appropriate distance in all directions around the primary defect and laterally outward around the island pedicle utilizing iris scissors.  There was minimal undermining beneath the pedicle flap.
Keystone Flap Text: The defect edges were debeveled with a #15 scalpel blade.  Given the location of the defect, shape of the defect a keystone flap was deemed most appropriate.  Using a sterile surgical marker, an appropriate keystone flap was drawn incorporating the defect, outlining the appropriate donor tissue and placing the expected incisions within the relaxed skin tension lines where possible. The area thus outlined was incised deep to adipose tissue with a #15 scalpel blade.  The skin margins were undermined to an appropriate distance in all directions around the primary defect and laterally outward around the flap utilizing iris scissors.
O-T Plasty Text: The defect edges were debeveled with a #15 scalpel blade.  Given the location of the defect, shape of the defect and the proximity to free margins an O-T plasty was deemed most appropriate.  Using a sterile surgical marker, an appropriate O-T plasty was drawn incorporating the defect and placing the expected incisions within the relaxed skin tension lines where possible.    The area thus outlined was incised deep to adipose tissue with a #15 scalpel blade.  The skin margins were undermined to an appropriate distance in all directions utilizing iris scissors.
O-Z Plasty Text: The defect edges were debeveled with a #15 scalpel blade.  Given the location of the defect, shape of the defect and the proximity to free margins an O-Z plasty (double transposition flap) was deemed most appropriate.  Using a sterile surgical marker, the appropriate transposition flaps were drawn incorporating the defect and placing the expected incisions within the relaxed skin tension lines where possible.    The area thus outlined was incised deep to adipose tissue with a #15 scalpel blade.  The skin margins were undermined to an appropriate distance in all directions utilizing iris scissors.  Hemostasis was achieved with electrocautery.  The flaps were then transposed into place, one clockwise and the other counterclockwise, and anchored with interrupted buried subcutaneous sutures.
Double O-Z Plasty Text: The defect edges were debeveled with a #15 scalpel blade.  Given the location of the defect, shape of the defect and the proximity to free margins a Double O-Z plasty (double transposition flap) was deemed most appropriate.  Using a sterile surgical marker, the appropriate transposition flaps were drawn incorporating the defect and placing the expected incisions within the relaxed skin tension lines where possible. The area thus outlined was incised deep to adipose tissue with a #15 scalpel blade.  The skin margins were undermined to an appropriate distance in all directions utilizing iris scissors.  Hemostasis was achieved with electrocautery.  The flaps were then transposed into place, one clockwise and the other counterclockwise, and anchored with interrupted buried subcutaneous sutures.
V-Y Plasty Text: The defect edges were debeveled with a #15 scalpel blade.  Given the location of the defect, shape of the defect and the proximity to free margins an V-Y advancement flap was deemed most appropriate.  Using a sterile surgical marker, an appropriate advancement flap was drawn incorporating the defect and placing the expected incisions within the relaxed skin tension lines where possible.    The area thus outlined was incised deep to adipose tissue with a #15 scalpel blade.  The skin margins were undermined to an appropriate distance in all directions utilizing iris scissors.
H Plasty Text: Given the location of the defect, shape of the defect and the proximity to free margins a H-plasty was deemed most appropriate for repair.  Using a sterile surgical marker, the appropriate advancement arms of the H-plasty were drawn incorporating the defect and placing the expected incisions within the relaxed skin tension lines where possible. The area thus outlined was incised deep to adipose tissue with a #15 scalpel blade. The skin margins were undermined to an appropriate distance in all directions utilizing iris scissors.  The opposing advancement arms were then advanced into place in opposite direction and anchored with interrupted buried subcutaneous sutures.
W Plasty Text: The lesion was extirpated to the level of the fat with a #15 scalpel blade.  Given the location of the defect, shape of the defect and the proximity to free margins a W-plasty was deemed most appropriate for repair.  Using a sterile surgical marker, the appropriate transposition arms of the W-plasty were drawn incorporating the defect and placing the expected incisions within the relaxed skin tension lines where possible.    The area thus outlined was incised deep to adipose tissue with a #15 scalpel blade.  The skin margins were undermined to an appropriate distance in all directions utilizing iris scissors.  The opposing transposition arms were then transposed into place in opposite direction and anchored with interrupted buried subcutaneous sutures.
Z Plasty Text: The lesion was extirpated to the level of the fat with a #15 scalpel blade.  Given the location of the defect, shape of the defect and the proximity to free margins a Z-plasty was deemed most appropriate for repair.  Using a sterile surgical marker, the appropriate transposition arms of the Z-plasty were drawn incorporating the defect and placing the expected incisions within the relaxed skin tension lines where possible.    The area thus outlined was incised deep to adipose tissue with a #15 scalpel blade.  The skin margins were undermined to an appropriate distance in all directions utilizing iris scissors.  The opposing transposition arms were then transposed into place in opposite direction and anchored with interrupted buried subcutaneous sutures.
Zygomaticofacial Flap Text: Given the location of the defect, shape of the defect and the proximity to free margins a zygomaticofacial flap was deemed most appropriate for repair.  Using a sterile surgical marker, the appropriate flap was drawn incorporating the defect and placing the expected incisions within the relaxed skin tension lines where possible. The area thus outlined was incised deep to adipose tissue with a #15 scalpel blade with preservation of a vascular pedicle.  The skin margins were undermined to an appropriate distance in all directions utilizing iris scissors.  The flap was then placed into the defect and anchored with interrupted buried subcutaneous sutures.
Cheek Interpolation Flap Text: A decision was made to reconstruct the defect utilizing an interpolation axial flap and a staged reconstruction.  A telfa template was made of the defect.  This telfa template was then used to outline the Cheek Interpolation flap.  The donor area for the pedicle flap was then injected with anesthesia.  The flap was excised through the skin and subcutaneous tissue down to the layer of the underlying musculature.  The interpolation flap was carefully excised within this deep plane to maintain its blood supply.  The edges of the donor site were undermined.   The donor site was closed in a primary fashion.  The pedicle was then rotated into position and sutured.  Once the tube was sutured into place, adequate blood supply was confirmed with blanching and refill.  The pedicle was then wrapped with xeroform gauze and dressed appropriately with a telfa and gauze bandage to ensure continued blood supply and protect the attached pedicle.
Cheek-To-Nose Interpolation Flap Text: A decision was made to reconstruct the defect utilizing an interpolation axial flap and a staged reconstruction.  A telfa template was made of the defect.  This telfa template was then used to outline the Cheek-To-Nose Interpolation flap.  The donor area for the pedicle flap was then injected with anesthesia.  The flap was excised through the skin and subcutaneous tissue down to the layer of the underlying musculature.  The interpolation flap was carefully excised within this deep plane to maintain its blood supply.  The edges of the donor site were undermined.   The donor site was closed in a primary fashion.  The pedicle was then rotated into position and sutured.  Once the tube was sutured into place, adequate blood supply was confirmed with blanching and refill.  The pedicle was then wrapped with xeroform gauze and dressed appropriately with a telfa and gauze bandage to ensure continued blood supply and protect the attached pedicle.
Interpolation Flap Text: A decision was made to reconstruct the defect utilizing an interpolation axial flap and a staged reconstruction.  A telfa template was made of the defect.  This telfa template was then used to outline the interpolation flap.  The donor area for the pedicle flap was then injected with anesthesia.  The flap was excised through the skin and subcutaneous tissue down to the layer of the underlying musculature.  The interpolation flap was carefully excised within this deep plane to maintain its blood supply.  The edges of the donor site were undermined.   The donor site was closed in a primary fashion.  The pedicle was then rotated into position and sutured.  Once the tube was sutured into place, adequate blood supply was confirmed with blanching and refill.  The pedicle was then wrapped with xeroform gauze and dressed appropriately with a telfa and gauze bandage to ensure continued blood supply and protect the attached pedicle.
Melolabial Interpolation Flap Text: A decision was made to reconstruct the defect utilizing an interpolation axial flap and a staged reconstruction.  A telfa template was made of the defect.  This telfa template was then used to outline the melolabial interpolation flap.  The donor area for the pedicle flap was then injected with anesthesia.  The flap was excised through the skin and subcutaneous tissue down to the layer of the underlying musculature.  The pedicle flap was carefully excised within this deep plane to maintain its blood supply.  The edges of the donor site were undermined.   The donor site was closed in a primary fashion.  The pedicle was then rotated into position and sutured.  Once the tube was sutured into place, adequate blood supply was confirmed with blanching and refill.  The pedicle was then wrapped with xeroform gauze and dressed appropriately with a telfa and gauze bandage to ensure continued blood supply and protect the attached pedicle.
Mastoid Interpolation Flap Text: A decision was made to reconstruct the defect utilizing an interpolation axial flap and a staged reconstruction.  A telfa template was made of the defect.  This telfa template was then used to outline the mastoid interpolation flap.  The donor area for the pedicle flap was then injected with anesthesia.  The flap was excised through the skin and subcutaneous tissue down to the layer of the underlying musculature.  The pedicle flap was carefully excised within this deep plane to maintain its blood supply.  The edges of the donor site were undermined.   The donor site was closed in a primary fashion.  The pedicle was then rotated into position and sutured.  Once the tube was sutured into place, adequate blood supply was confirmed with blanching and refill.  The pedicle was then wrapped with xeroform gauze and dressed appropriately with a telfa and gauze bandage to ensure continued blood supply and protect the attached pedicle.
Posterior Auricular Interpolation Flap Text: A decision was made to reconstruct the defect utilizing an interpolation axial flap and a staged reconstruction.  A telfa template was made of the defect.  This telfa template was then used to outline the posterior auricular interpolation flap.  The donor area for the pedicle flap was then injected with anesthesia.  The flap was excised through the skin and subcutaneous tissue down to the layer of the underlying musculature.  The pedicle flap was carefully excised within this deep plane to maintain its blood supply.  The edges of the donor site were undermined.   The donor site was closed in a primary fashion.  The pedicle was then rotated into position and sutured.  Once the tube was sutured into place, adequate blood supply was confirmed with blanching and refill.  The pedicle was then wrapped with xeroform gauze and dressed appropriately with a telfa and gauze bandage to ensure continued blood supply and protect the attached pedicle.
Paramedian Forehead Flap Text: A decision was made to reconstruct the defect utilizing an interpolation axial flap and a staged reconstruction.  A telfa template was made of the defect.  This telfa template was then used to outline the paramedian forehead pedicle flap.  The donor area for the pedicle flap was then injected with anesthesia.  The flap was excised through the skin and subcutaneous tissue down to the layer of the underlying musculature.  The pedicle flap was carefully excised within this deep plane to maintain its blood supply.  The edges of the donor site were undermined.   The donor site was closed in a primary fashion.  The pedicle was then rotated into position and sutured.  Once the tube was sutured into place, adequate blood supply was confirmed with blanching and refill.  The pedicle was then wrapped with xeroform gauze and dressed appropriately with a telfa and gauze bandage to ensure continued blood supply and protect the attached pedicle.
Lip Wedge Excision Repair Text: Given the location of the defect and the proximity to free margins a full thickness wedge repair was deemed most appropriate.  Using a sterile surgical marker, the appropriate repair was drawn incorporating the defect and placing the expected incisions perpendicular to the vermilion border.  The vermilion border was also meticulously outlined to ensure appropriate reapproximation during the repair.  The area thus outlined was incised through and through with a #15 scalpel blade.  The muscularis and dermis were reaproximated with deep sutures following hemostasis. Care was taken to realign the vermilion border before proceeding with the superficial closure.  Once the vermilion was realigned the superfical and mucosal closure was finished.
Ftsg Text: The defect edges were debeveled with a #15 scalpel blade.  Given the location of the defect, shape of the defect and the proximity to free margins a full thickness skin graft was deemed most appropriate.  Using a sterile surgical marker, the primary defect shape was transferred to the donor site. The area thus outlined was incised deep to adipose tissue with a #15 scalpel blade.  The harvested graft was then trimmed of adipose tissue until only dermis and epidermis was left.  The skin margins of the secondary defect were undermined to an appropriate distance in all directions utilizing iris scissors.  The secondary defect was closed with interrupted buried subcutaneous sutures.  The skin edges were then re-apposed with running  sutures.  The skin graft was then placed in the primary defect and oriented appropriately.
Split-Thickness Skin Graft Text: The defect edges were debeveled with a #15 scalpel blade.  Given the location of the defect, shape of the defect and the proximity to free margins a split thickness skin graft was deemed most appropriate.  Using a sterile surgical marker, the primary defect shape was transferred to the donor site. The split thickness graft was then harvested.  The skin graft was then placed in the primary defect and oriented appropriately.
Burow's Graft Text: The defect edges were debeveled with a #15 scalpel blade.  Given the location of the defect, shape of the defect, the proximity to free margins and the presence of a standing cone deformity a Burow's skin graft was deemed most appropriate. The standing cone was removed and this tissue was then trimmed to the shape of the primary defect. The adipose tissue was also removed until only dermis and epidermis were left.  The skin margins of the secondary defect were undermined to an appropriate distance in all directions utilizing iris scissors.  The secondary defect was closed with interrupted buried subcutaneous sutures.  The skin edges were then re-apposed with running  sutures.  The skin graft was then placed in the primary defect and oriented appropriately.
Cartilage Graft Text: The defect edges were debeveled with a #15 scalpel blade.  Given the location of the defect, shape of the defect, the fact the defect involved a full thickness cartilage defect a cartilage graft was deemed most appropriate.  An appropriate donor site was identified, cleansed, and anesthetized. The cartilage graft was then harvested and transferred to the recipient site, oriented appropriately and then sutured into place.  The secondary defect was then repaired using a primary closure.
Composite Graft Text: The defect edges were debeveled with a #15 scalpel blade.  Given the location of the defect, shape of the defect, the proximity to free margins and the fact the defect was full thickness a composite graft was deemed most appropriate.  The defect was outline and then transferred to the donor site.  A full thickness graft was then excised from the donor site. The graft was then placed in the primary defect, oriented appropriately and then sutured into place.  The secondary defect was then repaired using a primary closure.
Epidermal Autograft Text: The defect edges were debeveled with a #15 scalpel blade.  Given the location of the defect, shape of the defect and the proximity to free margins an epidermal autograft was deemed most appropriate.  Using a sterile surgical marker, the primary defect shape was transferred to the donor site. The epidermal graft was then harvested.  The skin graft was then placed in the primary defect and oriented appropriately.
Dermal Autograft Text: The defect edges were debeveled with a #15 scalpel blade.  Given the location of the defect, shape of the defect and the proximity to free margins a dermal autograft was deemed most appropriate.  Using a sterile surgical marker, the primary defect shape was transferred to the donor site. The area thus outlined was incised deep to adipose tissue with a #15 scalpel blade.  The harvested graft was then trimmed of adipose and epidermal tissue until only dermis was left.  The skin graft was then placed in the primary defect and oriented appropriately.
Skin Substitute Text: The defect edges were debeveled with a #15 scalpel blade.  Given the location of the defect, shape of the defect and the proximity to free margins a skin substitute graft was deemed most appropriate.  The graft material was trimmed to fit the size of the defect. The graft was then placed in the primary defect and oriented appropriately.
Tissue Cultured Epidermal Autograft Text: The defect edges were debeveled with a #15 scalpel blade.  Given the location of the defect, shape of the defect and the proximity to free margins a tissue cultured epidermal autograft was deemed most appropriate.  The graft was then trimmed to fit the size of the defect.  The graft was then placed in the primary defect and oriented appropriately.
Xenograft Text: The defect edges were debeveled with a #15 scalpel blade.  Given the location of the defect, shape of the defect and the proximity to free margins a xenograft was deemed most appropriate.  The graft was then trimmed to fit the size of the defect.  The graft was then placed in the primary defect and oriented appropriately.
Purse String (Intermediate) Text: Given the location of the defect and the characteristics of the surrounding skin a purse string intermediate closure was deemed most appropriate.  Undermining was performed circumferentially around the surgical defect.  A purse string suture was then placed and tightened.
Purse String (Simple) Text: Given the location of the defect and the characteristics of the surrounding skin a purse string simple closure was deemed most appropriate.  Undermining was performed circumferentially around the surgical defect.  A purse string suture was then placed and tightened.
Complex Repair And Single Advancement Flap Text: The defect edges were debeveled with a #15 scalpel blade.  The primary defect was closed partially with a complex linear closure.  Given the location of the remaining defect, shape of the defect and the proximity to free margins a single advancement flap was deemed most appropriate for complete closure of the defect.  Using a sterile surgical marker, an appropriate advancement flap was drawn incorporating the defect and placing the expected incisions within the relaxed skin tension lines where possible.    The area thus outlined was incised deep to adipose tissue with a #15 scalpel blade.  The skin margins were undermined to an appropriate distance in all directions utilizing iris scissors.
Complex Repair And Double Advancement Flap Text: The defect edges were debeveled with a #15 scalpel blade.  The primary defect was closed partially with a complex linear closure.  Given the location of the remaining defect, shape of the defect and the proximity to free margins a double advancement flap was deemed most appropriate for complete closure of the defect.  Using a sterile surgical marker, an appropriate advancement flap was drawn incorporating the defect and placing the expected incisions within the relaxed skin tension lines where possible.    The area thus outlined was incised deep to adipose tissue with a #15 scalpel blade.  The skin margins were undermined to an appropriate distance in all directions utilizing iris scissors.
Complex Repair And Modified Advancement Flap Text: The defect edges were debeveled with a #15 scalpel blade.  The primary defect was closed partially with a complex linear closure.  Given the location of the remaining defect, shape of the defect and the proximity to free margins a modified advancement flap was deemed most appropriate for complete closure of the defect.  Using a sterile surgical marker, an appropriate advancement flap was drawn incorporating the defect and placing the expected incisions within the relaxed skin tension lines where possible.    The area thus outlined was incised deep to adipose tissue with a #15 scalpel blade.  The skin margins were undermined to an appropriate distance in all directions utilizing iris scissors.
Complex Repair And A-T Advancement Flap Text: The defect edges were debeveled with a #15 scalpel blade.  The primary defect was closed partially with a complex linear closure.  Given the location of the remaining defect, shape of the defect and the proximity to free margins an A-T advancement flap was deemed most appropriate for complete closure of the defect.  Using a sterile surgical marker, an appropriate advancement flap was drawn incorporating the defect and placing the expected incisions within the relaxed skin tension lines where possible.    The area thus outlined was incised deep to adipose tissue with a #15 scalpel blade.  The skin margins were undermined to an appropriate distance in all directions utilizing iris scissors.
Complex Repair And O-T Advancement Flap Text: The defect edges were debeveled with a #15 scalpel blade.  The primary defect was closed partially with a complex linear closure.  Given the location of the remaining defect, shape of the defect and the proximity to free margins an O-T advancement flap was deemed most appropriate for complete closure of the defect.  Using a sterile surgical marker, an appropriate advancement flap was drawn incorporating the defect and placing the expected incisions within the relaxed skin tension lines where possible.    The area thus outlined was incised deep to adipose tissue with a #15 scalpel blade.  The skin margins were undermined to an appropriate distance in all directions utilizing iris scissors.
Complex Repair And O-L Flap Text: The defect edges were debeveled with a #15 scalpel blade.  The primary defect was closed partially with a complex linear closure.  Given the location of the remaining defect, shape of the defect and the proximity to free margins an O-L flap was deemed most appropriate for complete closure of the defect.  Using a sterile surgical marker, an appropriate flap was drawn incorporating the defect and placing the expected incisions within the relaxed skin tension lines where possible.    The area thus outlined was incised deep to adipose tissue with a #15 scalpel blade.  The skin margins were undermined to an appropriate distance in all directions utilizing iris scissors.
Complex Repair And Bilobe Flap Text: The defect edges were debeveled with a #15 scalpel blade.  The primary defect was closed partially with a complex linear closure.  Given the location of the remaining defect, shape of the defect and the proximity to free margins a bilobe flap was deemed most appropriate for complete closure of the defect.  Using a sterile surgical marker, an appropriate advancement flap was drawn incorporating the defect and placing the expected incisions within the relaxed skin tension lines where possible.    The area thus outlined was incised deep to adipose tissue with a #15 scalpel blade.  The skin margins were undermined to an appropriate distance in all directions utilizing iris scissors.
Complex Repair And Melolabial Flap Text: The defect edges were debeveled with a #15 scalpel blade.  The primary defect was closed partially with a complex linear closure.  Given the location of the remaining defect, shape of the defect and the proximity to free margins a melolabial flap was deemed most appropriate for complete closure of the defect.  Using a sterile surgical marker, an appropriate advancement flap was drawn incorporating the defect and placing the expected incisions within the relaxed skin tension lines where possible.    The area thus outlined was incised deep to adipose tissue with a #15 scalpel blade.  The skin margins were undermined to an appropriate distance in all directions utilizing iris scissors.
Complex Repair And Rotation Flap Text: The defect edges were debeveled with a #15 scalpel blade.  The primary defect was closed partially with a complex linear closure.  Given the location of the remaining defect, shape of the defect and the proximity to free margins a rotation flap was deemed most appropriate for complete closure of the defect.  Using a sterile surgical marker, an appropriate advancement flap was drawn incorporating the defect and placing the expected incisions within the relaxed skin tension lines where possible.    The area thus outlined was incised deep to adipose tissue with a #15 scalpel blade.  The skin margins were undermined to an appropriate distance in all directions utilizing iris scissors.
Complex Repair And Rhombic Flap Text: The defect edges were debeveled with a #15 scalpel blade.  The primary defect was closed partially with a complex linear closure.  Given the location of the remaining defect, shape of the defect and the proximity to free margins a rhombic flap was deemed most appropriate for complete closure of the defect.  Using a sterile surgical marker, an appropriate advancement flap was drawn incorporating the defect and placing the expected incisions within the relaxed skin tension lines where possible.    The area thus outlined was incised deep to adipose tissue with a #15 scalpel blade.  The skin margins were undermined to an appropriate distance in all directions utilizing iris scissors.
Complex Repair And Transposition Flap Text: The defect edges were debeveled with a #15 scalpel blade.  The primary defect was closed partially with a complex linear closure.  Given the location of the remaining defect, shape of the defect and the proximity to free margins a transposition flap was deemed most appropriate for complete closure of the defect.  Using a sterile surgical marker, an appropriate advancement flap was drawn incorporating the defect and placing the expected incisions within the relaxed skin tension lines where possible.    The area thus outlined was incised deep to adipose tissue with a #15 scalpel blade.  The skin margins were undermined to an appropriate distance in all directions utilizing iris scissors.
Complex Repair And V-Y Plasty Text: The defect edges were debeveled with a #15 scalpel blade.  The primary defect was closed partially with a complex linear closure.  Given the location of the remaining defect, shape of the defect and the proximity to free margins a V-Y plasty was deemed most appropriate for complete closure of the defect.  Using a sterile surgical marker, an appropriate advancement flap was drawn incorporating the defect and placing the expected incisions within the relaxed skin tension lines where possible.    The area thus outlined was incised deep to adipose tissue with a #15 scalpel blade.  The skin margins were undermined to an appropriate distance in all directions utilizing iris scissors.
Complex Repair And M Plasty Text: The defect edges were debeveled with a #15 scalpel blade.  The primary defect was closed partially with a complex linear closure.  Given the location of the remaining defect, shape of the defect and the proximity to free margins an M plasty was deemed most appropriate for complete closure of the defect.  Using a sterile surgical marker, an appropriate advancement flap was drawn incorporating the defect and placing the expected incisions within the relaxed skin tension lines where possible.    The area thus outlined was incised deep to adipose tissue with a #15 scalpel blade.  The skin margins were undermined to an appropriate distance in all directions utilizing iris scissors.
Complex Repair And Double M Plasty Text: The defect edges were debeveled with a #15 scalpel blade.  The primary defect was closed partially with a complex linear closure.  Given the location of the remaining defect, shape of the defect and the proximity to free margins a double M plasty was deemed most appropriate for complete closure of the defect.  Using a sterile surgical marker, an appropriate advancement flap was drawn incorporating the defect and placing the expected incisions within the relaxed skin tension lines where possible.    The area thus outlined was incised deep to adipose tissue with a #15 scalpel blade.  The skin margins were undermined to an appropriate distance in all directions utilizing iris scissors.
Complex Repair And W Plasty Text: The defect edges were debeveled with a #15 scalpel blade.  The primary defect was closed partially with a complex linear closure.  Given the location of the remaining defect, shape of the defect and the proximity to free margins a W plasty was deemed most appropriate for complete closure of the defect.  Using a sterile surgical marker, an appropriate advancement flap was drawn incorporating the defect and placing the expected incisions within the relaxed skin tension lines where possible.    The area thus outlined was incised deep to adipose tissue with a #15 scalpel blade.  The skin margins were undermined to an appropriate distance in all directions utilizing iris scissors.
Complex Repair And Z Plasty Text: The defect edges were debeveled with a #15 scalpel blade.  The primary defect was closed partially with a complex linear closure.  Given the location of the remaining defect, shape of the defect and the proximity to free margins a Z plasty was deemed most appropriate for complete closure of the defect.  Using a sterile surgical marker, an appropriate advancement flap was drawn incorporating the defect and placing the expected incisions within the relaxed skin tension lines where possible.    The area thus outlined was incised deep to adipose tissue with a #15 scalpel blade.  The skin margins were undermined to an appropriate distance in all directions utilizing iris scissors.
Complex Repair And Dorsal Nasal Flap Text: The defect edges were debeveled with a #15 scalpel blade.  The primary defect was closed partially with a complex linear closure.  Given the location of the remaining defect, shape of the defect and the proximity to free margins a dorsal nasal flap was deemed most appropriate for complete closure of the defect.  Using a sterile surgical marker, an appropriate flap was drawn incorporating the defect and placing the expected incisions within the relaxed skin tension lines where possible.    The area thus outlined was incised deep to adipose tissue with a #15 scalpel blade.  The skin margins were undermined to an appropriate distance in all directions utilizing iris scissors.
Complex Repair And Ftsg Text: The defect edges were debeveled with a #15 scalpel blade.  The primary defect was closed partially with a complex linear closure.  Given the location of the defect, shape of the defect and the proximity to free margins a full thickness skin graft was deemed most appropriate to repair the remaining defect.  The graft was trimmed to fit the size of the remaining defect.  The graft was then placed in the primary defect, oriented appropriately, and sutured into place.
Complex Repair And Burow's Graft Text: The defect edges were debeveled with a #15 scalpel blade.  The primary defect was closed partially with a complex linear closure.  Given the location of the defect, shape of the defect, the proximity to free margins and the presence of a standing cone deformity a Burow's graft was deemed most appropriate to repair the remaining defect.  The graft was trimmed to fit the size of the remaining defect.  The graft was then placed in the primary defect, oriented appropriately, and sutured into place.
Complex Repair And Split-Thickness Skin Graft Text: The defect edges were debeveled with a #15 scalpel blade.  The primary defect was closed partially with a complex linear closure.  Given the location of the defect, shape of the defect and the proximity to free margins a split thickness skin graft was deemed most appropriate to repair the remaining defect.  The graft was trimmed to fit the size of the remaining defect.  The graft was then placed in the primary defect, oriented appropriately, and sutured into place.
Complex Repair And Epidermal Autograft Text: The defect edges were debeveled with a #15 scalpel blade.  The primary defect was closed partially with a complex linear closure.  Given the location of the defect, shape of the defect and the proximity to free margins an epidermal autograft was deemed most appropriate to repair the remaining defect.  The graft was trimmed to fit the size of the remaining defect.  The graft was then placed in the primary defect, oriented appropriately, and sutured into place.
Complex Repair And Dermal Autograft Text: The defect edges were debeveled with a #15 scalpel blade.  The primary defect was closed partially with a complex linear closure.  Given the location of the defect, shape of the defect and the proximity to free margins an dermal autograft was deemed most appropriate to repair the remaining defect.  The graft was trimmed to fit the size of the remaining defect.  The graft was then placed in the primary defect, oriented appropriately, and sutured into place.
Complex Repair And Tissue Cultured Epidermal Autograft Text: The defect edges were debeveled with a #15 scalpel blade.  The primary defect was closed partially with a complex linear closure.  Given the location of the defect, shape of the defect and the proximity to free margins an tissue cultured epidermal autograft was deemed most appropriate to repair the remaining defect.  The graft was trimmed to fit the size of the remaining defect.  The graft was then placed in the primary defect, oriented appropriately, and sutured into place.
Complex Repair And Xenograft Text: The defect edges were debeveled with a #15 scalpel blade.  The primary defect was closed partially with a complex linear closure.  Given the location of the defect, shape of the defect and the proximity to free margins a xenograft was deemed most appropriate to repair the remaining defect.  The graft was trimmed to fit the size of the remaining defect.  The graft was then placed in the primary defect, oriented appropriately, and sutured into place.
Complex Repair And Skin Substitute Graft Text: The defect edges were debeveled with a #15 scalpel blade.  The primary defect was closed partially with a complex linear closure.  Given the location of the remaining defect, shape of the defect and the proximity to free margins a skin substitute graft was deemed most appropriate to repair the remaining defect.  The graft was trimmed to fit the size of the remaining defect.  The graft was then placed in the primary defect, oriented appropriately, and sutured into place.
Consent was obtained from the patient. The risks and benefits to therapy were discussed in detail. Specifically, the risks of infection, scarring, bleeding, prolonged wound healing, incomplete removal, allergy to anesthesia, nerve injury and recurrence were addressed. Prior to the procedure, the treatment site was clearly identified and confirmed by the patient. All components of Universal Protocol/PAUSE Rule completed.
Post-Care Instructions: I reviewed with the patient in detail post-care instructions. Patient is not to engage in any heavy lifting, exercise, or swimming for the next 14 days. Should the patient develop any fevers, chills, bleeding, severe pain patient will contact the office immediately.
Home Suture Removal Text: Patient was provided a home suture removal kit and will remove their sutures at home.  If they have any questions or difficulties they will call the office.
Where Do You Want The Question To Include Opioid Counseling Located?: Case Summary Tab
Billing Type: Third-Party Bill
Information: Selecting Yes will display possible errors in your note based on the variables you have selected. This validation is only offered as a suggestion for you. PLEASE NOTE THAT THE VALIDATION TEXT WILL BE REMOVED WHEN YOU FINALIZE YOUR NOTE. IF YOU WANT TO FAX A PRELIMINARY NOTE YOU WILL NEED TO TOGGLE THIS TO 'NO' IF YOU DO NOT WANT IT IN YOUR FAXED NOTE.

## 2021-11-23 ENCOUNTER — APPOINTMENT (RX ONLY)
Dept: URBAN - METROPOLITAN AREA CLINIC 35 | Facility: CLINIC | Age: 66
Setting detail: DERMATOLOGY
End: 2021-11-23

## 2021-11-23 DIAGNOSIS — Z48.02 ENCOUNTER FOR REMOVAL OF SUTURES: ICD-10-CM

## 2021-11-23 PROCEDURE — ? SUTURE REMOVAL (NO GLOBAL PERIOD)

## 2021-11-23 PROCEDURE — 99211 OFF/OP EST MAY X REQ PHY/QHP: CPT

## 2021-11-23 ASSESSMENT — LOCATION DETAILED DESCRIPTION DERM: LOCATION DETAILED: SUPERIOR THORACIC SPINE

## 2021-11-23 ASSESSMENT — LOCATION ZONE DERM: LOCATION ZONE: TRUNK

## 2021-11-23 ASSESSMENT — LOCATION SIMPLE DESCRIPTION DERM: LOCATION SIMPLE: UPPER BACK

## 2021-11-24 ENCOUNTER — HOSPITAL ENCOUNTER (OUTPATIENT)
Dept: CARDIOLOGY | Facility: MEDICAL CENTER | Age: 66
End: 2021-11-24
Attending: INTERNAL MEDICINE
Payer: MEDICARE

## 2021-11-24 DIAGNOSIS — I10 ESSENTIAL HYPERTENSION: Chronic | ICD-10-CM

## 2021-11-24 DIAGNOSIS — I47.29 NSVT (NONSUSTAINED VENTRICULAR TACHYCARDIA) (HCC): ICD-10-CM

## 2021-11-24 DIAGNOSIS — I49.9 CARDIAC ARRHYTHMIA, UNSPECIFIED CARDIAC ARRHYTHMIA TYPE: ICD-10-CM

## 2021-11-24 DIAGNOSIS — I49.3 PVC (PREMATURE VENTRICULAR CONTRACTION): ICD-10-CM

## 2021-11-24 DIAGNOSIS — R00.2 PALPITATIONS: ICD-10-CM

## 2021-11-24 LAB
LV EJECT FRACT MOD 2C 99903: 60.05
LV EJECT FRACT MOD 4C 99902: 68.36
LV EJECT FRACT MOD BP 99901: 63.47

## 2021-11-24 PROCEDURE — 93306 TTE W/DOPPLER COMPLETE: CPT

## 2021-11-24 PROCEDURE — 93306 TTE W/DOPPLER COMPLETE: CPT | Mod: 26 | Performed by: INTERNAL MEDICINE

## 2021-11-29 DIAGNOSIS — R05.9 COUGH: ICD-10-CM

## 2021-11-29 RX ORDER — CEPHALEXIN 500 MG/1
CAPSULE ORAL
COMMUNITY
Start: 2021-11-08 | End: 2021-12-06

## 2021-12-06 ENCOUNTER — OFFICE VISIT (OUTPATIENT)
Dept: CARDIOLOGY | Facility: MEDICAL CENTER | Age: 66
End: 2021-12-06
Payer: MEDICARE

## 2021-12-06 VITALS
RESPIRATION RATE: 14 BRPM | BODY MASS INDEX: 24.52 KG/M2 | DIASTOLIC BLOOD PRESSURE: 80 MMHG | HEART RATE: 66 BPM | HEIGHT: 73 IN | OXYGEN SATURATION: 100 % | SYSTOLIC BLOOD PRESSURE: 120 MMHG | WEIGHT: 185 LBS

## 2021-12-06 DIAGNOSIS — I49.3 PVC (PREMATURE VENTRICULAR CONTRACTION): ICD-10-CM

## 2021-12-06 DIAGNOSIS — I10 ESSENTIAL HYPERTENSION: Chronic | ICD-10-CM

## 2021-12-06 PROCEDURE — 99213 OFFICE O/P EST LOW 20 MIN: CPT | Performed by: INTERNAL MEDICINE

## 2021-12-06 ASSESSMENT — ENCOUNTER SYMPTOMS
CONSTITUTIONAL NEGATIVE: 1
ABDOMINAL PAIN: 0
CARDIOVASCULAR NEGATIVE: 1
BRUISES/BLEEDS EASILY: 0
VOMITING: 0
SHORTNESS OF BREATH: 0
MUSCULOSKELETAL NEGATIVE: 1
MYALGIAS: 0
NERVOUS/ANXIOUS: 0
NEUROLOGICAL NEGATIVE: 1
PALPITATIONS: 0
FEVER: 0
DEPRESSION: 0
HEADACHES: 0
DOUBLE VISION: 0
EYES NEGATIVE: 1
WEAKNESS: 0
CHILLS: 0
NAUSEA: 0
FOCAL WEAKNESS: 0
RESPIRATORY NEGATIVE: 1
PSYCHIATRIC NEGATIVE: 1
GASTROINTESTINAL NEGATIVE: 1
BLURRED VISION: 0
DIZZINESS: 0
COUGH: 0
WEIGHT LOSS: 0
CLAUDICATION: 0

## 2021-12-06 ASSESSMENT — FIBROSIS 4 INDEX: FIB4 SCORE: 1.06

## 2021-12-06 NOTE — PROGRESS NOTES
Chief Complaint   Patient presents with   • Hypertension     F/V Dx: Essential hypertension   • Arrhythmia     F/V Dx: Cardiac arrhythmia, unspecified cardiac arrhythmia type   • Tachycardia       Subjective     Evan Wright is a 65 y.o. male who presents today for follow up of premature ventricular contractions.    Since the patient's last visit on 09/08/21, he has been doing well clinically. He denies chest pain, shortness of breath, palpitations, nausea/vomiting or diaphoresis. He underwent unremarkable cardiac studies as described.    Past Medical History:   Diagnosis Date   • Hypertension    • Insomnia    • Testosterone insufficiency      Past Surgical History:   Procedure Laterality Date   • FOOT SURGERY Left 2010    left foot neuroma     Family History   Problem Relation Age of Onset   • Hypertension Mother    • Cancer Father         liver CA   • Alcohol/Drug Father    • Hypertension Father    • No Known Problems Son    • No Known Problems Daughter    • Heart Disease Neg Hx      Social History     Socioeconomic History   • Marital status:      Spouse name: Not on file   • Number of children: 2   • Years of education: Not on file   • Highest education level: Not on file   Occupational History   • Occupation: Retired   Tobacco Use   • Smoking status: Never Smoker   • Smokeless tobacco: Never Used   Substance and Sexual Activity   • Alcohol use: Yes     Comment: wine daily 5-6   • Drug use: No   • Sexual activity: Yes     Partners: Female   Other Topics Concern   • Not on file   Social History Narrative    Retired business owner      2 grown kids     Social Determinants of Health     Financial Resource Strain:    • Difficulty of Paying Living Expenses: Not on file   Food Insecurity:    • Worried About Running Out of Food in the Last Year: Not on file   • Ran Out of Food in the Last Year: Not on file   Transportation Needs: No Transportation Needs   • Lack of Transportation (Medical): No   •  Lack of Transportation (Non-Medical): No   Physical Activity: Sufficiently Active   • Days of Exercise per Week: 7 days   • Minutes of Exercise per Session: 40 min   Stress:    • Feeling of Stress : Not on file   Social Connections:    • Frequency of Communication with Friends and Family: Not on file   • Frequency of Social Gatherings with Friends and Family: Not on file   • Attends Yazidism Services: Not on file   • Active Member of Clubs or Organizations: Not on file   • Attends Club or Organization Meetings: Not on file   • Marital Status: Not on file   Intimate Partner Violence:    • Fear of Current or Ex-Partner: Not on file   • Emotionally Abused: Not on file   • Physically Abused: Not on file   • Sexually Abused: Not on file   Housing Stability:    • Unable to Pay for Housing in the Last Year: Not on file   • Number of Places Lived in the Last Year: Not on file   • Unstable Housing in the Last Year: Not on file     No Known Allergies     (Medications reviewed.)  Outpatient Encounter Medications as of 12/6/2021   Medication Sig Dispense Refill   • Testosterone 1.62 % Gel      • clobetasol (TEMOVATE) 0.05 % Cream Apply topically twice a day 45 g 2   • fluticasone (FLONASE) 50 MCG/ACT nasal spray Administer 1 Spray into affected nostril(S) every day.     • amLODIPine (NORVASC) 10 MG Tab TAKE ONE TABLET BY MOUTH EVERY DAY 90 Tab 3   • losartan (COZAAR) 50 MG Tab TAKE ONE TABLET BY MOUTH EVERY DAY 90 Tab 3   • desonide (TRIDESILON) 0.05 % Cream      • tadalafil (CIALIS) 20 MG tablet Take 1 Tab by mouth as needed for Erectile Dysfunction (Max 1 dose per 24 hrs). 10 Tab 6   • Ascorbic Acid (VITAMIN C PO) Take  by mouth.     • Cholecalciferol (VITAMIN D) 2000 units Cap Take 2,000 Units by mouth every day.     • B Complex Vitamins (B COMPLEX 1 PO) Take 1 Tab by mouth every day.     • [DISCONTINUED] cephALEXin (KEFLEX) 500 MG Cap  (Patient not taking: Reported on 12/6/2021)     • [DISCONTINUED] Hydrocod Polst-CPM  "Polst ER (TUSSIONEX) 10-8 MG/5ML Suspension Extended Release Take 5 mL by mouth every 12 hours as needed for Cough for up to 7 days. (Patient not taking: Reported on 12/6/2021) 70 mL 0   • [DISCONTINUED] ALPRAZolam (XANAX) 0.25 MG Tab  (Patient not taking: Reported on 12/6/2021)     • [DISCONTINUED] Doxepin HCl 3 MG Tab Take 3 mg by mouth at bedtime as needed.  May repeat 1 time.. (Patient not taking: Reported on 12/6/2021) 30 tablet 2     No facility-administered encounter medications on file as of 12/6/2021.     Review of Systems   Constitutional: Negative.  Negative for chills, fever, malaise/fatigue and weight loss.   HENT: Negative.  Negative for hearing loss.    Eyes: Negative.  Negative for blurred vision and double vision.   Respiratory: Negative.  Negative for cough and shortness of breath.    Cardiovascular: Negative.  Negative for chest pain, palpitations, claudication and leg swelling.   Gastrointestinal: Negative.  Negative for abdominal pain, nausea and vomiting.   Genitourinary: Negative.  Negative for dysuria and urgency.   Musculoskeletal: Negative.  Negative for joint pain and myalgias.   Skin: Negative.  Negative for itching and rash.   Neurological: Negative.  Negative for dizziness, focal weakness, weakness and headaches.   Endo/Heme/Allergies: Negative.  Does not bruise/bleed easily.   Psychiatric/Behavioral: Negative.  Negative for depression. The patient is not nervous/anxious.               Objective     /80 (BP Location: Left arm, Patient Position: Sitting, BP Cuff Size: Adult)   Pulse 66   Resp 14   Ht 1.854 m (6' 1\")   Wt 83.9 kg (185 lb)   SpO2 100%   BMI 24.41 kg/m²     Physical Exam  Constitutional:       Appearance: He is well-developed.   HENT:      Head: Normocephalic and atraumatic.   Neck:      Vascular: No JVD.   Cardiovascular:      Rate and Rhythm: Normal rate and regular rhythm.      Heart sounds: Normal heart sounds.   Pulmonary:      Effort: Pulmonary effort is " normal.      Breath sounds: Normal breath sounds.   Abdominal:      General: Bowel sounds are normal.      Palpations: Abdomen is soft.      Comments: No hepatosplenomegaly.   Musculoskeletal:         General: Normal range of motion.   Lymphadenopathy:      Cervical: No cervical adenopathy.   Skin:     General: Skin is warm and dry.   Neurological:      Mental Status: He is alert and oriented to person, place, and time.            CARDIAC STUDIES/PROCEDURES:     BIOTEL CONCLUSIONS (07/26/21-08/24/21)   BioTel study showing predominately sinus rhythm with maximum rate of 133 bpm, minimum rate of 50 bpm, average of 62 bpm.   Atrial fibrillation: None.   Supraventricular tachycardia: None.   Pauses: None.   Heart block: None.   Ventricular tachycardia: 3 episodes with fastest run of 126 bpm of 4 beats and longest run on 18 beats.   1% burden of premature ventricular contractions and 1% burden of premature atrial contractions.   Symptoms associated with sinus tachycardia with premature ventricular contractions.      ECHOCARDIOGRAM CONCLUSIONS (11/24/21)  Prior echo 1/2/13, compared to the report of the prior study, there has   been no significant change.   Normal left ventricular systolic function.  The left ventricular ejection fraction is visually estimated to be 65%.  Mild aortic insufficiency.  Estimated right ventricular systolic pressure is 25 mmHg.  (study result reviewed)    EKG performed on (09/08/21) EKG shows sinus rhythm with left anterior fascicular block.     Laboratory results of (10/25/21) were reviewed. Cholesterol profile of 185/37/85/93 mg/dL noted.  Laboratory results of (09/22/20) Cholesterol profile of 182/44/81/92 mg/dL noted.    MYOCARDIAL PERFUSION STUDY CONCLUSIONS (09/17/21)  NUCLEAR IMAGING INTERPRETATION  Normal exercise myocardial perfusion study.  No evidence of ischemia or infarct.  Diaphragmatic artifact.  Normal LVSF with EF of 73%.  Negative stress ECG for ischemia.  Excellent exercise  tolerance. Reggie 17:59. 17.1 METs.   Rare PVC.   ECG INTERPRETATION  Negative stress ECG for ischemia.  (study result reviewed)    Assessment & Plan     1. PVC (premature ventricular contraction)     2. Essential hypertension         Medical Decision Making: Today's Assessment/Status/Plan:        1. Premature ventricular contractions: He is clinically doing well.  2. Hypertension: Blood pressure is well controlled. We will continue with losartan and amlodipine.    We will follow up the patient in one year.    CC Nimisha Huerta

## 2021-12-07 RX ORDER — LOSARTAN POTASSIUM 50 MG/1
TABLET ORAL
Qty: 90 TABLET | Refills: 3 | Status: SHIPPED
Start: 2021-12-07 | End: 2022-11-08

## 2021-12-07 RX ORDER — AMLODIPINE BESYLATE 10 MG/1
TABLET ORAL
Qty: 90 TABLET | Refills: 3 | Status: SHIPPED | OUTPATIENT
Start: 2021-12-07 | End: 2023-03-16 | Stop reason: SDUPTHER

## 2021-12-20 ENCOUNTER — APPOINTMENT (RX ONLY)
Dept: URBAN - METROPOLITAN AREA CLINIC 35 | Facility: CLINIC | Age: 66
Setting detail: DERMATOLOGY
End: 2021-12-20

## 2021-12-20 DIAGNOSIS — L82.1 OTHER SEBORRHEIC KERATOSIS: ICD-10-CM

## 2021-12-20 DIAGNOSIS — Z87.2 PERSONAL HISTORY OF DISEASES OF THE SKIN AND SUBCUTANEOUS TISSUE: ICD-10-CM

## 2021-12-20 DIAGNOSIS — Z71.89 OTHER SPECIFIED COUNSELING: ICD-10-CM

## 2021-12-20 DIAGNOSIS — Z85.828 PERSONAL HISTORY OF OTHER MALIGNANT NEOPLASM OF SKIN: ICD-10-CM

## 2021-12-20 DIAGNOSIS — D22 MELANOCYTIC NEVI: ICD-10-CM

## 2021-12-20 DIAGNOSIS — D485 NEOPLASM OF UNCERTAIN BEHAVIOR OF SKIN: ICD-10-CM

## 2021-12-20 DIAGNOSIS — L81.4 OTHER MELANIN HYPERPIGMENTATION: ICD-10-CM

## 2021-12-20 PROBLEM — D22.5 MELANOCYTIC NEVI OF TRUNK: Status: ACTIVE | Noted: 2021-12-20

## 2021-12-20 PROBLEM — D48.5 NEOPLASM OF UNCERTAIN BEHAVIOR OF SKIN: Status: ACTIVE | Noted: 2021-12-20

## 2021-12-20 PROCEDURE — ? COUNSELING

## 2021-12-20 PROCEDURE — 99214 OFFICE O/P EST MOD 30 MIN: CPT | Mod: 25

## 2021-12-20 PROCEDURE — ? SUNSCREEN RECOMMENDATIONS

## 2021-12-20 PROCEDURE — 11103 TANGNTL BX SKIN EA SEP/ADDL: CPT

## 2021-12-20 PROCEDURE — ? SURGICAL DECISION MAKING

## 2021-12-20 PROCEDURE — 11102 TANGNTL BX SKIN SINGLE LES: CPT

## 2021-12-20 PROCEDURE — ? BIOPSY BY SHAVE METHOD

## 2021-12-20 PROCEDURE — ? SEPARATE AND IDENTIFIABLE DOCUMENTATION

## 2021-12-20 ASSESSMENT — LOCATION ZONE DERM
LOCATION ZONE: TRUNK
LOCATION ZONE: LEG
LOCATION ZONE: ARM
LOCATION ZONE: FACE

## 2021-12-20 ASSESSMENT — LOCATION SIMPLE DESCRIPTION DERM
LOCATION SIMPLE: RIGHT LOWER BACK
LOCATION SIMPLE: RIGHT LOWER LEG
LOCATION SIMPLE: RIGHT UPPER BACK
LOCATION SIMPLE: RIGHT FOREHEAD
LOCATION SIMPLE: LEFT LOWER LEG
LOCATION SIMPLE: RIGHT CALF
LOCATION SIMPLE: RIGHT FOREARM
LOCATION SIMPLE: LEFT UPPER BACK

## 2021-12-20 ASSESSMENT — LOCATION DETAILED DESCRIPTION DERM
LOCATION DETAILED: RIGHT FOREHEAD
LOCATION DETAILED: RIGHT SUPERIOR LATERAL UPPER BACK
LOCATION DETAILED: RIGHT PROXIMAL DORSAL FOREARM
LOCATION DETAILED: RIGHT DISTAL CALF
LOCATION DETAILED: LEFT INFERIOR UPPER BACK
LOCATION DETAILED: RIGHT SUPERIOR MEDIAL MIDBACK
LOCATION DETAILED: LEFT MEDIAL UPPER BACK
LOCATION DETAILED: LEFT PROXIMAL LATERAL PRETIBIAL REGION
LOCATION DETAILED: RIGHT DISTAL LATERAL PRETIBIAL REGION
LOCATION DETAILED: LEFT SUPERIOR UPPER BACK

## 2021-12-20 NOTE — PROCEDURE: BIOPSY BY SHAVE METHOD
Detail Level: Detailed
Depth Of Biopsy: dermis
Was A Bandage Applied: Yes
Size Of Lesion In Cm: 0.4
X Size Of Lesion In Cm: 0
Biopsy Type: H and E
Biopsy Method: Dermablade
Anesthesia Type: 1% lidocaine with 1:100,000 epinephrine and a 1:12 solution of 8.4% sodium bicarbonate
Anesthesia Volume In Cc: 0.8
Hemostasis: Aluminum Chloride
Wound Care: Petrolatum
Dressing: Band-Aid
Destruction After The Procedure: No
Type Of Destruction Used: Curettage
Curettage Text: The wound bed was treated with curettage after the biopsy was performed.
Electrodesiccation And Curettage Text: The wound bed was treated with electrodesiccation and curettage after the biopsy was
Lab: [id number]
Lab Facility: 
Consent: Written consent was obtained and risks were reviewed including but not limited to scarring, infection, bleeding, scabbing, incomplete removal, nerve damage and allergy to anesthesia.
Post-Care Instructions: I reviewed with the patient in detail post-care instructions. Patient is to keep the biopsy site dry overnight, and then apply white petrolatum twice daily until healed. Patient may apply hydrogen peroxide soaks to remove any crusting.
Notification Instructions: Patient will be notified of biopsy results. However, patient instructed to call the office if not contacted within 2 weeks.
Billing Type: Third-Party Bill
Information: Selecting Yes will display possible errors in your note based on the variables you have selected. This validation is only offered as a suggestion for you. PLEASE NOTE THAT THE VALIDATION TEXT WILL BE REMOVED WHEN YOU FINALIZE YOUR NOTE. IF YOU WANT TO FAX A PRELIMINARY NOTE YOU WILL NEED TO TOGGLE THIS TO 'NO' IF YOU DO NOT WANT IT IN YOUR FAXED NOTE.
Size Of Lesion In Cm: 1.2
Anesthesia Volume In Cc: 1
Hemostasis: Aluminum Chloride and Electrocautery
Dressing: pressure dressing

## 2021-12-20 NOTE — PROCEDURE: SURGICAL DECISION MAKING
Date Of Surgery - Today Or Tomorrow?: today
Identified Risk Factors Documented?: yes
Risk Assessment Explanation (Does Not Render In The Note): Clinical determination of the probability and/or consequences of an event, such as surgery. Clinical assessment of the level of risk is affected by the nature of the event under consideration for the patient. Modifier 57 is used to indicate an Evaluation and Management (E/M) service resulted in the initial decision to perform surgery either the day before a major surgery (90 day global) or the day of a major surgery.
Complexity (Necessary For Coding; Major - 90 Day Global With Some Exceptions; Minor - 10 Day Global): minor
Discussion: We discussed not only the risks of the procedure but also the likely feldman that further treatment will be required to treat lesion. This could involve another visit here to destroy or excise  lesion, a visit to a Mohs or general or plastic surgeon, scarring, limited  activity, cosmetic imperfections.

## 2022-01-03 ENCOUNTER — OFFICE VISIT (OUTPATIENT)
Dept: INTERNAL MEDICINE | Facility: IMAGING CENTER | Age: 67
End: 2022-01-03
Payer: MEDICARE

## 2022-01-03 VITALS
HEART RATE: 59 BPM | RESPIRATION RATE: 14 BRPM | DIASTOLIC BLOOD PRESSURE: 72 MMHG | OXYGEN SATURATION: 97 % | BODY MASS INDEX: 25.58 KG/M2 | SYSTOLIC BLOOD PRESSURE: 126 MMHG | HEIGHT: 73 IN | TEMPERATURE: 97.6 F | WEIGHT: 193 LBS

## 2022-01-03 DIAGNOSIS — I47.29 NSVT (NONSUSTAINED VENTRICULAR TACHYCARDIA) (HCC): ICD-10-CM

## 2022-01-03 DIAGNOSIS — I10 ESSENTIAL HYPERTENSION: Chronic | ICD-10-CM

## 2022-01-03 PROCEDURE — 99213 OFFICE O/P EST LOW 20 MIN: CPT | Performed by: FAMILY MEDICINE

## 2022-01-03 RX ORDER — HYDROCHLOROTHIAZIDE 25 MG/1
25 TABLET ORAL DAILY
Qty: 30 TABLET | Refills: 0 | Status: SHIPPED | OUTPATIENT
Start: 2022-01-03 | End: 2022-01-18 | Stop reason: SDUPTHER

## 2022-01-03 ASSESSMENT — PATIENT HEALTH QUESTIONNAIRE - PHQ9: CLINICAL INTERPRETATION OF PHQ2 SCORE: 0

## 2022-01-03 ASSESSMENT — FIBROSIS 4 INDEX: FIB4 SCORE: 1.079365079365079365

## 2022-01-03 NOTE — PROGRESS NOTES
"Chief Complaint   Patient presents with   • Medication Management       Subjective:     HPI:   Evan Wright is a 66 y.o. male here  to discuss the evaluation and management of:         Problem   Nsvt (Nonsustained Ventricular Tachycardia) (Hcc)    Asymptomatic currently     Essential Hypertension    Since age 14  Blood pressure has been very well controlled on his current regimen of amlodipine 10 mg and losartan 50  He is interested in stopping the losartan due to persistent cough and sinus issues as well as tinnitus  He exercises regularly            Objective:     /72   Pulse (!) 59   Temp 36.4 °C (97.6 °F) (Temporal)   Resp 14   Ht 1.854 m (6' 0.99\")   Wt 87.5 kg (193 lb)   SpO2 97%  Body mass index is 25.47 kg/m².    Physical Exam:  Physical Exam  Constitutional: Well-developed and well-nourished. Not diaphoretic. No distress.   Skin: Skin is warm and dry. No rash noted.  Head: Atraumatic without lesions.  Eyes: Conjunctivae and extraocular motions are normal.   Ears:  External ears unremarkable.    Nose: Nares patent. Mucosa without edema or erythema. No discharge. No facial tenderness.     Neck: Supple, No thyromegaly present. No JVD  Cardiovascular: Regular rate and rhythm.   Chest: Effort normal. Clear to auscultation throughout. No adventitious sounds.   Abdomen:  without distention.  .  Extremities: No cyanosis, clubbing, erythema, nor edema.   Neurological: Alert and oriented x 3.    Psychiatric:  Behavior, mood, and affect are appropriate     Assessment and Plan:     The following treatment plan was discussed:     Essential hypertension  DC losartan due to potential side effects  Start a trial of hydrochlorothiazide 25 mg every morning  Take amlodipine 10 mg in the evenings  Patient will monitor blood pressure at home over the next 2 to 3 weeks and contact me via [facility] with results    NSVT (nonsustained ventricular tachycardia) (HCC)  Saw his cardiologist last month, no med changes " indicated continue with annual follow-up               Any change or worsening of signs or symptoms, patient encouraged to follow-up or report to emergency room for further evaluation. Patient verbalizes understanding and agrees.    Follow-Up: No follow-ups on file.      PLEASE NOTE: This dictation was created using voice recognition software. I have made every reasonable attempt to correct obvious errors, but I expect that there are errors of grammar and possibly content that I did not discover before finalizing the note.    My total time spent caring for the patient on the day of the encounter was  greater than 20 minutes.   This includes obtaining history, reviewing chart, physical exam, patient education, reviewing outside records, placing orders, interpreting tests and coordinating care.

## 2022-01-03 NOTE — ASSESSMENT & PLAN NOTE
DC losartan due to potential side effects  Start a trial of hydrochlorothiazide 25 mg every morning  Take amlodipine 10 mg in the evenings  Patient will monitor blood pressure at home over the next 2 to 3 weeks and contact me via MyChart with results

## 2022-01-18 RX ORDER — HYDROCHLOROTHIAZIDE 25 MG/1
25 TABLET ORAL DAILY
Qty: 90 TABLET | Refills: 3 | Status: SHIPPED | OUTPATIENT
Start: 2022-01-18 | End: 2023-01-23

## 2022-02-09 ENCOUNTER — APPOINTMENT (RX ONLY)
Dept: URBAN - METROPOLITAN AREA CLINIC 35 | Facility: CLINIC | Age: 67
Setting detail: DERMATOLOGY
End: 2022-02-09

## 2022-02-09 DIAGNOSIS — D485 NEOPLASM OF UNCERTAIN BEHAVIOR OF SKIN: ICD-10-CM

## 2022-02-09 DIAGNOSIS — L57.0 ACTINIC KERATOSIS: ICD-10-CM

## 2022-02-09 DIAGNOSIS — Z71.89 OTHER SPECIFIED COUNSELING: ICD-10-CM

## 2022-02-09 PROBLEM — D48.5 NEOPLASM OF UNCERTAIN BEHAVIOR OF SKIN: Status: ACTIVE | Noted: 2022-02-09

## 2022-02-09 PROBLEM — C44.729 SQUAMOUS CELL CARCINOMA OF SKIN OF LEFT LOWER LIMB, INCLUDING HIP: Status: ACTIVE | Noted: 2022-02-09

## 2022-02-09 PROBLEM — C44.722 SQUAMOUS CELL CARCINOMA OF SKIN OF RIGHT LOWER LIMB, INCLUDING HIP: Status: ACTIVE | Noted: 2022-02-09

## 2022-02-09 PROCEDURE — ? CURETTAGE AND DESTRUCTION

## 2022-02-09 PROCEDURE — ? PRESCRIPTION

## 2022-02-09 PROCEDURE — ? DEFER

## 2022-02-09 PROCEDURE — ? COUNSELING

## 2022-02-09 PROCEDURE — ? ADDITIONAL NOTES

## 2022-02-09 PROCEDURE — 17262 DSTRJ MAL LES T/A/L 1.1-2.0: CPT

## 2022-02-09 PROCEDURE — 99213 OFFICE O/P EST LOW 20 MIN: CPT | Mod: 25

## 2022-02-09 PROCEDURE — ? SUNSCREEN RECOMMENDATIONS

## 2022-02-09 PROCEDURE — ? BIOPSY BY SHAVE METHOD

## 2022-02-09 PROCEDURE — 11102 TANGNTL BX SKIN SINGLE LES: CPT | Mod: 59

## 2022-02-09 PROCEDURE — ? ORDER FOR PHOTODYNAMIC THERAPY

## 2022-02-09 RX ORDER — FLUOROURACIL 40 MG/G
1 CREAM TOPICAL
Qty: 1 | Refills: 2 | Status: ERX

## 2022-02-09 ASSESSMENT — LOCATION DETAILED DESCRIPTION DERM: LOCATION DETAILED: RIGHT LATERAL PROXIMAL CALF

## 2022-02-09 ASSESSMENT — LOCATION ZONE DERM: LOCATION ZONE: LEG

## 2022-02-09 ASSESSMENT — LOCATION SIMPLE DESCRIPTION DERM: LOCATION SIMPLE: RIGHT LOWER LEG

## 2022-02-09 NOTE — PROCEDURE: ORDER FOR PHOTODYNAMIC THERAPY
Hands Incubation Time: 2 Hours
Face And Neck Incubation Time: 1 Hour
Photosensitizer: Levulan
Location: Legs
Consent: The procedure and risks were reviewed with the patient including but not limited to: burning, pigmentary changes, pain, blistering, scabbing, redness, and the possibility of needing numerous treatments. Strict photoprotection after the procedure was also discussed.
Face And Scalp Incubation Time: 1 Hour for the face and 2 Hours for the scalp
Occlusion: No
Detail Level: Zone
Frequency Of Pdt: Every 6 months if needed
Face And Ears Incubation Time: 1.5 Hour
Face Incubation Time: 1:30:00
Pdt Type: Blue Light
Face, Ears And  Scalp Incubation Time: 1.5 to 2 hours

## 2022-02-09 NOTE — PROCEDURE: ADDITIONAL NOTES
Detail Level: Zone
Additional Notes: Did not ED&C did not see anything to treat today
Render Risk Assessment In Note?: yes

## 2022-02-09 NOTE — PROCEDURE: BIOPSY BY SHAVE METHOD
Detail Level: Detailed
Depth Of Biopsy: dermis
Was A Bandage Applied: Yes
Size Of Lesion In Cm: 0
Biopsy Type: H and E
Biopsy Method: Dermablade
Anesthesia Type: 1% lidocaine with 1:100,000 epinephrine and a 1:12 solution of 8.4% sodium bicarbonate
Anesthesia Volume In Cc: 1
Hemostasis: Aluminum Chloride
Wound Care: Petrolatum
Dressing: Band-Aid
Type Of Destruction Used: Curettage
Curettage Text: The wound bed was treated with curettage after the biopsy was performed.
Electrodesiccation And Curettage Text: The wound bed was treated with electrodesiccation and curettage after the biopsy was
Lab: [id number]
Lab Facility: 
Consent: Written consent was obtained and risks were reviewed including but not limited to scarring, infection, bleeding, scabbing, incomplete removal, nerve damage and allergy to anesthesia.
Post-Care Instructions: I reviewed with the patient in detail post-care instructions. Patient is to keep the biopsy site dry overnight, and then apply white petrolatum twice daily until healed. Patient may apply hydrogen peroxide soaks to remove any crusting.
Notification Instructions: Patient will be notified of biopsy results. However, patient instructed to call the office if not contacted within 2 weeks.
Billing Type: Third-Party Bill
Bill For Surgical Tray: no
Information: Selecting Yes will display possible errors in your note based on the variables you have selected. This validation is only offered as a suggestion for you. PLEASE NOTE THAT THE VALIDATION TEXT WILL BE REMOVED WHEN YOU FINALIZE YOUR NOTE. IF YOU WANT TO FAX A PRELIMINARY NOTE YOU WILL NEED TO TOGGLE THIS TO 'NO' IF YOU DO NOT WANT IT IN YOUR FAXED NOTE.

## 2022-02-09 NOTE — PROCEDURE: CURETTAGE AND DESTRUCTION
Detail Level: Detailed
Biopsy Photograph Reviewed: Yes
Number Of Curettages: 3
Size Of Lesion In Cm: 1.2
Size Of Lesion After Curettage: 1.7
Add Intralesional Injection: No
Concentration (Mg/Ml Or Millions Of Plaque Forming Units/Cc): 0.01
Total Volume (Ccs): 1
Anesthesia Type: 1% lidocaine with epinephrine and a 1:10 solution of 8.4% sodium bicarbonate
Anesthesia Volume In Cc: 1.5
Cautery Type: electrocautery (heat cautery)
What Was Performed First?: Curettage
Final Size Statement: The size of the lesion after curettage was
Additional Information: (Optional): The wound was cleaned, and a bandaid was applied.  The patient received detailed post-op instructions.
Consent was obtained from the patient. The risks, benefits and alternatives to therapy were discussed in detail. Specifically, the risks of infection, scarring, bleeding, prolonged wound healing, nerve injury, incomplete removal, allergy to anesthesia and recurrence were addressed. Alternatives to ED&C, such as: surgical removal and XRT were also discussed.  Prior to the procedure, the treatment site was clearly identified and confirmed by the patient. All components of Universal Protocol/PAUSE Rule completed.
Post-Care Instructions: I reviewed with the patient in detail post-care instructions. Patient is to keep the area dry for 24 hours, and not to engage in any swimming until the area is healed. Should the patient develop any fevers, chills, bleeding, severe pain patient will contact the office immediately.
Bill As A Line Item Or As Units: Line Item

## 2022-03-02 ENCOUNTER — APPOINTMENT (RX ONLY)
Dept: URBAN - METROPOLITAN AREA CLINIC 35 | Facility: CLINIC | Age: 67
Setting detail: DERMATOLOGY
End: 2022-03-02

## 2022-03-02 DIAGNOSIS — R79.89 LOW TESTOSTERONE LEVEL IN MALE: ICD-10-CM

## 2022-03-02 DIAGNOSIS — Z71.89 OTHER SPECIFIED COUNSELING: ICD-10-CM

## 2022-03-02 DIAGNOSIS — D485 NEOPLASM OF UNCERTAIN BEHAVIOR OF SKIN: ICD-10-CM

## 2022-03-02 PROBLEM — D48.5 NEOPLASM OF UNCERTAIN BEHAVIOR OF SKIN: Status: ACTIVE | Noted: 2022-03-02

## 2022-03-02 PROCEDURE — 12031 INTMD RPR S/A/T/EXT 2.5 CM/<: CPT

## 2022-03-02 PROCEDURE — ? COUNSELING

## 2022-03-02 PROCEDURE — 99212 OFFICE O/P EST SF 10 MIN: CPT | Mod: 25

## 2022-03-02 PROCEDURE — ? SUNSCREEN RECOMMENDATIONS

## 2022-03-02 PROCEDURE — ? PUNCH EXCISION

## 2022-03-02 PROCEDURE — 11402 EXC TR-EXT B9+MARG 1.1-2 CM: CPT

## 2022-03-02 ASSESSMENT — LOCATION ZONE DERM: LOCATION ZONE: ARM

## 2022-03-02 ASSESSMENT — LOCATION DETAILED DESCRIPTION DERM: LOCATION DETAILED: RIGHT VENTRAL MEDIAL DISTAL FOREARM

## 2022-03-02 ASSESSMENT — LOCATION SIMPLE DESCRIPTION DERM: LOCATION SIMPLE: RIGHT FOREARM

## 2022-03-02 NOTE — PROCEDURE: PUNCH EXCISION
Medical Necessity Clause: This procedure was medically necessary because the lesion that was treated was:
Detail Level: Detailed
Size Of Lesion (*Required): 1.2
Size Of Margin In Cm: 0
Punch Size In Mm: 6
Repair Type: Intermediate
Intermediate / Complex Repair - Final Wound Length In Cm: 1.4
Complex Requirements: Extensive Undermining Performed?: No
Undermining Type: Entire Wound
Debridement Text: The wound edges were debrided prior to proceeding with the closure to facilitate wound healing.
Helical Rim Text: The closure involved the helical rim.
Vermilion Border Text: The closure involved the vermilion border.
Nostril Rim Text: The closure involved the nostril rim.
Retention Suture Text: Retention sutures were placed to support the closure and prevent dehiscence.
Anesthesia Type: 1% lidocaine with epinephrine and a 1:10 solution of 8.4% sodium bicarbonate
Anesthesia Volume In Cc: 3
Hemostasis: None
Deep Sutures: 3-0 Vicryl
Number Of Dermal Sutures (Optional): 1
Epidermal Sutures: 4-0 Nylon
Epidermal Closure: running subcuticular
Wound Care: Petrolatum
Wound Dressings: a pressure dressing
Suture Removal: 14 days
Lab: [id number]
Path Notes (To The Dermatopathologist): .
1.5 Mm Punch Excision Text: A 1.5 mm punch biopsy was used to excise the lesion to the level of the subcutaneous fat.  Blunt dissection was used to free the lesion from the surrounding tissues and the lesion was removed.
2 Mm Punch Excision Text: A 2 mm punch biopsy was used to excise the lesion to the level of the subcutaneous fat.  Blunt dissection was used to free the lesion from the surrounding tissues and the lesion was removed.
2.5 Mm Punch Excision Text: A 2.5 mm punch biopsy was used to excise the lesion to the level of the subcutaneous fat.  Blunt dissection was used to free the lesion from the surrounding tissues and the lesion was removed.
3 Mm Punch Excision Text: A 3 mm punch biopsy was used to excise the lesion to the level of the subcutaneous fat.  Blunt dissection was used to free the lesion from the surrounding tissues and the lesion was removed.
3.5 Mm Punch Excision Text: A 3.5 mm punch biopsy was used to excise the lesion to the level of the subcutaneous fat.  Blunt dissection was used to free the lesion from the surrounding tissues and the lesion was removed.
4 Mm Punch Excision Text: A 4 mm punch biopsy was used to excise the lesion to the level of the subcutaneous fat.  Blunt dissection was used to free the lesion from the surrounding tissues and the lesion was removed.
4.5 Mm Punch Excision Text: A 4.5 mm punch biopsy was used to excise the lesion to the level of the subcutaneous fat.  Blunt dissection was used to free the lesion from the surrounding tissues and the lesion was removed.
5 Mm Punch Excision Text: A 5 mm punch biopsy was used to excise the lesion to the level of the subcutaneous fat.  Blunt dissection was used to free the lesion from the surrounding tissues and the lesion was removed.
6 Mm Punch Excision Text: A 6 mm punch biopsy was used to excise the lesion to the level of the subcutaneous fat.  Blunt dissection was used to free the lesion from the surrounding tissues and the lesion was removed.
7 Mm Punch Excision Text: A 7 mm punch biopsy was used to excise the lesion to the level of the subcutaneous fat.  Blunt dissection was used to free the lesion from the surrounding tissues and the lesion was removed.
8 Mm Punch Excision Text: A 8 mm punch biopsy was used to excise the lesion to the level of the subcutaneous fat.  Blunt dissection was used to free the lesion from the surrounding tissues and the lesion was removed.
10 Mm Punch Excision Text: A 10 mm punch biopsy was used to excise the lesion to the level of the subcutaneous fat.  Blunt dissection was used to free the lesion from the surrounding tissues and the lesion was removed.
12 Mm Punch Excision Text: A 12 mm punch biopsy was used to excise the lesion to the level of the subcutaneous fat.  Blunt dissection was used to free the lesion from the surrounding tissues and the lesion was removed.
Consent was obtained from the patient. The risks and benefits to therapy were discussed in detail. Specifically, the risks of infection, scarring, bleeding, prolonged wound healing, incomplete removal, allergy to anesthesia, nerve injury and recurrence were addressed. Prior to the procedure, the treatment site was clearly identified and confirmed by the patient. All components of Universal Protocol/PAUSE Rule completed.
Post-Care Instructions: I reviewed with the patient in detail post-care instructions. Patient is to keep the biopsy site dry overnight, and then apply bacitracin twice daily until healed. Patient may apply hydrogen peroxide soaks to remove any crusting.
Notification Instructions: Patient will be notified of biopsy results. However, patient instructed to call the office if not contacted within 2 weeks.
Billing Type: Third-Party Bill

## 2022-03-02 NOTE — PROCEDURE: MIPS QUALITY
Quality 130: Documentation Of Current Medications In The Medical Record: Current Medications Documented
Quality 226: Preventive Care And Screening: Tobacco Use: Screening And Cessation Intervention: Patient screened for tobacco use and is an ex/non-smoker
Quality 111:Pneumonia Vaccination Status For Older Adults: Pneumococcal Vaccination Previously Received
Detail Level: Detailed
Quality 402: Tobacco Use And Help With Quitting Among Adolescents: Patient screened for tobacco and never smoked

## 2022-03-03 RX ORDER — TESTOSTERONE 20.25 MG/1.25G
GEL TOPICAL
Qty: 75 G | Refills: 2 | Status: SHIPPED | OUTPATIENT
Start: 2022-03-03 | End: 2022-09-27

## 2022-03-08 ENCOUNTER — APPOINTMENT (RX ONLY)
Dept: URBAN - METROPOLITAN AREA CLINIC 35 | Facility: CLINIC | Age: 67
Setting detail: DERMATOLOGY
End: 2022-03-08

## 2022-03-08 DIAGNOSIS — L57.0 ACTINIC KERATOSIS: ICD-10-CM

## 2022-03-08 PROCEDURE — ? PDT: BLUE

## 2022-03-08 PROCEDURE — 96573 PDT DSTR PRMLG LES PHYS/QHP: CPT

## 2022-03-08 PROCEDURE — ? COUNSELING

## 2022-03-08 ASSESSMENT — LOCATION SIMPLE DESCRIPTION DERM
LOCATION SIMPLE: RIGHT PRETIBIAL REGION
LOCATION SIMPLE: LEFT PRETIBIAL REGION

## 2022-03-08 ASSESSMENT — LOCATION DETAILED DESCRIPTION DERM
LOCATION DETAILED: RIGHT PROXIMAL PRETIBIAL REGION
LOCATION DETAILED: LEFT DISTAL PRETIBIAL REGION

## 2022-03-08 ASSESSMENT — LOCATION ZONE DERM: LOCATION ZONE: LEG

## 2022-03-08 NOTE — PROCEDURE: PDT: BLUE
Incubation Time: 2 hours
Ndc# (Optional): 23898-209-08
Consent: Written consent obtained.  The risks were reviewed with the patient including but not limited to: pigmentary changes, pain, blistering, scabbing, redness, and the remote possibility of scarring.
Show Anesthesia In Plan?: No
Which Photosensitizer Was Used: Levulan
Post-Care Instructions: I reviewed with the patient in detail post-care instructions. Patient is to avoid sunlight for the next 2 days, and wear sun protection. Patients may expect sunburn like redness, discomfort and scabbing.
Anesthesia Type: 1% lidocaine with epinephrine
Treatment Number: 1
Total Number Of Aks Treated (Optional To Report): 0
Incubation Start Time: 09:45 AM
Number Of Kerasticks/Tubes Billed For: 2
Occlusion: Yes
Incubation End Time: 11:45 AM
Medical Necessity: Precancerous Lesions
Comments: Treated greater than 15 lesions
Detail Level: Zone
Illumination Time: 00:16:40
Lot # (Optional): [id number]
Expiration Date (Optional): 2023-06
Pre-Procedure Text: The treatment areas were cleaned and prepped with Acetone in the usual fashion.
Light Source: Silvino-U
Who Performed The Pdt?: Performed by MD NAVIN, CARMEN or NP ([id number])

## 2022-03-15 ENCOUNTER — APPOINTMENT (RX ONLY)
Dept: URBAN - METROPOLITAN AREA CLINIC 35 | Facility: CLINIC | Age: 67
Setting detail: DERMATOLOGY
End: 2022-03-15

## 2022-03-15 DIAGNOSIS — Z48.02 ENCOUNTER FOR REMOVAL OF SUTURES: ICD-10-CM

## 2022-03-15 PROCEDURE — ? SUTURE REMOVAL (GLOBAL PERIOD)

## 2022-03-15 ASSESSMENT — LOCATION SIMPLE DESCRIPTION DERM: LOCATION SIMPLE: RIGHT FOREARM

## 2022-03-15 ASSESSMENT — LOCATION ZONE DERM: LOCATION ZONE: ARM

## 2022-03-15 ASSESSMENT — LOCATION DETAILED DESCRIPTION DERM: LOCATION DETAILED: RIGHT VENTRAL MEDIAL DISTAL FOREARM

## 2022-03-15 NOTE — PROCEDURE: SUTURE REMOVAL (GLOBAL PERIOD)
Detail Level: Detailed
Add 42815 Cpt? (Important Note: In 2017 The Use Of 27500 Is Being Tracked By Cms To Determine Future Global Period Reimbursement For Global Periods): no

## 2022-06-21 ENCOUNTER — APPOINTMENT (RX ONLY)
Dept: URBAN - METROPOLITAN AREA CLINIC 35 | Facility: CLINIC | Age: 67
Setting detail: DERMATOLOGY
End: 2022-06-21

## 2022-06-21 DIAGNOSIS — Z87.2 PERSONAL HISTORY OF DISEASES OF THE SKIN AND SUBCUTANEOUS TISSUE: ICD-10-CM

## 2022-06-21 DIAGNOSIS — L81.4 OTHER MELANIN HYPERPIGMENTATION: ICD-10-CM

## 2022-06-21 DIAGNOSIS — L20.89 OTHER ATOPIC DERMATITIS: ICD-10-CM

## 2022-06-21 DIAGNOSIS — L82.1 OTHER SEBORRHEIC KERATOSIS: ICD-10-CM

## 2022-06-21 DIAGNOSIS — D22 MELANOCYTIC NEVI: ICD-10-CM

## 2022-06-21 DIAGNOSIS — L57.0 ACTINIC KERATOSIS: ICD-10-CM

## 2022-06-21 DIAGNOSIS — Z85.828 PERSONAL HISTORY OF OTHER MALIGNANT NEOPLASM OF SKIN: ICD-10-CM

## 2022-06-21 DIAGNOSIS — Z71.89 OTHER SPECIFIED COUNSELING: ICD-10-CM

## 2022-06-21 PROBLEM — D48.5 NEOPLASM OF UNCERTAIN BEHAVIOR OF SKIN: Status: ACTIVE | Noted: 2022-06-21

## 2022-06-21 PROBLEM — D22.5 MELANOCYTIC NEVI OF TRUNK: Status: ACTIVE | Noted: 2022-06-21

## 2022-06-21 PROCEDURE — ? SUNSCREEN RECOMMENDATIONS

## 2022-06-21 PROCEDURE — 11102 TANGNTL BX SKIN SINGLE LES: CPT

## 2022-06-21 PROCEDURE — 17000 DESTRUCT PREMALG LESION: CPT | Mod: 59

## 2022-06-21 PROCEDURE — ? TREATMENT REGIMEN

## 2022-06-21 PROCEDURE — ? COUNSELING

## 2022-06-21 PROCEDURE — 99214 OFFICE O/P EST MOD 30 MIN: CPT | Mod: 25

## 2022-06-21 PROCEDURE — ? BIOPSY BY SHAVE METHOD

## 2022-06-21 PROCEDURE — ? PRESCRIPTION

## 2022-06-21 PROCEDURE — ? SURGICAL DECISION MAKING

## 2022-06-21 PROCEDURE — ? LIQUID NITROGEN

## 2022-06-21 PROCEDURE — ? SEPARATE AND IDENTIFIABLE DOCUMENTATION

## 2022-06-21 RX ORDER — CLOBETASOL PROPIONATE 0.5 MG/G
1 CREAM TOPICAL TWICE A DAY
Qty: 60 | Refills: 5 | Status: ERX | COMMUNITY
Start: 2022-06-21

## 2022-06-21 RX ADMIN — CLOBETASOL PROPIONATE 1: 0.5 CREAM TOPICAL at 00:00

## 2022-06-21 ASSESSMENT — LOCATION ZONE DERM
LOCATION ZONE: TRUNK
LOCATION ZONE: FACE
LOCATION ZONE: ARM
LOCATION ZONE: EAR
LOCATION ZONE: LEG

## 2022-06-21 ASSESSMENT — LOCATION SIMPLE DESCRIPTION DERM
LOCATION SIMPLE: LEFT FOREARM
LOCATION SIMPLE: RIGHT UPPER ARM
LOCATION SIMPLE: RIGHT FOREHEAD
LOCATION SIMPLE: LEFT LOWER LEG
LOCATION SIMPLE: RIGHT FOREARM
LOCATION SIMPLE: RIGHT CALF
LOCATION SIMPLE: RIGHT LOWER BACK
LOCATION SIMPLE: RIGHT UPPER BACK
LOCATION SIMPLE: LEFT UPPER BACK
LOCATION SIMPLE: RIGHT PRETIBIAL REGION
LOCATION SIMPLE: LEFT PRETIBIAL REGION
LOCATION SIMPLE: RIGHT LOWER LEG
LOCATION SIMPLE: RIGHT EAR

## 2022-06-21 ASSESSMENT — LOCATION DETAILED DESCRIPTION DERM
LOCATION DETAILED: LEFT MEDIAL UPPER BACK
LOCATION DETAILED: RIGHT LATERAL PROXIMAL CALF
LOCATION DETAILED: LEFT PROXIMAL DORSAL FOREARM
LOCATION DETAILED: RIGHT PROXIMAL POSTERIOR UPPER ARM
LOCATION DETAILED: RIGHT DISTAL PRETIBIAL REGION
LOCATION DETAILED: RIGHT DISTAL LATERAL PRETIBIAL REGION
LOCATION DETAILED: LEFT PROXIMAL LATERAL PRETIBIAL REGION
LOCATION DETAILED: LEFT INFERIOR UPPER BACK
LOCATION DETAILED: RIGHT FOREHEAD
LOCATION DETAILED: RIGHT SUPERIOR LATERAL UPPER BACK
LOCATION DETAILED: LEFT MID-UPPER BACK
LOCATION DETAILED: RIGHT DISTAL CALF
LOCATION DETAILED: RIGHT SUPERIOR MEDIAL MIDBACK
LOCATION DETAILED: RIGHT PROXIMAL DORSAL FOREARM
LOCATION DETAILED: RIGHT SUPERIOR HELIX
LOCATION DETAILED: LEFT SUPERIOR UPPER BACK
LOCATION DETAILED: LEFT PROXIMAL PRETIBIAL REGION

## 2022-06-21 NOTE — PROCEDURE: BIOPSY BY SHAVE METHOD
Detail Level: Detailed
Depth Of Biopsy: dermis
Was A Bandage Applied: Yes
Size Of Lesion In Cm: 0.3
X Size Of Lesion In Cm: 0
Biopsy Type: H and E
Biopsy Method: Personna blade
Anesthesia Type: 0.5% lidocaine without epinephrine
Anesthesia Volume In Cc: 1.2
Hemostasis: Aluminum Chloride
Wound Care: Petrolatum
Dressing: pressure dressing
Destruction After The Procedure: No
Type Of Destruction Used: Electrodesiccation and Curettage
Curettage Text: The wound bed was treated with curettage after the biopsy was performed.
Electrodesiccation And Curettage Text: The wound bed was treated with electrodesiccation and curettage after the biopsy was
Lab: [id number]
Lab Facility: 
Consent: Written consent was obtained and risks were reviewed including but not limited to scarring, infection, bleeding, scabbing, incomplete removal, nerve damage and allergy to anesthesia.
Post-Care Instructions: I reviewed with the patient in detail post-care instructions. Patient is to keep the biopsy site dry overnight, and then apply white petrolatum twice daily until healed. Patient may apply hydrogen peroxide soaks to remove any crusting.
Notification Instructions: Patient will be notified of biopsy results. However, patient instructed to call the office if not contacted within 2 weeks.
Billing Type: Third-Party Bill
Information: Selecting Yes will display possible errors in your note based on the variables you have selected. This validation is only offered as a suggestion for you. PLEASE NOTE THAT THE VALIDATION TEXT WILL BE REMOVED WHEN YOU FINALIZE YOUR NOTE. IF YOU WANT TO FAX A PRELIMINARY NOTE YOU WILL NEED TO TOGGLE THIS TO 'NO' IF YOU DO NOT WANT IT IN YOUR FAXED NOTE.

## 2022-06-21 NOTE — PROCEDURE: SURGICAL DECISION MAKING
Discussion: We discussed not only the risks of the procedure but also the likely feldman that further treatment will be required to treat lesion. This could involve another visit here to destroy or excise  lesion, a visit to a Mohs or general or plastic surgeon, scarring, limited  activity, cosmetic imperfections.
Risk Assessment Explanation (Does Not Render In The Note): Clinical determination of the probability and/or consequences of an event, such as surgery. Clinical assessment of the level of risk is affected by the nature of the event under consideration for the patient. Modifier 57 is used to indicate an Evaluation and Management (E/M) service resulted in the initial decision to perform surgery either the day before a major surgery (90 day global) or the day of a major surgery.
Initial Decision For Surgery?: No
Date Of Surgery - Today Or Tomorrow?: today
Identified Risk Factors Documented?: yes
Complexity (Necessary For Coding; Major - 90 Day Global With Some Exceptions; Minor - 10 Day Global): minor

## 2022-06-21 NOTE — PROCEDURE: LIQUID NITROGEN
Post-Care Instructions: I reviewed with the patient in detail post-care instructions. Patient is to wear sunprotection, and avoid picking at any of the treated lesions. Pt may apply Vaseline to crusted or scabbing areas.
Consent: The patient's consent was obtained including but not limited to risks of crusting, scabbing, blistering, scarring, darker or lighter pigmentary change, recurrence, incomplete removal and infection.
Detail Level: Detailed
Duration Of Freeze Thaw-Cycle (Seconds): 2
Render Note In Bullet Format When Appropriate: No
Show Applicator Variable?: Yes
Number Of Freeze-Thaw Cycles: 2 freeze-thaw cycles

## 2022-06-21 NOTE — PROCEDURE: TREATMENT REGIMEN
Plan: PAR for Dupixent
Continue Regimen: clobetasol 0.05 % topical cream Apply to affected skin twice a day alternating 2 weeks on 1 week off as needed Never to face groin or under arms
Detail Level: Detailed

## 2022-07-15 ENCOUNTER — APPOINTMENT (RX ONLY)
Dept: URBAN - METROPOLITAN AREA CLINIC 35 | Facility: CLINIC | Age: 67
Setting detail: DERMATOLOGY
End: 2022-07-15

## 2022-07-15 PROBLEM — C44.529 SQUAMOUS CELL CARCINOMA OF SKIN OF OTHER PART OF TRUNK: Status: ACTIVE | Noted: 2022-07-15

## 2022-07-15 PROCEDURE — 11602 EXC TR-EXT MAL+MARG 1.1-2 CM: CPT

## 2022-07-15 PROCEDURE — ? PUNCH EXCISION

## 2022-07-15 NOTE — PROCEDURE: MIPS QUALITY
Quality 130: Documentation Of Current Medications In The Medical Record: Current Medications Documented
Quality 226: Preventive Care And Screening: Tobacco Use: Screening And Cessation Intervention: Patient screened for tobacco use and is an ex/non-smoker
Quality 111:Pneumonia Vaccination Status For Older Adults: Pneumococcal vaccine administered on or after patient’s 60th birthday and before the end of the measurement period
Detail Level: Detailed
Quality 402: Tobacco Use And Help With Quitting Among Adolescents: Patient screened for tobacco and never smoked

## 2022-07-25 ENCOUNTER — PATIENT MESSAGE (OUTPATIENT)
Dept: INTERNAL MEDICINE | Facility: IMAGING CENTER | Age: 67
End: 2022-07-25
Payer: MEDICARE

## 2022-07-28 ENCOUNTER — APPOINTMENT (RX ONLY)
Dept: URBAN - METROPOLITAN AREA CLINIC 35 | Facility: CLINIC | Age: 67
Setting detail: DERMATOLOGY
End: 2022-07-28

## 2022-07-28 DIAGNOSIS — Z48.02 ENCOUNTER FOR REMOVAL OF SUTURES: ICD-10-CM

## 2022-07-28 PROCEDURE — ? SUTURE REMOVAL (GLOBAL PERIOD)

## 2022-07-28 ASSESSMENT — LOCATION DETAILED DESCRIPTION DERM: LOCATION DETAILED: LEFT LATERAL INFERIOR CHEST

## 2022-07-28 ASSESSMENT — LOCATION SIMPLE DESCRIPTION DERM: LOCATION SIMPLE: CHEST

## 2022-07-28 ASSESSMENT — LOCATION ZONE DERM: LOCATION ZONE: TRUNK

## 2022-08-19 ENCOUNTER — RX ONLY (OUTPATIENT)
Age: 67
Setting detail: RX ONLY
End: 2022-08-19

## 2022-08-19 RX ORDER — DUPILUMAB 300 MG/2ML
2 INJECTION, SOLUTION SUBCUTANEOUS
Qty: 2 | Refills: 6 | Status: ERX

## 2022-09-23 NOTE — PROCEDURE: MIPS QUALITY
----- Message from Cecilio Larson sent at 9/23/2022  1:17 PM EDT -----  Subject: Message to Provider    QUESTIONS  Information for Provider?  Please shaina patient back  ---------------------------------------------------------------------------  --------------  Matthew RINCON  [phone]; OK to leave message on voicemail  ---------------------------------------------------------------------------  --------------  SCRIPT ANSWERS  undefined
Quality 130: Documentation Of Current Medications In The Medical Record: Current Medications Documented
Quality 226: Preventive Care And Screening: Tobacco Use: Screening And Cessation Intervention: Patient screened for tobacco use and is an ex/non-smoker
Quality 111:Pneumonia Vaccination Status For Older Adults: Pneumococcal Vaccination Previously Received
Detail Level: Detailed
Quality 402: Tobacco Use And Help With Quitting Among Adolescents: Patient screened for tobacco and never smoked

## 2022-09-27 DIAGNOSIS — R79.89 LOW TESTOSTERONE LEVEL IN MALE: ICD-10-CM

## 2022-09-27 RX ORDER — DUPILUMAB 300 MG/2ML
INJECTION, SOLUTION SUBCUTANEOUS
COMMUNITY
Start: 2022-09-26 | End: 2023-09-29

## 2022-09-27 RX ORDER — TESTOSTERONE 20.25 MG/1.25G
GEL TOPICAL
Qty: 75 G | Refills: 3 | Status: SHIPPED | OUTPATIENT
Start: 2022-09-27 | End: 2023-05-08

## 2022-11-03 DIAGNOSIS — Z79.899 MEDICATION MANAGEMENT: ICD-10-CM

## 2022-11-03 DIAGNOSIS — I10 ESSENTIAL HYPERTENSION: ICD-10-CM

## 2022-11-03 DIAGNOSIS — R79.89 LOW TESTOSTERONE LEVEL IN MALE: ICD-10-CM

## 2022-11-03 DIAGNOSIS — Z12.5 PROSTATE CANCER SCREENING: ICD-10-CM

## 2022-11-07 ENCOUNTER — NON-PROVIDER VISIT (OUTPATIENT)
Dept: INTERNAL MEDICINE | Facility: IMAGING CENTER | Age: 67
End: 2022-11-07
Payer: MEDICARE

## 2022-11-07 ENCOUNTER — HOSPITAL ENCOUNTER (OUTPATIENT)
Facility: MEDICAL CENTER | Age: 67
End: 2022-11-07
Attending: FAMILY MEDICINE
Payer: MEDICARE

## 2022-11-07 DIAGNOSIS — I10 ESSENTIAL HYPERTENSION: ICD-10-CM

## 2022-11-07 DIAGNOSIS — Z79.899 MEDICATION MANAGEMENT: ICD-10-CM

## 2022-11-07 DIAGNOSIS — R79.89 LOW TESTOSTERONE LEVEL IN MALE: ICD-10-CM

## 2022-11-07 DIAGNOSIS — Z12.5 PROSTATE CANCER SCREENING: ICD-10-CM

## 2022-11-07 LAB
25(OH)D3 SERPL-MCNC: 102 NG/ML (ref 30–100)
ALBUMIN SERPL BCP-MCNC: 4.9 G/DL (ref 3.2–4.9)
ALBUMIN/GLOB SERPL: 2.3 G/DL
ALP SERPL-CCNC: 63 U/L (ref 30–99)
ALT SERPL-CCNC: 33 U/L (ref 2–50)
ANION GAP SERPL CALC-SCNC: 12 MMOL/L (ref 7–16)
AST SERPL-CCNC: 26 U/L (ref 12–45)
BILIRUB SERPL-MCNC: 1 MG/DL (ref 0.1–1.5)
BUN SERPL-MCNC: 18 MG/DL (ref 8–22)
CALCIUM SERPL-MCNC: 9.9 MG/DL (ref 8.5–10.5)
CHLORIDE SERPL-SCNC: 101 MMOL/L (ref 96–112)
CHOLEST SERPL-MCNC: 205 MG/DL (ref 100–199)
CO2 SERPL-SCNC: 27 MMOL/L (ref 20–33)
CREAT SERPL-MCNC: 1.06 MG/DL (ref 0.5–1.4)
ERYTHROCYTE [DISTWIDTH] IN BLOOD BY AUTOMATED COUNT: 43.8 FL (ref 35.9–50)
GFR SERPLBLD CREATININE-BSD FMLA CKD-EPI: 76 ML/MIN/1.73 M 2
GLOBULIN SER CALC-MCNC: 2.1 G/DL (ref 1.9–3.5)
GLUCOSE SERPL-MCNC: 89 MG/DL (ref 65–99)
HCT VFR BLD AUTO: 53.8 % (ref 42–52)
HDLC SERPL-MCNC: 90 MG/DL
HGB BLD-MCNC: 18.3 G/DL (ref 14–18)
LDLC SERPL CALC-MCNC: 103 MG/DL
MCH RBC QN AUTO: 31.7 PG (ref 27–33)
MCHC RBC AUTO-ENTMCNC: 34 G/DL (ref 33.7–35.3)
MCV RBC AUTO: 93.1 FL (ref 81.4–97.8)
PLATELET # BLD AUTO: 250 K/UL (ref 164–446)
PMV BLD AUTO: 10.2 FL (ref 9–12.9)
POTASSIUM SERPL-SCNC: 3.9 MMOL/L (ref 3.6–5.5)
PROT SERPL-MCNC: 7 G/DL (ref 6–8.2)
PSA SERPL-MCNC: 1.65 NG/ML (ref 0–4)
RBC # BLD AUTO: 5.78 M/UL (ref 4.7–6.1)
SODIUM SERPL-SCNC: 140 MMOL/L (ref 135–145)
TRIGL SERPL-MCNC: 62 MG/DL (ref 0–149)
TSH SERPL DL<=0.005 MIU/L-ACNC: 2.61 UIU/ML (ref 0.38–5.33)
WBC # BLD AUTO: 5.7 K/UL (ref 4.8–10.8)

## 2022-11-07 PROCEDURE — 84443 ASSAY THYROID STIM HORMONE: CPT

## 2022-11-07 PROCEDURE — 82306 VITAMIN D 25 HYDROXY: CPT

## 2022-11-07 PROCEDURE — 85027 COMPLETE CBC AUTOMATED: CPT

## 2022-11-07 PROCEDURE — 84403 ASSAY OF TOTAL TESTOSTERONE: CPT

## 2022-11-07 PROCEDURE — 80061 LIPID PANEL: CPT

## 2022-11-07 PROCEDURE — 84270 ASSAY OF SEX HORMONE GLOBUL: CPT

## 2022-11-07 PROCEDURE — 84153 ASSAY OF PSA TOTAL: CPT

## 2022-11-07 PROCEDURE — 84402 ASSAY OF FREE TESTOSTERONE: CPT

## 2022-11-07 PROCEDURE — 80053 COMPREHEN METABOLIC PANEL: CPT

## 2022-11-08 ENCOUNTER — PATIENT MESSAGE (OUTPATIENT)
Dept: HEALTH INFORMATION MANAGEMENT | Facility: OTHER | Age: 67
End: 2022-11-08

## 2022-11-08 ENCOUNTER — OFFICE VISIT (OUTPATIENT)
Dept: CARDIOLOGY | Facility: MEDICAL CENTER | Age: 67
End: 2022-11-08
Payer: MEDICARE

## 2022-11-08 VITALS
BODY MASS INDEX: 25.71 KG/M2 | OXYGEN SATURATION: 94 % | WEIGHT: 194 LBS | DIASTOLIC BLOOD PRESSURE: 80 MMHG | SYSTOLIC BLOOD PRESSURE: 136 MMHG | HEIGHT: 73 IN | HEART RATE: 60 BPM | RESPIRATION RATE: 16 BRPM

## 2022-11-08 DIAGNOSIS — I10 ESSENTIAL HYPERTENSION: Chronic | ICD-10-CM

## 2022-11-08 DIAGNOSIS — I49.3 PVC (PREMATURE VENTRICULAR CONTRACTION): ICD-10-CM

## 2022-11-08 DIAGNOSIS — R00.2 PALPITATIONS: ICD-10-CM

## 2022-11-08 PROCEDURE — 99214 OFFICE O/P EST MOD 30 MIN: CPT | Performed by: INTERNAL MEDICINE

## 2022-11-08 ASSESSMENT — ENCOUNTER SYMPTOMS
FOCAL WEAKNESS: 0
DEPRESSION: 0
GASTROINTESTINAL NEGATIVE: 1
MUSCULOSKELETAL NEGATIVE: 1
BLURRED VISION: 0
PALPITATIONS: 0
RESPIRATORY NEGATIVE: 1
BRUISES/BLEEDS EASILY: 0
PSYCHIATRIC NEGATIVE: 1
CHILLS: 0
SHORTNESS OF BREATH: 0
DIZZINESS: 0
NAUSEA: 0
VOMITING: 0
NERVOUS/ANXIOUS: 0
CLAUDICATION: 0
HEADACHES: 0
DOUBLE VISION: 0
CARDIOVASCULAR NEGATIVE: 1
EYES NEGATIVE: 1
NEUROLOGICAL NEGATIVE: 1
WEIGHT LOSS: 0
WEAKNESS: 0
CONSTITUTIONAL NEGATIVE: 1
FEVER: 0
COUGH: 0
ABDOMINAL PAIN: 0
MYALGIAS: 0

## 2022-11-08 ASSESSMENT — FIBROSIS 4 INDEX: FIB4 SCORE: 1.23

## 2022-11-08 NOTE — PROGRESS NOTES
Chief Complaint   Patient presents with    Premature Ventricular Contractions (PVCs)    Arrhythmia     F/V Dx: Cardiac arrhythmia, unspecified cardiac arrhythmia type       Subjective     Evan Wright is a 66 y.o. male who presents today for annual follow up of palpitations with premature ventricular contractions.    Since the patient's last visit on 12/06/21, he has been doing well clinically. He had one episode of  He denies chest pain, shortness of breath, palpitations, nausea/vomiting or diaphoresis. He is planning to run a marathon next months.    Past Medical History:   Diagnosis Date    Hypertension     Insomnia     Testosterone insufficiency      Past Surgical History:   Procedure Laterality Date    FOOT SURGERY Left 2010    left foot neuroma     Family History   Problem Relation Age of Onset    Hypertension Mother     Cancer Father         liver CA    Alcohol/Drug Father     Hypertension Father     No Known Problems Son     No Known Problems Daughter     Heart Disease Neg Hx      Social History     Socioeconomic History    Marital status:      Spouse name: Not on file    Number of children: 2    Years of education: Not on file    Highest education level: Not on file   Occupational History    Occupation: Retired   Tobacco Use    Smoking status: Never    Smokeless tobacco: Never   Substance and Sexual Activity    Alcohol use: Yes     Comment: wine daily 5-6    Drug use: No    Sexual activity: Yes     Partners: Female   Other Topics Concern    Not on file   Social History Narrative    Retired business owner      2 grown kids     Social Determinants of Health     Financial Resource Strain: Not on file   Food Insecurity: Not on file   Transportation Needs: Not on file   Physical Activity: Not on file   Stress: Not on file   Social Connections: Not on file   Intimate Partner Violence: Not on file   Housing Stability: Not on file     No Known Allergies    (Medications reviewed.)  Outpatient  Encounter Medications as of 11/8/2022   Medication Sig Dispense Refill    Testosterone 1.62 % Gel APPLY 1 PUMP TOPICALLY ONCE DAILY AS NEEDED 75 g 3    hydroCHLOROthiazide (HYDRODIURIL) 25 MG Tab Take 1 Tablet by mouth every day. 90 Tablet 3    amLODIPine (NORVASC) 10 MG Tab TAKE ONE TABLET BY MOUTH EVERY DAY 90 Tablet 3    clobetasol (TEMOVATE) 0.05 % Cream Apply topically twice a day 45 g 2    fluticasone (FLONASE) 50 MCG/ACT nasal spray Administer 1 Spray into affected nostril(S) every day.      desonide (TRIDESILON) 0.05 % Cream       tadalafil (CIALIS) 20 MG tablet Take 1 Tab by mouth as needed for Erectile Dysfunction (Max 1 dose per 24 hrs). 10 Tab 6    Ascorbic Acid (VITAMIN C PO) Take  by mouth.      Cholecalciferol (VITAMIN D) 2000 units Cap Take 1 Capsule by mouth every day.      B Complex Vitamins (B COMPLEX 1 PO) Take 1 Tab by mouth every day.      DUPIXENT 300 MG/2ML injection       [DISCONTINUED] losartan (COZAAR) 50 MG Tab TAKE ONE TABLET BY MOUTH EVERY DAY 90 Tablet 3     No facility-administered encounter medications on file as of 11/8/2022.     Review of Systems   Constitutional: Negative.  Negative for chills, fever, malaise/fatigue and weight loss.   HENT:  Positive for tinnitus. Negative for hearing loss.    Eyes: Negative.  Negative for blurred vision and double vision.   Respiratory: Negative.  Negative for cough and shortness of breath.    Cardiovascular: Negative.  Negative for chest pain, palpitations, claudication and leg swelling.   Gastrointestinal: Negative.  Negative for abdominal pain, nausea and vomiting.   Genitourinary: Negative.  Negative for dysuria and urgency.   Musculoskeletal: Negative.  Negative for joint pain and myalgias.   Skin: Negative.  Negative for itching and rash.   Neurological: Negative.  Negative for dizziness, focal weakness, weakness and headaches.   Endo/Heme/Allergies: Negative.  Does not bruise/bleed easily.   Psychiatric/Behavioral: Negative.  Negative for  "depression. The patient is not nervous/anxious.             Objective     /80 (BP Location: Left arm, Patient Position: Sitting, BP Cuff Size: Adult)   Pulse 60   Resp 16   Ht 1.854 m (6' 0.99\")   Wt 88 kg (194 lb)   SpO2 94%   BMI 25.60 kg/m²     Physical Exam  Constitutional:       Appearance: Normal appearance. He is well-developed and normal weight.   HENT:      Head: Normocephalic and atraumatic.   Neck:      Vascular: No JVD.   Cardiovascular:      Rate and Rhythm: Normal rate and regular rhythm.      Heart sounds: Normal heart sounds.   Pulmonary:      Effort: Pulmonary effort is normal.      Breath sounds: Normal breath sounds.   Abdominal:      General: Bowel sounds are normal.      Palpations: Abdomen is soft.      Comments: No hepatosplenomegaly.   Musculoskeletal:         General: Normal range of motion.   Lymphadenopathy:      Cervical: No cervical adenopathy.   Skin:     General: Skin is warm and dry.   Neurological:      Mental Status: He is alert and oriented to person, place, and time.          CARDIAC STUDIES/PROCEDURES:     BIOTEL CONCLUSIONS (07/26/21-08/24/21)   BioTel study showing predominately sinus rhythm with maximum rate of 133 bpm, minimum rate of 50 bpm, average of 62 bpm.   Atrial fibrillation: None.   Supraventricular tachycardia: None.   Pauses: None.   Heart block: None.   Ventricular tachycardia: 3 episodes with fastest run of 126 bpm of 4 beats and longest run on 18 beats.   1% burden of premature ventricular contractions and 1% burden of premature atrial contractions.   Symptoms associated with sinus tachycardia with premature ventricular contractions.      ECHOCARDIOGRAM CONCLUSIONS (11/24/21)  Prior echo 1/2/13, compared to the report of the prior study, there has   been no significant change.   Normal left ventricular systolic function.  The left ventricular ejection fraction is visually estimated to be 65%.  Mild aortic insufficiency.  Estimated right ventricular " systolic pressure is 25 mmHg.     EKG performed on (09/08/21) EKG shows sinus rhythm with left anterior fascicular block.     Laboratory results of (10/25/21) Cholesterol profile of 185/37/85/93 mg/dL noted.  Laboratory results of (09/22/20) Cholesterol profile of 182/44/81/92 mg/dL noted.     MYOCARDIAL PERFUSION STUDY CONCLUSIONS (09/17/21)  NUCLEAR IMAGING INTERPRETATION  Normal exercise myocardial perfusion study.  No evidence of ischemia or infarct.  Diaphragmatic artifact.  Normal LVSF with EF of 73%.  Negative stress ECG for ischemia.  Excellent exercise tolerance. Reggie 17:59. 17.1 METs.   Rare PVC.   ECG INTERPRETATION  Negative stress ECG for ischemia.    Assessment & Plan     1. Palpitations        2. PVC (premature ventricular contraction)        3. Essential hypertension            Medical Decision Making: Today's Assessment/Status/Plan:        Palpitations, premature ventricular contractions: He is experiencing palpitations as described above. We will perform a Holter monitor and follow up with him.  Hypertension: Blood pressure is well controlled. We will continue with amlodipine, hydrochlorothiazide.    We will follow up the patient in one year.    CC Nimisha Huerta

## 2022-11-09 LAB
SHBG SERPL-SCNC: 49 NMOL/L (ref 19–76)
TESTOST FREE MFR SERPL: 1.5 % (ref 1.6–2.9)
TESTOST FREE SERPL-MCNC: 77 PG/ML (ref 47–244)
TESTOST SERPL-MCNC: 513 NG/DL (ref 300–720)

## 2022-11-14 ENCOUNTER — OFFICE VISIT (OUTPATIENT)
Dept: INTERNAL MEDICINE | Facility: IMAGING CENTER | Age: 67
End: 2022-11-14
Payer: MEDICARE

## 2022-11-14 VITALS
DIASTOLIC BLOOD PRESSURE: 72 MMHG | BODY MASS INDEX: 25.98 KG/M2 | HEART RATE: 56 BPM | RESPIRATION RATE: 14 BRPM | OXYGEN SATURATION: 96 % | TEMPERATURE: 97.9 F | WEIGHT: 196 LBS | SYSTOLIC BLOOD PRESSURE: 136 MMHG | HEIGHT: 73 IN

## 2022-11-14 DIAGNOSIS — E67.3 HIGH VITAMIN D LEVEL: ICD-10-CM

## 2022-11-14 DIAGNOSIS — F51.01 PRIMARY INSOMNIA: ICD-10-CM

## 2022-11-14 DIAGNOSIS — Z23 NEED FOR VACCINATION: ICD-10-CM

## 2022-11-14 DIAGNOSIS — Z00.00 MEDICARE ANNUAL WELLNESS VISIT, SUBSEQUENT: Primary | ICD-10-CM

## 2022-11-14 PROCEDURE — G0008 ADMIN INFLUENZA VIRUS VAC: HCPCS | Performed by: FAMILY MEDICINE

## 2022-11-14 PROCEDURE — G0439 PPPS, SUBSEQ VISIT: HCPCS | Performed by: FAMILY MEDICINE

## 2022-11-14 PROCEDURE — 90662 IIV NO PRSV INCREASED AG IM: CPT | Performed by: FAMILY MEDICINE

## 2022-11-14 RX ORDER — MIRTAZAPINE 7.5 MG/1
7.5 TABLET, FILM COATED ORAL NIGHTLY
Qty: 30 TABLET | Refills: 0 | Status: SHIPPED | OUTPATIENT
Start: 2022-11-14 | End: 2022-12-12

## 2022-11-14 ASSESSMENT — PATIENT HEALTH QUESTIONNAIRE - PHQ9: CLINICAL INTERPRETATION OF PHQ2 SCORE: 0

## 2022-11-14 ASSESSMENT — ENCOUNTER SYMPTOMS: GENERAL WELL-BEING: EXCELLENT

## 2022-11-14 ASSESSMENT — ACTIVITIES OF DAILY LIVING (ADL): BATHING_REQUIRES_ASSISTANCE: 0

## 2022-11-14 ASSESSMENT — FIBROSIS 4 INDEX: FIB4 SCORE: 1.23

## 2022-11-14 NOTE — PATIENT INSTRUCTIONS
Advance Directive    Advance directives are legal documents that let you make choices ahead of time about your health care and medical treatment in case you become unable to communicate for yourself. Advance directives are a way for you to communicate your wishes to family, friends, and health care providers. This can help convey your decisions about end-of-life care if you become unable to communicate.  Discussing and writing advance directives should happen over time rather than all at once. Advance directives can be changed depending on your situation and what you want, even after you have signed the advance directives.  If you do not have an advance directive, some states assign family decision makers to act on your behalf based on how closely you are related to them. Each state has its own laws regarding advance directives. You may want to check with your health care provider, , or state representative about the laws in your state. There are different types of advance directives, such as:  Medical power of .  Living will.  Do not resuscitate (DNR) or do not attempt resuscitation (DNAR) order.  Health care proxy and medical power of   A health care proxy, also called a health care agent, is a person who is appointed to make medical decisions for you in cases in which you are unable to make the decisions yourself. Generally, people choose someone they know well and trust to represent their preferences. Make sure to ask this person for an agreement to act as your proxy. A proxy may have to exercise judgment in the event of a medical decision for which your wishes are not known.  A medical power of  is a legal document that names your health care proxy. Depending on the laws in your state, after the document is written, it may also need to be:  Signed.  Notarized.  Dated.  Copied.  Witnessed.  Incorporated into your medical record.  You may also want to appoint someone to manage your  financial affairs in a situation in which you are unable to do so. This is called a durable power of  for finances. It is a separate legal document from the durable power of  for health care. You may choose the same person or someone different from your health care proxy to act as your agent in financial matters.  If you do not appoint a proxy, or if there is a concern that the proxy is not acting in your best interests, a court-appointed guardian may be designated to act on your behalf.  Living will  A living will is a set of instructions documenting your wishes about medical care when you cannot express them yourself. Health care providers should keep a copy of your living will in your medical record. You may want to give a copy to family members or friends. To alert caregivers in case of an emergency, you can place a card in your wallet to let them know that you have a living will and where they can find it. A living will is used if you become:  Terminally ill.  Incapacitated.  Unable to communicate or make decisions.  Items to consider in your living will include:  The use or non-use of life-sustaining equipment, such as dialysis machines and breathing machines (ventilators).  A DNR or DNAR order, which is the instruction not to use cardiopulmonary resuscitation (CPR) if breathing or heartbeat stops.  The use or non-use of tube feeding.  Withholding of food and fluids.  Comfort (palliative) care when the goal becomes comfort rather than a cure.  Organ and tissue donation.  A living will does not give instructions for distributing your money and property if you should pass away. It is recommended that you seek the advice of a  when writing a will. Decisions about taxes, beneficiaries, and asset distribution will be legally binding. This process can relieve your family and friends of any concerns surrounding disputes or questions that may come up about the distribution of your assets.  DNR or  DNAR  A DNR or DNAR order is a request not to have CPR in the event that your heart stops beating or you stop breathing. If a DNR or DNAR order has not been made and shared, a health care provider will try to help any patient whose heart has stopped or who has stopped breathing. If you plan to have surgery, talk with your health care provider about how your DNR or DNAR order will be followed if problems occur.  Summary  Advance directives are the legal documents that allow you to make choices ahead of time about your health care and medical treatment in case you become unable to communicate for yourself.  The process of discussing and writing advance directives should happen over time. You can change the advance directives, even after you have signed them.  Advance directives include DNR or DNAR orders, living clark, and designating an agent as your medical power of .  This information is not intended to replace advice given to you by your health care provider. Make sure you discuss any questions you have with your health care provider.  Document Released: 03/26/2009 Document Revised: 01/22/2020 Document Reviewed: 11/06/2017  [employer]vier Patient Education © 2020 [employer]

## 2022-11-14 NOTE — PROGRESS NOTES
Chief Complaint   Patient presents with    Medicare Annual Wellness       HPI:  Evan Wright is a 66 y.o. here for Medicare Annual Wellness Visit     He is very active and training for a marathon  Exercises 5 d/wk pelaton and  wt lifting    Chronic trouble sleeping  Doxepin made him feel groggy next day  Doesn't think Xanax does much    Patient Active Problem List    Diagnosis Date Noted    Sensorineural hearing loss, bilateral 10/28/2021    Hearing loss 10/28/2021    Dysfunction of both eustachian tubes 10/28/2021    Bilateral tinnitus 10/28/2021    Palpitations 09/08/2021    PVC (premature ventricular contraction) 09/08/2021    NSVT (nonsustained ventricular tachycardia) 09/08/2021    Tachycardia 06/13/2021    Other male erectile dysfunction 10/01/2018    History of squamous cell carcinoma of skin 03/24/2018    Primary insomnia 03/23/2018    Low testosterone level in male 06/13/2017    Essential hypertension 06/13/2017    Tubular adenoma of colon 01/01/2014       Current Outpatient Medications   Medication Sig Dispense Refill    mirtazapine (REMERON) 7.5 MG tablet Take 1 Tablet by mouth every evening. 30 Tablet 0    Testosterone 1.62 % Gel APPLY 1 PUMP TOPICALLY ONCE DAILY AS NEEDED 75 g 3    DUPIXENT 300 MG/2ML injection       hydroCHLOROthiazide (HYDRODIURIL) 25 MG Tab Take 1 Tablet by mouth every day. 90 Tablet 3    amLODIPine (NORVASC) 10 MG Tab TAKE ONE TABLET BY MOUTH EVERY DAY 90 Tablet 3    clobetasol (TEMOVATE) 0.05 % Cream Apply topically twice a day 45 g 2    fluticasone (FLONASE) 50 MCG/ACT nasal spray Administer 1 Spray into affected nostril(S) every day.      desonide (TRIDESILON) 0.05 % Cream       tadalafil (CIALIS) 20 MG tablet Take 1 Tab by mouth as needed for Erectile Dysfunction (Max 1 dose per 24 hrs). 10 Tab 6    Ascorbic Acid (VITAMIN C PO) Take  by mouth.      Cholecalciferol (VITAMIN D) 2000 units Cap Take 1 Capsule by mouth every day.      B Complex Vitamins (B COMPLEX 1 PO) Take 1  Tab by mouth every day.       No current facility-administered medications for this visit.            Current supplements as per medication list.     Allergies: Patient has no known allergies.    Current social contact/activities:  friends/family       He  reports that he has never smoked. He has never used smokeless tobacco. He reports current alcohol use. He reports that he does not use drugs.  Counseling given: Not Answered      ROS:    Gait: Uses no assistive device   Ostomy: no   Other tubes: no   Amputations: no   Chronic oxygen use: no   Last eye exam:last month    : Denies any urinary leakage during the last 6 months      Screening:     Depression Screening  Little interest or pleasure in doing things?  0 - not at all  Feeling down, depressed , or hopeless? 0 - not at all  Patient Health Questionnaire Score: 0     If depressive symptoms identified deferred to follow up visit unless specifically addressed in assessment and plan.    Interpretation of PHQ-9 Total Score   Score Severity   1-4 No Depression   5-9 Mild Depression   10-14 Moderate Depression   15-19 Moderately Severe Depression   20-27 Severe Depression    Screening for Cognitive Impairment  Three Minute Recall (daughter, heaven, mountain) 3/3    Larry clock face with all 12 numbers and set the hands to show 10 past 11.  Yes    Cognitive concerns identified deferred for follow up unless specifically addressed in assessment and plan.    Fall Risk Assessment  Has the patient had two or more falls in the last year or any fall with injury in the last year?  No    Safety Assessment  Throw rugs on floor.  Yes  Handrails on all stairs.  Yes  Good lighting in all hallways.  Yes  Difficulty hearing.  No  Patient counseled about all safety risks that were identified.    Functional Assessment ADLs  Are there any barriers preventing you from cooking for yourself or meeting nutritional needs?  No.    Are there any barriers preventing you from driving safely or  obtaining transportation?  No.    Are there any barriers preventing you from using a telephone or calling for help?  No.    Are there any barriers preventing you from shopping?  No.    Are there any barriers preventing you from taking care of your own finances?  No.    Are there any barriers preventing you from managing your medications?  No.    Are there any barriers preventing you from showering, bathing or dressing yourself?  No.    Are you currently engaging in any exercise or physical activity?  Yes.     What is your perception of your health?  Excellent    Advance Care Planning  Do you have an Advance Directive, Living Will, Durable Power of , or POLST? No                 Health Maintenance Summary            Overdue - IMM HEP B VACCINE (1 of 3 - 3-dose series) Overdue - never done      No completion history exists for this topic.              Overdue - IMM ZOSTER VACCINES (2 of 3) Overdue since 10/27/2017      09/01/2017  Imm Admin: Zoster Vaccine Live (ZVL) (Zostavax) - HISTORICAL DATA              Overdue - COVID-19 Vaccine (4 - Booster for Moderna series) Overdue since 12/28/2021 11/02/2021  Imm Admin: MODERNA SARS-COV-2 VACCINE (12+)    03/27/2021  Imm Admin: MODERNA SARS-COV-2 VACCINE (12+)    02/27/2021  Imm Admin: MODERNA SARS-COV-2 VACCINE (12+)              Overdue - Annual Wellness Visit (Every 366 Days) Overdue since 10/29/2022      10/28/2021  Level of Service: ANNUAL WELLNESS VISIT-INCLUDES PPPS SUBSEQUE*    10/28/2021  Visit Dx: Medicare annual wellness visit, subsequent    10/05/2020  Visit Dx: Encounter for Medicare annual wellness exam    10/04/2019  Initial Annual Wellness Visit - Includes PPPS ()    10/04/2019  Visit Dx: Welcome to Medicare preventive visit              IMM DTaP/Tdap/Td Vaccine (2 - Td or Tdap) Next due on 7/23/2024 07/23/2014  Imm Admin: Tdap Vaccine              COLORECTAL CANCER SCREENING (COLONOSCOPY - Every 5 Years) Next due on 12/18/2024       "12/18/2019  REFERRAL TO GI FOR COLONOSCOPY    09/26/2014  REFERRAL TO GI FOR COLONOSCOPY    01/01/2014  COLONOSCOPY (Done)              IMM PNEUMOCOCCAL VACCINE: 65+ Years (Series Information) Completed      08/07/2020  Imm Admin: Pneumococcal polysaccharide vaccine (PPSV-23)    04/23/2019  Imm Admin: Pneumococcal Conjugate Vaccine (Prevnar/PCV-13)              IMM INFLUENZA (Series Information) Completed      11/14/2022  Imm Admin: Influenza Vaccine Adult HD    09/23/2021  Imm Admin: Influenza Vaccine Adult HD    09/22/2020  Imm Admin: Influenza Vaccine Adult HD    09/27/2019  Imm Admin: Influenza Vaccine Adult HD    10/01/2018  Imm Admin: Influenza Vaccine Quad Inj (Pf)    Only the first 5 history entries have been loaded, but more history exists.              IMM MENINGOCOCCAL ACWY VACCINE (Series Information) Aged Out      No completion history exists for this topic.              Discontinued - HEPATITIS C SCREENING  Discontinued      No completion history exists for this topic.                    Patient Care Team:  Nimisha Rodriguez M.D. as PCP - General (Family Medicine)  Nicole Phan R.N. as Registered Nurse        Social History     Tobacco Use    Smoking status: Never    Smokeless tobacco: Never   Substance Use Topics    Alcohol use: Yes     Comment: wine daily 1-2/nt    Drug use: No     Family History   Problem Relation Age of Onset    Hypertension Mother     Cancer Father         liver CA    Alcohol/Drug Father     Hypertension Father     No Known Problems Son     No Known Problems Daughter     Heart Disease Neg Hx      He  has a past medical history of Hypertension, Insomnia, and Testosterone insufficiency.   Past Surgical History:   Procedure Laterality Date    FOOT SURGERY Left 2010    left foot neuroma       Exam:   /72   Pulse (!) 56   Temp 36.6 °C (97.9 °F)   Resp 14   Ht 1.854 m (6' 0.99\")   Wt 88.9 kg (196 lb)   SpO2 96%  Body mass index is 25.86 kg/m².    Hearing excellent.  "   Dentition good  Alert, oriented in no acute distress.  Eye contact is good, speech goal directed, affect calm    Physical Exam  Constitutional:       General: He is not in acute distress.     Appearance: Normal appearance.   HENT:      Head: Normocephalic and atraumatic.      Nose: Nose normal.      Mouth/Throat:      Mouth: Mucous membranes are moist.   Eyes:      Conjunctiva/sclera: Conjunctivae normal.   Cardiovascular:      Rate and Rhythm: Normal rate and regular rhythm.   Pulmonary:      Effort: Pulmonary effort is normal.      Breath sounds: Normal breath sounds. No wheezing.   Abdominal:      General: Abdomen is flat.      Palpations: Abdomen is soft.   Musculoskeletal:      Cervical back: No rigidity.      Right lower leg: No edema.      Left lower leg: No edema.   Lymphadenopathy:      Cervical: No cervical adenopathy.   Skin:     Coloration: Skin is not jaundiced.      Findings: No erythema.   Neurological:      General: No focal deficit present.      Mental Status: He is alert and oriented to person, place, and time. Mental status is at baseline.   Psychiatric:         Mood and Affect: Mood normal.         Behavior: Behavior normal.         Thought Content: Thought content normal.         Judgment: Judgment normal.                Assessment and Plan. The following treatment and monitoring plan is recommended:     1. Medicare annual wellness visit, subsequent    2. High vitamin D level- stop vit d and recheck lab in 3 m.  - VITAMIN D,25 HYDROXY (DEFICIENCY); Future    3. Need for vaccination  - INFLUENZA VACCINE, HIGH DOSE (65+ ONLY)    4. Primary insomnia    - mirtazapine (REMERON) 7.5 MG tablet; Take 1 Tablet by mouth every evening.  Dispense: 30 Tablet; Refill: 0      Services suggested: No services needed at this time  Health Care Screening: Age-appropriate preventive services Medicare covers discussed today and ordered if indicated.  Referrals offered: Community-based lifestyle interventions to  reduce health risks and promote self-management and wellness, fall prevention, nutrition, physical activity, tobacco-use cessation, weight loss, and mental health services as per orders if indicated.    Discussion today about general wellness and lifestyle habits:    Prevent falls and reduce trip hazards; Cautioned about securing or removing rugs.  Have a working fire alarm and carbon monoxide detector;   Engage in regular physical activity and social activities     Follow-up: No follow-ups on file.

## 2022-12-07 ENCOUNTER — APPOINTMENT (RX ONLY)
Dept: URBAN - METROPOLITAN AREA CLINIC 35 | Facility: CLINIC | Age: 67
Setting detail: DERMATOLOGY
End: 2022-12-07

## 2022-12-07 DIAGNOSIS — Z71.89 OTHER SPECIFIED COUNSELING: ICD-10-CM

## 2022-12-07 DIAGNOSIS — L82.1 OTHER SEBORRHEIC KERATOSIS: ICD-10-CM

## 2022-12-07 DIAGNOSIS — Z87.2 PERSONAL HISTORY OF DISEASES OF THE SKIN AND SUBCUTANEOUS TISSUE: ICD-10-CM

## 2022-12-07 DIAGNOSIS — L81.4 OTHER MELANIN HYPERPIGMENTATION: ICD-10-CM

## 2022-12-07 DIAGNOSIS — L57.0 ACTINIC KERATOSIS: ICD-10-CM

## 2022-12-07 DIAGNOSIS — Z85.828 PERSONAL HISTORY OF OTHER MALIGNANT NEOPLASM OF SKIN: ICD-10-CM

## 2022-12-07 DIAGNOSIS — D22 MELANOCYTIC NEVI: ICD-10-CM

## 2022-12-07 DIAGNOSIS — L20.89 OTHER ATOPIC DERMATITIS: ICD-10-CM | Status: INADEQUATELY CONTROLLED

## 2022-12-07 PROBLEM — D22.5 MELANOCYTIC NEVI OF TRUNK: Status: ACTIVE | Noted: 2022-12-07

## 2022-12-07 PROCEDURE — ? COUNSELING

## 2022-12-07 PROCEDURE — ? TREATMENT REGIMEN

## 2022-12-07 PROCEDURE — 99213 OFFICE O/P EST LOW 20 MIN: CPT | Mod: 25

## 2022-12-07 PROCEDURE — 17000 DESTRUCT PREMALG LESION: CPT

## 2022-12-07 PROCEDURE — ? LIQUID NITROGEN

## 2022-12-07 PROCEDURE — ? SUNSCREEN RECOMMENDATIONS

## 2022-12-07 ASSESSMENT — LOCATION SIMPLE DESCRIPTION DERM
LOCATION SIMPLE: RIGHT UPPER BACK
LOCATION SIMPLE: CHEST
LOCATION SIMPLE: RIGHT FOREHEAD
LOCATION SIMPLE: LEFT PRETIBIAL REGION
LOCATION SIMPLE: LEFT LOWER LEG
LOCATION SIMPLE: LEFT UPPER BACK
LOCATION SIMPLE: LEFT FOREARM
LOCATION SIMPLE: RIGHT CALF
LOCATION SIMPLE: RIGHT FOREARM
LOCATION SIMPLE: RIGHT LOWER LEG
LOCATION SIMPLE: RIGHT LOWER BACK
LOCATION SIMPLE: RIGHT UPPER ARM
LOCATION SIMPLE: RIGHT PRETIBIAL REGION

## 2022-12-07 ASSESSMENT — LOCATION DETAILED DESCRIPTION DERM
LOCATION DETAILED: RIGHT LATERAL PROXIMAL CALF
LOCATION DETAILED: LEFT SUPERIOR UPPER BACK
LOCATION DETAILED: LEFT MEDIAL SUPERIOR CHEST
LOCATION DETAILED: RIGHT FOREHEAD
LOCATION DETAILED: RIGHT SUPERIOR LATERAL UPPER BACK
LOCATION DETAILED: RIGHT DISTAL PRETIBIAL REGION
LOCATION DETAILED: LEFT PROXIMAL DORSAL FOREARM
LOCATION DETAILED: LEFT LATERAL INFERIOR CHEST
LOCATION DETAILED: RIGHT PROXIMAL POSTERIOR UPPER ARM
LOCATION DETAILED: LEFT MID-UPPER BACK
LOCATION DETAILED: LEFT PROXIMAL PRETIBIAL REGION
LOCATION DETAILED: RIGHT DISTAL LATERAL PRETIBIAL REGION
LOCATION DETAILED: RIGHT DISTAL CALF
LOCATION DETAILED: RIGHT PROXIMAL DORSAL FOREARM
LOCATION DETAILED: RIGHT SUPERIOR MEDIAL MIDBACK
LOCATION DETAILED: LEFT PROXIMAL LATERAL PRETIBIAL REGION
LOCATION DETAILED: LEFT MEDIAL UPPER BACK
LOCATION DETAILED: LEFT INFERIOR UPPER BACK

## 2022-12-07 ASSESSMENT — LOCATION ZONE DERM
LOCATION ZONE: ARM
LOCATION ZONE: TRUNK
LOCATION ZONE: FACE
LOCATION ZONE: LEG

## 2022-12-12 RX ORDER — MIRTAZAPINE 7.5 MG/1
7.5 TABLET, FILM COATED ORAL NIGHTLY
Qty: 30 TABLET | Refills: 2 | Status: SHIPPED | OUTPATIENT
Start: 2022-12-12 | End: 2023-09-29

## 2023-01-23 RX ORDER — HYDROCHLOROTHIAZIDE 25 MG/1
25 TABLET ORAL DAILY
Qty: 90 TABLET | Refills: 3 | Status: SHIPPED | OUTPATIENT
Start: 2023-01-23 | End: 2024-01-29

## 2023-01-30 NOTE — PROCEDURE: PUNCH EXCISION
Detail Level: Detailed
Size Of Lesion: 0.3
X Size Of Lesion Width In Cm (Optional): 0
Size Of Margin In Cm: 0.5
Punch Size In Mm: 8
Repair Type: None
Complex Requirements: Extensive Undermining Performed?: No
Undermining Type: Entire Wound
Yes
Debridement Text: The wound edges were debrided prior to proceeding with the closure to facilitate wound healing.
Helical Rim Text: The closure involved the helical rim.
Vermilion Border Text: The closure involved the vermilion border.
Nostril Rim Text: The closure involved the nostril rim.
Retention Suture Text: Retention sutures were placed to support the closure and prevent dehiscence.
Anesthesia Type: 1% lidocaine with epinephrine
Anesthesia Volume In Cc: 1.5
Hemostasis: Pressure
Epidermal Sutures: 4-0 Nylon
Epidermal Closure: simple interrupted
Wound Care: Petrolatum
Wound Dressings: a bandage
Suture Removal: 14 days
Lab: [id number]
Lab Facility: 
Path Notes (To The Dermatopathologist): Please check margins.
1.5 Mm Punch Excision Text: A 1.5 mm punch biopsy was used to excise the lesion to the level of the subcutaneous fat.  Blunt dissection was used to free the lesion from the surrounding tissues and the lesion was removed.
2 Mm Punch Excision Text: A 2 mm punch biopsy was used to excise the lesion to the level of the subcutaneous fat.  Blunt dissection was used to free the lesion from the surrounding tissues and the lesion was removed.
2.5 Mm Punch Excision Text: A 2.5 mm punch biopsy was used to excise the lesion to the level of the subcutaneous fat.  Blunt dissection was used to free the lesion from the surrounding tissues and the lesion was removed.
3 Mm Punch Excision Text: A 3 mm punch biopsy was used to excise the lesion to the level of the subcutaneous fat.  Blunt dissection was used to free the lesion from the surrounding tissues and the lesion was removed.
3.5 Mm Punch Excision Text: A 3.5 mm punch biopsy was used to excise the lesion to the level of the subcutaneous fat.  Blunt dissection was used to free the lesion from the surrounding tissues and the lesion was removed.
4 Mm Punch Excision Text: A 4 mm punch biopsy was used to excise the lesion to the level of the subcutaneous fat.  Blunt dissection was used to free the lesion from the surrounding tissues and the lesion was removed.
4.5 Mm Punch Excision Text: A 4.5 mm punch biopsy was used to excise the lesion to the level of the subcutaneous fat.  Blunt dissection was used to free the lesion from the surrounding tissues and the lesion was removed.
5 Mm Punch Excision Text: A 5 mm punch biopsy was used to excise the lesion to the level of the subcutaneous fat.  Blunt dissection was used to free the lesion from the surrounding tissues and the lesion was removed.
6 Mm Punch Excision Text: A 6 mm punch biopsy was used to excise the lesion to the level of the subcutaneous fat.  Blunt dissection was used to free the lesion from the surrounding tissues and the lesion was removed.
7 Mm Punch Excision Text: A 7 mm punch biopsy was used to excise the lesion to the level of the subcutaneous fat.  Blunt dissection was used to free the lesion from the surrounding tissues and the lesion was removed.
8 Mm Punch Excision Text: A 8 mm punch biopsy was used to excise the lesion to the level of the subcutaneous fat.  Blunt dissection was used to free the lesion from the surrounding tissues and the lesion was removed.
10 Mm Punch Excision Text: A 10 mm punch biopsy was used to excise the lesion to the level of the subcutaneous fat.  Blunt dissection was used to free the lesion from the surrounding tissues and the lesion was removed.
12 Mm Punch Excision Text: A 12 mm punch biopsy was used to excise the lesion to the level of the subcutaneous fat.  Blunt dissection was used to free the lesion from the surrounding tissues and the lesion was removed.
lr
Consent was obtained from the patient. The risks and benefits to therapy were discussed in detail. Specifically, the risks of infection, scarring, bleeding, prolonged wound healing, incomplete removal, allergy to anesthesia, nerve injury and recurrence were addressed. Prior to the procedure, the treatment site was clearly identified and confirmed by the patient. All components of Universal Protocol/PAUSE Rule completed.
Post-Care Instructions: I reviewed with the patient in detail post-care instructions. Patient is to keep the biopsy site dry overnight, and then apply bacitracin twice daily until healed. Patient may apply hydrogen peroxide soaks to remove any crusting.
Notification Instructions: Patient will be notified of biopsy results. However, patient instructed to call the office if not contacted within 2 weeks.
Billing Type: Third-Party Bill

## 2023-03-16 RX ORDER — AMLODIPINE BESYLATE 10 MG/1
10 TABLET ORAL
Qty: 90 TABLET | Refills: 3 | Status: SHIPPED | OUTPATIENT
Start: 2023-03-16 | End: 2024-02-26

## 2023-04-04 ENCOUNTER — NON-PROVIDER VISIT (OUTPATIENT)
Dept: INTERNAL MEDICINE | Facility: IMAGING CENTER | Age: 68
End: 2023-04-04
Payer: MEDICARE

## 2023-04-04 ENCOUNTER — HOSPITAL ENCOUNTER (OUTPATIENT)
Facility: MEDICAL CENTER | Age: 68
End: 2023-04-04
Attending: FAMILY MEDICINE
Payer: MEDICARE

## 2023-04-04 DIAGNOSIS — E67.3 HIGH VITAMIN D LEVEL: ICD-10-CM

## 2023-04-04 LAB — 25(OH)D3 SERPL-MCNC: 75 NG/ML (ref 30–100)

## 2023-04-04 PROCEDURE — 82306 VITAMIN D 25 HYDROXY: CPT

## 2023-05-05 DIAGNOSIS — R79.89 LOW TESTOSTERONE LEVEL IN MALE: ICD-10-CM

## 2023-05-08 RX ORDER — TESTOSTERONE 20.25 MG/1.25G
GEL TOPICAL
Qty: 75 G | Refills: 3 | Status: SHIPPED | OUTPATIENT
Start: 2023-05-08 | End: 2023-07-07

## 2023-06-07 ENCOUNTER — APPOINTMENT (RX ONLY)
Dept: URBAN - METROPOLITAN AREA CLINIC 35 | Facility: CLINIC | Age: 68
Setting detail: DERMATOLOGY
End: 2023-06-07

## 2023-06-07 DIAGNOSIS — D18.0 HEMANGIOMA: ICD-10-CM

## 2023-06-07 DIAGNOSIS — Z71.89 OTHER SPECIFIED COUNSELING: ICD-10-CM

## 2023-06-07 DIAGNOSIS — D22 MELANOCYTIC NEVI: ICD-10-CM

## 2023-06-07 DIAGNOSIS — L20.89 OTHER ATOPIC DERMATITIS: ICD-10-CM | Status: INADEQUATELY CONTROLLED

## 2023-06-07 DIAGNOSIS — L81.4 OTHER MELANIN HYPERPIGMENTATION: ICD-10-CM

## 2023-06-07 DIAGNOSIS — Z85.828 PERSONAL HISTORY OF OTHER MALIGNANT NEOPLASM OF SKIN: ICD-10-CM

## 2023-06-07 DIAGNOSIS — L82.1 OTHER SEBORRHEIC KERATOSIS: ICD-10-CM

## 2023-06-07 DIAGNOSIS — Z87.2 PERSONAL HISTORY OF DISEASES OF THE SKIN AND SUBCUTANEOUS TISSUE: ICD-10-CM

## 2023-06-07 PROBLEM — D18.01 HEMANGIOMA OF SKIN AND SUBCUTANEOUS TISSUE: Status: ACTIVE | Noted: 2023-06-07

## 2023-06-07 PROBLEM — D22.5 MELANOCYTIC NEVI OF TRUNK: Status: ACTIVE | Noted: 2023-06-07

## 2023-06-07 PROCEDURE — ? PRESCRIPTION

## 2023-06-07 PROCEDURE — 99214 OFFICE O/P EST MOD 30 MIN: CPT

## 2023-06-07 PROCEDURE — ? COUNSELING

## 2023-06-07 PROCEDURE — ? SUNSCREEN RECOMMENDATIONS

## 2023-06-07 PROCEDURE — ? TREATMENT REGIMEN

## 2023-06-07 RX ORDER — CLOBETASOL PROPIONATE 0.5 MG/G
CREAM TOPICAL TWICE A DAY
Qty: 60 | Refills: 3 | Status: ERX

## 2023-06-07 ASSESSMENT — LOCATION SIMPLE DESCRIPTION DERM
LOCATION SIMPLE: LEFT FOREARM
LOCATION SIMPLE: RIGHT PRETIBIAL REGION
LOCATION SIMPLE: CHEST
LOCATION SIMPLE: RIGHT CALF
LOCATION SIMPLE: RIGHT UPPER BACK
LOCATION SIMPLE: RIGHT SHOULDER
LOCATION SIMPLE: LEFT UPPER BACK
LOCATION SIMPLE: RIGHT LOWER LEG
LOCATION SIMPLE: LEFT PRETIBIAL REGION
LOCATION SIMPLE: RIGHT LOWER BACK
LOCATION SIMPLE: RIGHT UPPER ARM
LOCATION SIMPLE: RIGHT FOREHEAD
LOCATION SIMPLE: RIGHT FOREARM
LOCATION SIMPLE: LEFT LOWER LEG

## 2023-06-07 ASSESSMENT — LOCATION DETAILED DESCRIPTION DERM
LOCATION DETAILED: LEFT PROXIMAL PRETIBIAL REGION
LOCATION DETAILED: RIGHT PROXIMAL POSTERIOR UPPER ARM
LOCATION DETAILED: LEFT PROXIMAL DORSAL FOREARM
LOCATION DETAILED: LEFT MID-UPPER BACK
LOCATION DETAILED: LEFT PROXIMAL LATERAL PRETIBIAL REGION
LOCATION DETAILED: RIGHT DISTAL CALF
LOCATION DETAILED: LEFT MEDIAL UPPER BACK
LOCATION DETAILED: RIGHT SUPERIOR MEDIAL MIDBACK
LOCATION DETAILED: LEFT SUPERIOR UPPER BACK
LOCATION DETAILED: RIGHT LATERAL PROXIMAL CALF
LOCATION DETAILED: RIGHT FOREHEAD
LOCATION DETAILED: LEFT LATERAL INFERIOR CHEST
LOCATION DETAILED: RIGHT POSTERIOR SHOULDER
LOCATION DETAILED: LEFT INFERIOR UPPER BACK
LOCATION DETAILED: RIGHT PROXIMAL DORSAL FOREARM
LOCATION DETAILED: RIGHT DISTAL PRETIBIAL REGION
LOCATION DETAILED: RIGHT SUPERIOR LATERAL UPPER BACK
LOCATION DETAILED: RIGHT DISTAL LATERAL PRETIBIAL REGION

## 2023-06-07 ASSESSMENT — LOCATION ZONE DERM
LOCATION ZONE: TRUNK
LOCATION ZONE: ARM
LOCATION ZONE: LEG
LOCATION ZONE: FACE

## 2023-06-07 NOTE — PROCEDURE: MIPS QUALITY
Quality 130: Documentation Of Current Medications In The Medical Record: Current Medications Documented
Quality 226: Preventive Care And Screening: Tobacco Use: Screening And Cessation Intervention: Patient screened for tobacco use and is an ex/non-smoker
Quality 111:Pneumonia Vaccination Status For Older Adults: Patient received any pneumococcal conjugate or polysaccharide vaccine on or after their 60th birthday and before the end of the measurement period
Detail Level: Detailed
Quality 402: Tobacco Use And Help With Quitting Among Adolescents: Patient screened for tobacco and never smoked

## 2023-09-20 ENCOUNTER — NON-PROVIDER VISIT (OUTPATIENT)
Dept: INTERNAL MEDICINE | Facility: IMAGING CENTER | Age: 68
End: 2023-09-20
Payer: MEDICARE

## 2023-09-20 DIAGNOSIS — Z23 NEED FOR INFLUENZA VACCINATION: ICD-10-CM

## 2023-09-20 PROCEDURE — 90662 IIV NO PRSV INCREASED AG IM: CPT | Performed by: FAMILY MEDICINE

## 2023-09-20 PROCEDURE — G0008 ADMIN INFLUENZA VIRUS VAC: HCPCS | Performed by: FAMILY MEDICINE

## 2023-09-29 ENCOUNTER — OFFICE VISIT (OUTPATIENT)
Dept: INTERNAL MEDICINE | Facility: IMAGING CENTER | Age: 68
End: 2023-09-29
Payer: MEDICARE

## 2023-09-29 ENCOUNTER — NON-PROVIDER VISIT (OUTPATIENT)
Dept: INTERNAL MEDICINE | Facility: IMAGING CENTER | Age: 68
End: 2023-09-29
Payer: MEDICARE

## 2023-09-29 VITALS
OXYGEN SATURATION: 93 % | HEIGHT: 73 IN | HEART RATE: 69 BPM | SYSTOLIC BLOOD PRESSURE: 138 MMHG | BODY MASS INDEX: 25.86 KG/M2 | RESPIRATION RATE: 14 BRPM | DIASTOLIC BLOOD PRESSURE: 70 MMHG | TEMPERATURE: 97.3 F

## 2023-09-29 DIAGNOSIS — R05.1 ACUTE COUGH: ICD-10-CM

## 2023-09-29 DIAGNOSIS — J02.9 PHARYNGITIS, UNSPECIFIED ETIOLOGY: ICD-10-CM

## 2023-09-29 LAB
FLUAV RNA SPEC QL NAA+PROBE: NEGATIVE
FLUBV RNA SPEC QL NAA+PROBE: NEGATIVE
RSV RNA SPEC QL NAA+PROBE: NEGATIVE
SARS-COV-2 RNA RESP QL NAA+PROBE: NEGATIVE

## 2023-09-29 PROCEDURE — 0241U POCT CEPHEID COV-2, FLU A/B, RSV - PCR: CPT | Performed by: FAMILY MEDICINE

## 2023-09-29 PROCEDURE — 3075F SYST BP GE 130 - 139MM HG: CPT | Performed by: FAMILY MEDICINE

## 2023-09-29 PROCEDURE — 3078F DIAST BP <80 MM HG: CPT | Performed by: FAMILY MEDICINE

## 2023-09-29 PROCEDURE — 99214 OFFICE O/P EST MOD 30 MIN: CPT | Performed by: FAMILY MEDICINE

## 2023-09-29 RX ORDER — TESTOSTERONE 20.25 MG/1.25G
GEL TOPICAL
COMMUNITY
Start: 2023-09-19 | End: 2024-01-02

## 2023-09-29 RX ORDER — BENZONATATE 100 MG/1
100 CAPSULE ORAL 3 TIMES DAILY PRN
Qty: 60 CAPSULE | Refills: 0 | Status: SHIPPED | OUTPATIENT
Start: 2023-09-29

## 2023-09-29 RX ORDER — AMOXICILLIN AND CLAVULANATE POTASSIUM 875; 125 MG/1; MG/1
1 TABLET, FILM COATED ORAL 2 TIMES DAILY
Qty: 14 TABLET | Refills: 0 | Status: SHIPPED | OUTPATIENT
Start: 2023-09-29 | End: 2023-10-06

## 2023-09-29 ASSESSMENT — PATIENT HEALTH QUESTIONNAIRE - PHQ9: CLINICAL INTERPRETATION OF PHQ2 SCORE: 0

## 2023-09-29 NOTE — PROGRESS NOTES
"Chief Complaint   Patient presents with    Cough     Started Sunday.       HPI:  Patient is a 67 y.o. male established patient of Dr. Rodriguez, who presents today for evaluation of new illness symptoms that started last Sunday. He reports sudden onset of sore throat and then developed cough that is now mainly present at night. He is not sleeping well due to cough and took Nyquil last night with some relief. He has also been using Flonase and allergy control medication to help with rabbit brush bloom related allergy symptoms and denies associated N/V/D/F. Swab done today at our office negative for influenza/COVID/RSV prior to our visit. He is otherwise stable at this time and is in good spirits.     Patient Active Problem List    Diagnosis Date Noted    Sensorineural hearing loss, bilateral 10/28/2021    Hearing loss 10/28/2021    Dysfunction of both eustachian tubes 10/28/2021    Bilateral tinnitus 10/28/2021    Palpitations 09/08/2021    PVC (premature ventricular contraction) 09/08/2021    NSVT (nonsustained ventricular tachycardia) (HCC) 09/08/2021    Tachycardia 06/13/2021    Other male erectile dysfunction 10/01/2018    History of squamous cell carcinoma of skin 03/24/2018    Primary insomnia 03/23/2018    Low testosterone level in male 06/13/2017    Essential hypertension 06/13/2017    Tubular adenoma of colon 01/01/2014       Past medical, surgical, family, and social history was reviewed and updated in Epic chart by me today.     Medications and allergies reviewed and updated in Epic chart by me today.     ROS:  Pertinent positives listed above in HPI. All other systems have been reviewed and are negative.    PE:   /70   Pulse 69   Temp 36.3 °C (97.3 °F)   Resp 14   Ht 1.854 m (6' 0.99\")   SpO2 93%   BMI 25.86 kg/m²   Vital signs reviewed with patient.     Gen: Well developed; well nourished; no acute distress; non toxic appearance   HEENT: Normocephalic; atraumatic; PEERLA b/l; sclera clear b/l; " b/l external auditory canals WNL; b/l dense cerumen; nares patent; oropharynx clear; posterior oropharynx is bright red and infected in appearance; oral mucosa moist; tongue midline; dentition adequate   Neck: No adenopathy; no thyromegaly  CV: Regular rate and rhythm; S1/ S2 present; no murmur, gallop or rub noted  Pulm: No respiratory distress; clear to ascultation b/l; no wheezing or stridor noted b/l; no cough present today   Extremities: No peripheral edema b/l LE extremities/ no clubbing nor cyanosis noted  Skin: Warm and dry; no rashes noted   Neuro: No focal deficits noted   Psych: AAOx4; mood and affect are appropriate    A/P:  1. Pharyngitis, unspecified etiology  New illness present since Sunday as described above in HPI. Recommend patient start Augmentin today, use warm salt water gargles PRN, rest, hydrate, and follow clinical response.   - amoxicillin-clavulanate (AUGMENTIN) 875-125 MG Tab; Take 1 Tablet by mouth 2 times a day for 7 days.  Dispense: 14 Tablet; Refill: 0    2. Acute cough  New condition related to above illness. Recommend patient use Tessalon and/or Delsym cough syrup for symptom management, and new RX sent to pharmacy.   - benzonatate (TESSALON) 100 MG Cap; Take 1 Capsule by mouth 3 times a day as needed for Cough.  Dispense: 60 Capsule; Refill: 0     Recommend patient use Debrox ear drops in each ear regularly to treat dense cerumen present. Patient encouraged to contact our office if illness symptoms do no resolve with treatment plans detailed above.

## 2023-11-08 ENCOUNTER — TELEPHONE (OUTPATIENT)
Dept: INTERNAL MEDICINE | Facility: IMAGING CENTER | Age: 68
End: 2023-11-08
Payer: MEDICARE

## 2023-11-08 DIAGNOSIS — I10 ESSENTIAL HYPERTENSION: Chronic | ICD-10-CM

## 2023-11-08 DIAGNOSIS — Z79.899 MEDICATION MANAGEMENT: ICD-10-CM

## 2023-11-08 DIAGNOSIS — R79.89 LOW TESTOSTERONE LEVEL IN MALE: Chronic | ICD-10-CM

## 2023-11-08 DIAGNOSIS — Z12.5 PROSTATE CANCER SCREENING: ICD-10-CM

## 2023-11-09 ENCOUNTER — NON-PROVIDER VISIT (OUTPATIENT)
Dept: INTERNAL MEDICINE | Facility: IMAGING CENTER | Age: 68
End: 2023-11-09
Payer: MEDICARE

## 2023-11-09 ENCOUNTER — HOSPITAL ENCOUNTER (OUTPATIENT)
Facility: MEDICAL CENTER | Age: 68
End: 2023-11-09
Attending: FAMILY MEDICINE
Payer: MEDICARE

## 2023-11-09 DIAGNOSIS — Z12.5 PROSTATE CANCER SCREENING: ICD-10-CM

## 2023-11-09 DIAGNOSIS — R79.89 LOW TESTOSTERONE LEVEL IN MALE: Chronic | ICD-10-CM

## 2023-11-09 DIAGNOSIS — Z79.899 MEDICATION MANAGEMENT: ICD-10-CM

## 2023-11-09 DIAGNOSIS — I10 ESSENTIAL HYPERTENSION: Chronic | ICD-10-CM

## 2023-11-09 LAB
25(OH)D3 SERPL-MCNC: 77 NG/ML (ref 30–100)
ALBUMIN SERPL BCP-MCNC: 4.9 G/DL (ref 3.2–4.9)
ALBUMIN/GLOB SERPL: 2.1 G/DL
ALP SERPL-CCNC: 56 U/L (ref 30–99)
ALT SERPL-CCNC: 25 U/L (ref 2–50)
ANION GAP SERPL CALC-SCNC: 11 MMOL/L (ref 7–16)
AST SERPL-CCNC: 22 U/L (ref 12–45)
BILIRUB SERPL-MCNC: 0.9 MG/DL (ref 0.1–1.5)
BUN SERPL-MCNC: 24 MG/DL (ref 8–22)
CALCIUM ALBUM COR SERPL-MCNC: 9.2 MG/DL (ref 8.5–10.5)
CALCIUM SERPL-MCNC: 9.9 MG/DL (ref 8.5–10.5)
CHLORIDE SERPL-SCNC: 103 MMOL/L (ref 96–112)
CHOLEST SERPL-MCNC: 173 MG/DL (ref 100–199)
CO2 SERPL-SCNC: 27 MMOL/L (ref 20–33)
CREAT SERPL-MCNC: 0.9 MG/DL (ref 0.5–1.4)
ERYTHROCYTE [DISTWIDTH] IN BLOOD BY AUTOMATED COUNT: 42.8 FL (ref 35.9–50)
EST. AVERAGE GLUCOSE BLD GHB EST-MCNC: 100 MG/DL
GFR SERPLBLD CREATININE-BSD FMLA CKD-EPI: 92 ML/MIN/1.73 M 2
GLOBULIN SER CALC-MCNC: 2.3 G/DL (ref 1.9–3.5)
GLUCOSE SERPL-MCNC: 88 MG/DL (ref 65–99)
HBA1C MFR BLD: 5.1 % (ref 4–5.6)
HCT VFR BLD AUTO: 51.2 % (ref 42–52)
HDLC SERPL-MCNC: 74 MG/DL
HGB BLD-MCNC: 17.7 G/DL (ref 14–18)
LDLC SERPL CALC-MCNC: 87 MG/DL
MCH RBC QN AUTO: 32.2 PG (ref 27–33)
MCHC RBC AUTO-ENTMCNC: 34.6 G/DL (ref 32.3–36.5)
MCV RBC AUTO: 93.3 FL (ref 81.4–97.8)
PLATELET # BLD AUTO: 257 K/UL (ref 164–446)
PMV BLD AUTO: 9.8 FL (ref 9–12.9)
POTASSIUM SERPL-SCNC: 3.9 MMOL/L (ref 3.6–5.5)
PROT SERPL-MCNC: 7.2 G/DL (ref 6–8.2)
PSA SERPL-MCNC: 1.66 NG/ML (ref 0–4)
RBC # BLD AUTO: 5.49 M/UL (ref 4.7–6.1)
SODIUM SERPL-SCNC: 141 MMOL/L (ref 135–145)
TRIGL SERPL-MCNC: 59 MG/DL (ref 0–149)
TSH SERPL DL<=0.005 MIU/L-ACNC: 2.03 UIU/ML (ref 0.38–5.33)
VIT B12 SERPL-MCNC: 803 PG/ML (ref 211–911)
WBC # BLD AUTO: 6.5 K/UL (ref 4.8–10.8)

## 2023-11-09 PROCEDURE — 84403 ASSAY OF TOTAL TESTOSTERONE: CPT

## 2023-11-09 PROCEDURE — 84402 ASSAY OF FREE TESTOSTERONE: CPT

## 2023-11-09 PROCEDURE — 80061 LIPID PANEL: CPT

## 2023-11-09 PROCEDURE — 82607 VITAMIN B-12: CPT

## 2023-11-09 PROCEDURE — 82306 VITAMIN D 25 HYDROXY: CPT

## 2023-11-09 PROCEDURE — 84270 ASSAY OF SEX HORMONE GLOBUL: CPT

## 2023-11-09 PROCEDURE — 80053 COMPREHEN METABOLIC PANEL: CPT

## 2023-11-09 PROCEDURE — 83036 HEMOGLOBIN GLYCOSYLATED A1C: CPT

## 2023-11-09 PROCEDURE — 84443 ASSAY THYROID STIM HORMONE: CPT

## 2023-11-09 PROCEDURE — 85027 COMPLETE CBC AUTOMATED: CPT

## 2023-11-09 PROCEDURE — 84153 ASSAY OF PSA TOTAL: CPT

## 2023-11-09 NOTE — TELEPHONE ENCOUNTER
----- Message from Edie Price R.N. sent at 11/8/2023  2:02 PM PST -----  Please add annual labs.    Thanks!

## 2023-11-11 LAB
SHBG SERPL-SCNC: 38 NMOL/L (ref 19–76)
TESTOST FREE MFR SERPL: 1.7 % (ref 1.6–2.9)
TESTOST FREE SERPL-MCNC: 82 PG/ML (ref 47–244)
TESTOST SERPL-MCNC: 472 NG/DL (ref 300–720)

## 2023-11-16 ENCOUNTER — APPOINTMENT (OUTPATIENT)
Dept: INTERNAL MEDICINE | Facility: IMAGING CENTER | Age: 68
End: 2023-11-16
Payer: MEDICARE

## 2023-11-29 ENCOUNTER — OFFICE VISIT (OUTPATIENT)
Dept: INTERNAL MEDICINE | Facility: IMAGING CENTER | Age: 68
End: 2023-11-29
Payer: MEDICARE

## 2023-11-29 VITALS
BODY MASS INDEX: 25.58 KG/M2 | HEART RATE: 66 BPM | OXYGEN SATURATION: 97 % | DIASTOLIC BLOOD PRESSURE: 78 MMHG | TEMPERATURE: 97.8 F | RESPIRATION RATE: 14 BRPM | WEIGHT: 193 LBS | HEIGHT: 73 IN | SYSTOLIC BLOOD PRESSURE: 144 MMHG

## 2023-11-29 DIAGNOSIS — I10 ESSENTIAL HYPERTENSION: Chronic | ICD-10-CM

## 2023-11-29 DIAGNOSIS — R79.89 LOW TESTOSTERONE LEVEL IN MALE: Chronic | ICD-10-CM

## 2023-11-29 DIAGNOSIS — Z13.6 SCREENING FOR CARDIOVASCULAR CONDITION: ICD-10-CM

## 2023-11-29 DIAGNOSIS — Z00.00 MEDICARE ANNUAL WELLNESS VISIT, SUBSEQUENT: Primary | ICD-10-CM

## 2023-11-29 PROCEDURE — 3077F SYST BP >= 140 MM HG: CPT | Performed by: FAMILY MEDICINE

## 2023-11-29 PROCEDURE — 3078F DIAST BP <80 MM HG: CPT | Performed by: FAMILY MEDICINE

## 2023-11-29 PROCEDURE — G0439 PPPS, SUBSEQ VISIT: HCPCS | Performed by: FAMILY MEDICINE

## 2023-11-29 ASSESSMENT — ENCOUNTER SYMPTOMS: GENERAL WELL-BEING: EXCELLENT

## 2023-11-29 ASSESSMENT — ACTIVITIES OF DAILY LIVING (ADL): BATHING_REQUIRES_ASSISTANCE: 0

## 2023-11-29 ASSESSMENT — PATIENT HEALTH QUESTIONNAIRE - PHQ9: CLINICAL INTERPRETATION OF PHQ2 SCORE: 0

## 2023-11-29 ASSESSMENT — FIBROSIS 4 INDEX: FIB4 SCORE: 1.18

## 2023-11-29 NOTE — PROGRESS NOTES
Chief Complaint   Patient presents with    Medicare Annual Wellness       HPI:  Evan Wright is a 67 y.o. here for Medicare Annual Wellness Visit   He exercises 6 hours a week and this includes strength training    Denies any urinary symptoms  He does have a chronic cough for 2 months with clear sinus drainage  He does not feel ill    He sees Dr. Cole in cardiology          Patient Active Problem List    Diagnosis Date Noted    Sensorineural hearing loss, bilateral 10/28/2021    Hearing loss 10/28/2021    Dysfunction of both eustachian tubes 10/28/2021    Bilateral tinnitus 10/28/2021    Palpitations 09/08/2021    PVC (premature ventricular contraction) 09/08/2021    NSVT (nonsustained ventricular tachycardia) (HCC) 09/08/2021    Tachycardia 06/13/2021    Other male erectile dysfunction 10/01/2018    History of squamous cell carcinoma of skin 03/24/2018    Primary insomnia 03/23/2018    Low testosterone level in male 06/13/2017    Essential hypertension 06/13/2017    Tubular adenoma of colon 01/01/2014       Current Outpatient Medications   Medication Sig Dispense Refill    Testosterone 1.62 % Gel APPLY 1 PUMP TOPICALLY TO THE AFFECTED AREA EVERY DAY AS NEEDED      benzonatate (TESSALON) 100 MG Cap Take 1 Capsule by mouth 3 times a day as needed for Cough. 60 Capsule 0    amLODIPine (NORVASC) 10 MG Tab Take 1 Tablet by mouth every day. 90 Tablet 3    hydroCHLOROthiazide (HYDRODIURIL) 25 MG Tab TAKE 1 TABLET BY MOUTH EVERY DAY 90 Tablet 3    clobetasol (TEMOVATE) 0.05 % Cream Apply topically twice a day 45 g 2    fluticasone (FLONASE) 50 MCG/ACT nasal spray Administer 1 Spray into affected nostril(S) every day.      desonide (TRIDESILON) 0.05 % Cream       tadalafil (CIALIS) 20 MG tablet Take 1 Tab by mouth as needed for Erectile Dysfunction (Max 1 dose per 24 hrs). 10 Tab 6    Ascorbic Acid (VITAMIN C PO) Take  by mouth.      Cholecalciferol (VITAMIN D) 2000 units Cap Take 1 Capsule by mouth every day.      B  Complex Vitamins (B COMPLEX 1 PO) Take 1 Tab by mouth every day.       No current facility-administered medications for this visit.          Current supplements as per medication list.     Allergies: Patient has no known allergies.    Current social contact/activities: friends/family     He  reports that he has never smoked. He has never used smokeless tobacco. He reports current alcohol use. He reports that he does not use drugs.  Counseling given: Not Answered      ROS:          Gait: Uses no assistive device  Ostomy: No  Other tubes: No  Amputations: No  Chronic oxygen use: No  Last eye exam: Up-to-date  Wears hearing aids: No   : Denies any unmanageable urinary leakage during the last 6 months       Screening:     Depression Screening  Little interest or pleasure in doing things?  0 - not at all  Feeling down, depressed , or hopeless? 0 - not at all  Patient Health Questionnaire Score: 0     If depressive symptoms identified deferred to follow up visit unless specifically addressed in assessment and plan.    Interpretation of PHQ-9 Total Score   Score Severity   1-4 No Depression   5-9 Mild Depression   10-14 Moderate Depression   15-19 Moderately Severe Depression   20-27 Severe Depression    Screening for Cognitive Impairment  Do you or any of your friends or family members have any concern about your memory? No  Three Minute Recall (Banana, Sunrise, Chair) 3/3    Larry clock face with all 12 numbers and set the hands to show 20 past 8.  Yes    Cognitive concerns identified deferred for follow up unless specifically addressed in assessment and plan.    Fall Risk Assessment  Has the patient had two or more falls in the last year or any fall with injury in the last year?  No    Safety Assessment  Do you always wear your seatbelt?  Yes  Any changes to home needed to function safely? Yes  Difficulty hearing.  No  Patient counseled about all safety risks that were identified.    Functional Assessment ADLs  Are  there any barriers preventing you from cooking for yourself or meeting nutritional needs?  No.    Are there any barriers preventing you from driving safely or obtaining transportation?  No.    Are there any barriers preventing you from using a telephone or calling for help?  No    Are there any barriers preventing you from shopping?  No.    Are there any barriers preventing you from taking care of your own finances?  No    Are there any barriers preventing you from managing your medications?  No    Are there any barriers preventing you from showering, bathing or dressing yourself? No    Are there any barriers preventing you from doing housework or laundry? No  Are there any barriers preventing you from using the toilet?No  Are you currently engaging in any exercise or physical activity?  Yes.      Self-Assessment of Health  What is your perception of your health? Excellent  Do you sleep more than six hours a night? Yes  In the past 7 days, how much did pain keep you from doing your normal work? None  Do you spend quality time with family or friends (virtually or in person)? Yes  Do you usually eat a heart healthy diet that constists of a variety of fruits, vegetables, whole grains and fiber? Yes  Do you eat foods high in fat and/or Fast Food more than three times per week? No    Advance Care Planning  Do you have an Advance Directive, Living Will, Durable Power of , or POLST? No                 Health Maintenance Summary            Overdue - COVID-19 Vaccine (4 - Moderna series) Overdue since 12/28/2021 11/02/2021  Imm Admin: MODERNA SARS-COV-2 VACCINE (12+)    03/27/2021  Imm Admin: MODERNA SARS-COV-2 VACCINE (12+)    02/27/2021  Imm Admin: MODERNA SARS-COV-2 VACCINE (12+)              IMM DTaP/Tdap/Td Vaccine (2 - Td or Tdap) Next due on 7/23/2024 07/23/2014  Imm Admin: Tdap Vaccine              Annual Wellness Visit (Every 366 Days) Next due on 11/29/2024 11/29/2023  Level of Service:  ANNUAL WELLNESS VISIT-INCLUDES PPPS SUBSEQUE*    11/29/2023  Visit Dx: Medicare annual wellness visit, subsequent    11/14/2022  Visit Dx: Medicare annual wellness visit, subsequent    11/14/2022  Level of Service: [place] ANNUAL WELLNESS VISIT-INCLUDES PPPS SUBSEQUE*    10/28/2021  Level of Service: [place] ANNUAL WELLNESS VISIT-INCLUDES PPPS SUBSEQUE*    Only the first 5 history entries have been loaded, but more history exists.              Colorectal Cancer Screening (Colonoscopy - Every 5 Years) Tentatively due on 12/18/2024 12/18/2019  REFERRAL TO GI FOR COLONOSCOPY    09/26/2014  REFERRAL TO GI FOR COLONOSCOPY    01/01/2014  Colonoscopy (Done)              Pneumococcal Vaccine: 65+ Years (Series Information) Completed      08/07/2020  Imm Admin: Pneumococcal polysaccharide vaccine (PPSV-23)    04/23/2019  Imm Admin: Pneumococcal Conjugate Vaccine (Prevnar/PCV-13)              Zoster (Shingles) Vaccines (Series Information) Completed      04/07/2023  Imm Admin: Zoster Vaccine Recombinant (RZV) (SHINGRIX)    01/23/2023  Imm Admin: Zoster Vaccine Recombinant (RZV) (SHINGRIX)    09/01/2017  Imm Admin: Zoster Vaccine Live (ZVL) (Zostavax) - HISTORICAL DATA              Influenza Vaccine (Series Information) Completed      09/20/2023  Imm Admin: Influenza Vaccine Adult HD    11/14/2022  Imm Admin: Influenza Vaccine Adult HD    09/23/2021  Imm Admin: Influenza Vaccine Adult HD    09/22/2020  Imm Admin: Influenza Vaccine Adult HD    09/27/2019  Imm Admin: Influenza Vaccine Adult HD    Only the first 5 history entries have been loaded, but more history exists.              Hepatitis A Vaccine (Hep A) (Series Information) Aged Out      No completion history exists for this topic.              Hepatitis B Vaccine (Hep B) (Series Information) Aged Out      No completion history exists for this topic.              HPV Vaccines (Series Information) Aged Out      No completion history exists for this topic.              Polio  "Vaccine (Inactivated Polio) (Series Information) Aged Out      No completion history exists for this topic.              Meningococcal Immunization (Series Information) Aged Out      No completion history exists for this topic.              Discontinued - Hepatitis C Screening  Discontinued      No completion history exists for this topic.                    Patient Care Team:  Nimisha Rodrigeuz M.D. as PCP - General (Family Medicine)  Nicole Phan R.N. as Registered Nurse  Edie Price R.N.        Social History     Tobacco Use    Smoking status: Never    Smokeless tobacco: Never   Substance Use Topics    Alcohol use: Yes     Comment: wine daily 1-2/nt    Drug use: No     Family History   Problem Relation Age of Onset    Hypertension Mother     Cancer Father         liver CA    Alcohol/Drug Father     Hypertension Father     No Known Problems Son     No Known Problems Daughter     Heart Disease Neg Hx      He  has a past medical history of Hypertension, Insomnia, and Testosterone insufficiency.   Past Surgical History:   Procedure Laterality Date    FOOT SURGERY Left 2010    left foot neuroma       Exam:   BP (!) 144/78   Pulse 66   Temp 36.6 °C (97.8 °F)   Resp 14   Ht 1.854 m (6' 0.99\")   Wt 87.5 kg (193 lb)   SpO2 97%  Body mass index is 25.47 kg/m².         Physical Exam  Constitutional:       General: he is not in acute distress.     Appearance: Normal appearance.   HENT:      Head: Normocephalic and atraumatic.      Nose: Nose normal.      Mouth/Throat:      Mouth: Mucous membranes are moist.   Eyes:      Conjunctiva/sclera: Conjunctivae normal.   Cardiovascular:      Rate and Rhythm: Normal rate and regular rhythm.   Pulmonary:      Effort: Pulmonary effort is normal.      Breath sounds: Normal breath sounds. No wheezing.   Abdominal:      General: Abdomen is flat.      Palpations: Abdomen is soft.   Musculoskeletal:      Cervical back: No rigidity.      Right lower leg: No edema.      " Left lower leg: No edema.   Lymphadenopathy:      Cervical: No cervical adenopathy.   Skin:     Coloration: Skin is not jaundiced.      Findings: No erythema.   Neurological:      General: No focal deficit present.      Mental Status: he is alert and oriented to person, place, and time. Mental status is at baseline.   Psychiatric:         Mood and Affect: Mood normal.         Behavior: Behavior normal.         Thought Content: Thought content normal.         Judgment: Judgment normal.         Assessment and Plan. The following treatment and monitoring plan is recommended:     1. Medicare annual wellness visit, subsequent  Reviewed labs, vaccinations and cancer screenings    2. Screening for cardiovascular condition  - CT-CARDIAC SCORING; Future    3. Essential hypertension-BP is above goal, he will monitor closely and contact me with results    4. Low testosterone level in male           Services suggested: No services needed at this time  Health Care Screening: Age-appropriate preventive services recommended by USPTF and ACIP covered by Medicare were discussed today. Services ordered if indicated and agreed upon by the patient.  Referrals offered: Community-based lifestyle interventions to reduce health risks and promote self-management and wellness, fall prevention, nutrition, physical activity, tobacco-use cessation, weight loss, and mental health services as per orders if indicated.    Discussion today about general wellness and lifestyle habits:    Prevent falls and reduce trip hazards; Cautioned about securing or removing rugs.  Have a working fire alarm and carbon monoxide detector;   Engage in regular physical activity and social activities     Follow-up:  Labs every 6 months and routine annual wellness exam

## 2023-12-05 ENCOUNTER — PATIENT MESSAGE (OUTPATIENT)
Dept: INTERNAL MEDICINE | Facility: IMAGING CENTER | Age: 68
End: 2023-12-05
Payer: MEDICARE

## 2023-12-05 ENCOUNTER — HOSPITAL ENCOUNTER (OUTPATIENT)
Dept: RADIOLOGY | Facility: MEDICAL CENTER | Age: 68
End: 2023-12-05
Attending: FAMILY MEDICINE
Payer: COMMERCIAL

## 2023-12-05 DIAGNOSIS — Z13.6 SCREENING FOR CARDIOVASCULAR CONDITION: ICD-10-CM

## 2023-12-05 DIAGNOSIS — R93.89 ABNORMAL FINDING ON CT SCAN: ICD-10-CM

## 2023-12-05 PROCEDURE — 4410556 CT-CARDIAC SCORING (SELF PAY ONLY)

## 2023-12-05 RX ORDER — CLONIDINE HYDROCHLORIDE 0.1 MG/1
0.1 TABLET ORAL 2 TIMES DAILY
Qty: 180 TABLET | Refills: 3 | Status: SHIPPED | OUTPATIENT
Start: 2023-12-05

## 2023-12-05 RX ORDER — ROSUVASTATIN CALCIUM 5 MG/1
5 TABLET, COATED ORAL EVERY EVENING
Qty: 90 TABLET | Refills: 3 | Status: SHIPPED | OUTPATIENT
Start: 2023-12-05

## 2023-12-05 RX ORDER — CLONIDINE HYDROCHLORIDE 0.1 MG/1
0.1 TABLET ORAL 2 TIMES DAILY
Qty: 60 TABLET | Refills: 3 | Status: SHIPPED | OUTPATIENT
Start: 2023-12-05 | End: 2023-12-05

## 2023-12-05 RX ORDER — ROSUVASTATIN CALCIUM 5 MG/1
5 TABLET, COATED ORAL EVERY EVENING
Qty: 30 TABLET | Refills: 11 | Status: SHIPPED | OUTPATIENT
Start: 2023-12-05 | End: 2023-12-05

## 2023-12-06 ENCOUNTER — APPOINTMENT (RX ONLY)
Dept: URBAN - METROPOLITAN AREA CLINIC 35 | Facility: CLINIC | Age: 68
Setting detail: DERMATOLOGY
End: 2023-12-06

## 2023-12-06 DIAGNOSIS — Z85.828 PERSONAL HISTORY OF OTHER MALIGNANT NEOPLASM OF SKIN: ICD-10-CM

## 2023-12-06 DIAGNOSIS — Z87.2 PERSONAL HISTORY OF DISEASES OF THE SKIN AND SUBCUTANEOUS TISSUE: ICD-10-CM

## 2023-12-06 DIAGNOSIS — D485 NEOPLASM OF UNCERTAIN BEHAVIOR OF SKIN: ICD-10-CM | Status: INADEQUATELY CONTROLLED

## 2023-12-06 DIAGNOSIS — D22 MELANOCYTIC NEVI: ICD-10-CM

## 2023-12-06 DIAGNOSIS — L81.4 OTHER MELANIN HYPERPIGMENTATION: ICD-10-CM

## 2023-12-06 DIAGNOSIS — L82.1 OTHER SEBORRHEIC KERATOSIS: ICD-10-CM

## 2023-12-06 DIAGNOSIS — Z71.89 OTHER SPECIFIED COUNSELING: ICD-10-CM

## 2023-12-06 DIAGNOSIS — D18.0 HEMANGIOMA: ICD-10-CM

## 2023-12-06 DIAGNOSIS — L20.89 OTHER ATOPIC DERMATITIS: ICD-10-CM

## 2023-12-06 PROBLEM — D22.5 MELANOCYTIC NEVI OF TRUNK: Status: ACTIVE | Noted: 2023-12-06

## 2023-12-06 PROBLEM — D48.5 NEOPLASM OF UNCERTAIN BEHAVIOR OF SKIN: Status: ACTIVE | Noted: 2023-12-06

## 2023-12-06 PROBLEM — D18.01 HEMANGIOMA OF SKIN AND SUBCUTANEOUS TISSUE: Status: ACTIVE | Noted: 2023-12-06

## 2023-12-06 PROCEDURE — ? SUNSCREEN RECOMMENDATIONS

## 2023-12-06 PROCEDURE — ? TREATMENT REGIMEN

## 2023-12-06 PROCEDURE — ? BIOPSY BY SHAVE METHOD

## 2023-12-06 PROCEDURE — 99214 OFFICE O/P EST MOD 30 MIN: CPT | Mod: 25

## 2023-12-06 PROCEDURE — 11102 TANGNTL BX SKIN SINGLE LES: CPT

## 2023-12-06 PROCEDURE — ? SEPARATE AND IDENTIFIABLE DOCUMENTATION

## 2023-12-06 PROCEDURE — ? SURGICAL DECISION MAKING

## 2023-12-06 PROCEDURE — ? COUNSELING

## 2023-12-06 ASSESSMENT — LOCATION DETAILED DESCRIPTION DERM
LOCATION DETAILED: LEFT PROXIMAL LATERAL PRETIBIAL REGION
LOCATION DETAILED: RIGHT POSTERIOR SHOULDER
LOCATION DETAILED: LEFT MID-UPPER BACK
LOCATION DETAILED: RIGHT DISTAL LATERAL PRETIBIAL REGION
LOCATION DETAILED: RIGHT FOREHEAD
LOCATION DETAILED: LEFT MEDIAL UPPER BACK
LOCATION DETAILED: LEFT SUPERIOR UPPER BACK
LOCATION DETAILED: RIGHT PROXIMAL POSTERIOR UPPER ARM
LOCATION DETAILED: RIGHT DISTAL PRETIBIAL REGION
LOCATION DETAILED: RIGHT SUPERIOR LATERAL UPPER BACK
LOCATION DETAILED: LEFT INFERIOR UPPER BACK
LOCATION DETAILED: RIGHT LATERAL PROXIMAL CALF
LOCATION DETAILED: RIGHT DISTAL CALF
LOCATION DETAILED: RIGHT SUPERIOR MEDIAL MIDBACK
LOCATION DETAILED: RIGHT LATERAL PROXIMAL PRETIBIAL REGION
LOCATION DETAILED: LEFT PROXIMAL DORSAL FOREARM
LOCATION DETAILED: LEFT LATERAL INFERIOR CHEST
LOCATION DETAILED: LEFT PROXIMAL PRETIBIAL REGION
LOCATION DETAILED: RIGHT PROXIMAL DORSAL FOREARM

## 2023-12-06 ASSESSMENT — LOCATION SIMPLE DESCRIPTION DERM
LOCATION SIMPLE: LEFT UPPER BACK
LOCATION SIMPLE: CHEST
LOCATION SIMPLE: LEFT LOWER LEG
LOCATION SIMPLE: RIGHT SHOULDER
LOCATION SIMPLE: LEFT PRETIBIAL REGION
LOCATION SIMPLE: RIGHT LOWER BACK
LOCATION SIMPLE: RIGHT LOWER LEG
LOCATION SIMPLE: RIGHT FOREHEAD
LOCATION SIMPLE: RIGHT UPPER ARM
LOCATION SIMPLE: LEFT FOREARM
LOCATION SIMPLE: RIGHT PRETIBIAL REGION
LOCATION SIMPLE: RIGHT UPPER BACK
LOCATION SIMPLE: RIGHT CALF
LOCATION SIMPLE: RIGHT FOREARM

## 2023-12-06 ASSESSMENT — LOCATION ZONE DERM
LOCATION ZONE: TRUNK
LOCATION ZONE: LEG
LOCATION ZONE: ARM
LOCATION ZONE: FACE

## 2023-12-06 NOTE — PROCEDURE: SURGICAL DECISION MAKING
Risk Assessment Explanation (Does Not Render In The Note): Clinical determination of the probability and/or consequences of an event, such as surgery. Clinical assessment of the level of risk is affected by the nature of the event under consideration for the patient. Modifier 57 is used to indicate an Evaluation and Management (E/M) service resulted in the initial decision to perform surgery either the day before a major surgery (90 day global) or the day of a major surgery.
Discussion: We discussed not only the risks of the procedure but also the likely feldman that further treatment will be required to treat lesion. This could involve another visit here to destroy or excise  lesion, a visit to a Mohs or general or plastic surgeon, scarring, limited  activity, cosmetic imperfections.
Identified Risk Factors Documented?: yes
Complexity (Necessary For Coding; Major - 90 Day Global With Some Exceptions; Minor - 10 Day Global): minor
Date Of Surgery - Today Or Tomorrow?: today

## 2023-12-29 DIAGNOSIS — R79.89 LOW TESTOSTERONE LEVEL IN MALE: ICD-10-CM

## 2024-01-02 DIAGNOSIS — R79.89 LOW TESTOSTERONE LEVEL IN MALE: ICD-10-CM

## 2024-01-02 RX ORDER — TESTOSTERONE 20.25 MG/1.25G
GEL TOPICAL
Qty: 75 G | Refills: 0 | Status: SHIPPED | OUTPATIENT
Start: 2024-01-02 | End: 2024-01-03 | Stop reason: SDUPTHER

## 2024-01-03 DIAGNOSIS — R79.89 LOW TESTOSTERONE LEVEL IN MALE: ICD-10-CM

## 2024-01-03 RX ORDER — TESTOSTERONE 20.25 MG/1.25G
GEL TOPICAL
Qty: 75 G | Refills: 0 | Status: SHIPPED | OUTPATIENT
Start: 2024-01-03 | End: 2024-03-01

## 2024-01-22 ENCOUNTER — OFFICE VISIT (OUTPATIENT)
Dept: CARDIOLOGY | Facility: MEDICAL CENTER | Age: 69
End: 2024-01-22
Attending: INTERNAL MEDICINE
Payer: MEDICARE

## 2024-01-22 VITALS
HEIGHT: 72 IN | HEART RATE: 60 BPM | WEIGHT: 198 LBS | OXYGEN SATURATION: 96 % | BODY MASS INDEX: 26.82 KG/M2 | DIASTOLIC BLOOD PRESSURE: 64 MMHG | SYSTOLIC BLOOD PRESSURE: 124 MMHG

## 2024-01-22 DIAGNOSIS — E78.5 DYSLIPIDEMIA: ICD-10-CM

## 2024-01-22 DIAGNOSIS — R93.1 AGATSTON CORONARY ARTERY CALCIUM SCORE GREATER THAN 400: ICD-10-CM

## 2024-01-22 DIAGNOSIS — I10 ESSENTIAL HYPERTENSION: Chronic | ICD-10-CM

## 2024-01-22 DIAGNOSIS — I49.3 PVC (PREMATURE VENTRICULAR CONTRACTION): ICD-10-CM

## 2024-01-22 PROBLEM — R00.2 PALPITATIONS: Status: RESOLVED | Noted: 2021-09-08 | Resolved: 2024-01-22

## 2024-01-22 PROCEDURE — 3074F SYST BP LT 130 MM HG: CPT | Performed by: INTERNAL MEDICINE

## 2024-01-22 PROCEDURE — 3078F DIAST BP <80 MM HG: CPT | Performed by: INTERNAL MEDICINE

## 2024-01-22 PROCEDURE — 99214 OFFICE O/P EST MOD 30 MIN: CPT | Performed by: INTERNAL MEDICINE

## 2024-01-22 PROCEDURE — 99213 OFFICE O/P EST LOW 20 MIN: CPT | Performed by: INTERNAL MEDICINE

## 2024-01-22 ASSESSMENT — ENCOUNTER SYMPTOMS
BLURRED VISION: 0
SHORTNESS OF BREATH: 0
MYALGIAS: 0
HEADACHES: 0
ABDOMINAL PAIN: 0
WEIGHT LOSS: 0
CHILLS: 0
PALPITATIONS: 0
RESPIRATORY NEGATIVE: 1
NAUSEA: 0
CARDIOVASCULAR NEGATIVE: 1
FEVER: 0
NEUROLOGICAL NEGATIVE: 1
COUGH: 0
CLAUDICATION: 0
DOUBLE VISION: 0
EYES NEGATIVE: 1
VOMITING: 0
DIZZINESS: 0
BRUISES/BLEEDS EASILY: 0
FOCAL WEAKNESS: 0
PSYCHIATRIC NEGATIVE: 1
NERVOUS/ANXIOUS: 0
GASTROINTESTINAL NEGATIVE: 1
WEAKNESS: 0
CONSTITUTIONAL NEGATIVE: 1
DEPRESSION: 0
MUSCULOSKELETAL NEGATIVE: 1

## 2024-01-22 ASSESSMENT — FIBROSIS 4 INDEX: FIB4 SCORE: 1.2

## 2024-01-22 NOTE — PROGRESS NOTES
Chief Complaint   Patient presents with    Palpitations    Premature Ventricular Contractions (PVCs)    Arrhythmia     F/V Dx: Cardiac arrhythmia, unspecified cardiac arrhythmia type       Subjective     Evan Wright is a 68 y.o. male who presents today for annual follow up of positive CT coronary calcium study, premature ventricular contractions.    Since the patient's last visit on 11/08/22, he has been doing well clinically. He denies chest pain, shortness of breath, palpitations, nausea/vomiting or diaphoresis. He underwent CT coronary calcium study which was positive as described.  He keeps active exercising exercising with cycling or weight, weight lifting.     Past Medical History:   Diagnosis Date    Hypertension     Insomnia     Testosterone insufficiency      Past Surgical History:   Procedure Laterality Date    FOOT SURGERY Left 2010    left foot neuroma     Family History   Problem Relation Age of Onset    Hypertension Mother     Cancer Father         liver CA    Alcohol/Drug Father     Hypertension Father     No Known Problems Son     No Known Problems Daughter     Heart Disease Neg Hx      Social History     Socioeconomic History    Marital status:      Spouse name: Not on file    Number of children: 2    Years of education: Not on file    Highest education level: Not on file   Occupational History    Occupation: Retired   Tobacco Use    Smoking status: Never    Smokeless tobacco: Never   Substance and Sexual Activity    Alcohol use: Yes     Comment: wine daily 1-2/nt    Drug use: No    Sexual activity: Yes     Partners: Female     Birth control/protection: Post-Menopausal   Other Topics Concern    Not on file   Social History Narrative    Retired business owner, 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      2 grown kids     Social Determinants of Health     Financial Resource Strain: Not on file   Food Insecurity: Not on file   Transportation Needs: No Transportation Needs (6/7/2021)    PRAPARE -  Transportation     Lack of Transportation (Medical): No     Lack of Transportation (Non-Medical): No   Physical Activity: Sufficiently Active (6/7/2021)    Exercise Vital Sign     Days of Exercise per Week: 7 days     Minutes of Exercise per Session: 40 min   Stress: Not on file   Social Connections: Not on file   Intimate Partner Violence: Not on file   Housing Stability: Not on file     No Known Allergies    (Medications reviewed.)  Outpatient Encounter Medications as of 1/22/2024   Medication Sig Dispense Refill    ASPIRIN 81 PO Take  by mouth. 1 tab every day      Testosterone 1.62 % Gel APPLY 1 PUMP TOPICALLY TO THE AFFECTED AREA EVERY DAY AS NEEDED 75 g 0    rosuvastatin (CRESTOR) 5 MG Tab TAKE 1 TABLET BY MOUTH EVERY EVENING 90 Tablet 3    cloNIDine (CATAPRES) 0.1 MG Tab TAKE 1 TABLET BY MOUTH TWICE DAILY 180 Tablet 3    amLODIPine (NORVASC) 10 MG Tab Take 1 Tablet by mouth every day. 90 Tablet 3    hydroCHLOROthiazide (HYDRODIURIL) 25 MG Tab TAKE 1 TABLET BY MOUTH EVERY DAY 90 Tablet 3    clobetasol (TEMOVATE) 0.05 % Cream Apply topically twice a day 45 g 2    fluticasone (FLONASE) 50 MCG/ACT nasal spray Administer 1 Spray into affected nostril(S) every day.      tadalafil (CIALIS) 20 MG tablet Take 1 Tab by mouth as needed for Erectile Dysfunction (Max 1 dose per 24 hrs). 10 Tab 6    Ascorbic Acid (VITAMIN C PO) Take  by mouth.      Cholecalciferol (VITAMIN D) 2000 units Cap Take 1 Capsule by mouth every day.      B Complex Vitamins (B COMPLEX 1 PO) Take 1 Tab by mouth every day.      benzonatate (TESSALON) 100 MG Cap Take 1 Capsule by mouth 3 times a day as needed for Cough. (Patient not taking: Reported on 1/22/2024) 60 Capsule 0    desonide (TRIDESILON) 0.05 % Cream  (Patient not taking: Reported on 1/22/2024)       No facility-administered encounter medications on file as of 1/22/2024.     Review of Systems   Constitutional: Negative.  Negative for chills, fever, malaise/fatigue and weight loss.   HENT:  Negative.  Negative for hearing loss.    Eyes: Negative.  Negative for blurred vision and double vision.   Respiratory: Negative.  Negative for cough and shortness of breath.    Cardiovascular: Negative.  Negative for chest pain, palpitations, claudication and leg swelling.   Gastrointestinal: Negative.  Negative for abdominal pain, nausea and vomiting.   Genitourinary: Negative.  Negative for dysuria and urgency.   Musculoskeletal: Negative.  Negative for joint pain and myalgias.   Skin: Negative.  Negative for itching and rash.   Neurological: Negative.  Negative for dizziness, focal weakness, weakness and headaches.   Endo/Heme/Allergies: Negative.  Does not bruise/bleed easily.   Psychiatric/Behavioral: Negative.  Negative for depression. The patient is not nervous/anxious.               Objective     /64 (BP Location: Left arm, Patient Position: Sitting, BP Cuff Size: Adult)   Pulse 60   Ht 1.829 m (6')   Wt 89.8 kg (198 lb)   SpO2 96%   BMI 26.85 kg/m²     Physical Exam  Constitutional:       Appearance: Normal appearance. He is well-developed and normal weight.   HENT:      Head: Normocephalic and atraumatic.   Neck:      Vascular: No JVD.   Cardiovascular:      Rate and Rhythm: Normal rate and regular rhythm.      Heart sounds: Normal heart sounds.   Pulmonary:      Effort: Pulmonary effort is normal.      Breath sounds: Normal breath sounds.   Abdominal:      General: Bowel sounds are normal.      Palpations: Abdomen is soft.      Comments: No hepatosplenomegaly.   Musculoskeletal:         General: Normal range of motion.   Lymphadenopathy:      Cervical: No cervical adenopathy.   Skin:     General: Skin is warm and dry.   Neurological:      Mental Status: He is alert and oriented to person, place, and time.            CARDIAC STUDIES/PROCEDURES:     BIOTEL CONCLUSIONS (07/26/21-08/24/21)   BioTel study showing predominately sinus rhythm with maximum rate of 133 bpm, minimum rate of 50 bpm,  average of 62 bpm.   Atrial fibrillation: None.   Supraventricular tachycardia: None.   Pauses: None.   Heart block: None.   Ventricular tachycardia: 3 episodes with fastest run of 126 bpm of 4 beats and longest run on 18 beats.   1% burden of premature ventricular contractions and 1% burden of premature atrial contractions.   Symptoms associated with sinus tachycardia with premature ventricular contractions.     CT CARDIAC SCORING (12/05/23)  Coronary calcification:  LMA - 198.4  LCX - 0.0  LAD - 1242.2  RCA - 84.4  Total Calcium Score: 1524.9  Percentile: Calcium score is above the 90th percentile for the patient's age and sex.  IMPRESSION:  1.  Changes in the esophagus and adjacent posterior mediastinum could be due to inflammatory disease or as likely underlying mass. Gastroenterology evaluation is recommended.  2.  Calcium Score of 400 or greater:  (study result reviewed)      ECHOCARDIOGRAM CONCLUSIONS (11/24/21)  Prior echo 1/2/13, compared to the report of the prior study, there has   been no significant change.   Normal left ventricular systolic function.  The left ventricular ejection fraction is visually estimated to be 65%.  Mild aortic insufficiency.  Estimated right ventricular systolic pressure is 25 mmHg.     EKG performed on (09/08/21) EKG shows sinus rhythm with left anterior fascicular block.     Laboratory results of (11/09/23) were reviewed. Cholesterol profile of 173/59/74/87 mg/dL noted.   Laboratory results of (10/25/21) Cholesterol profile of 185/37/85/93 mg/dL noted.  Laboratory results of (09/22/20) Cholesterol profile of 182/44/81/92 mg/dL noted.     MYOCARDIAL PERFUSION STUDY CONCLUSIONS (09/17/21)  NUCLEAR IMAGING INTERPRETATION  Normal exercise myocardial perfusion study.  No evidence of ischemia or infarct.  Diaphragmatic artifact.  Normal LVSF with EF of 73%.  Negative stress ECG for ischemia.  Excellent exercise tolerance. Reggie 17:59. 17.1 METs.   Rare PVC.   ECG  INTERPRETATION  Negative stress ECG for ischemia.     Assessment & Plan     1. Agatston coronary artery calcium score greater than 400        2. Essential hypertension        3. Dyslipidemia        4. PVC (premature ventricular contraction)            Medical Decision Making: Today's Assessment/Status/Plan:        Positive CT coronary calcium study: He is clinically doing well. We will continue with current medical therapy including rosuvastatin.   Hypertension: Blood pressure is well controlled. We will continue with amlodipine, hydrochlorothiazide,clonidine.  Hyperlipidemia: He was recently started on statin per his primary care physician.  Premature ventricular contractions: He is clinically doing well without palpitations.  Medication intolerance: He is intolerant to losartan due to side effects and beta blockade therapy due to bradycardia.    We will follow up the patient in one year.    CC Nimisha Huerta

## 2024-01-29 RX ORDER — HYDROCHLOROTHIAZIDE 25 MG/1
25 TABLET ORAL DAILY
Qty: 90 TABLET | Refills: 3 | Status: SHIPPED | OUTPATIENT
Start: 2024-01-29

## 2024-02-26 RX ORDER — AMLODIPINE BESYLATE 10 MG/1
10 TABLET ORAL
Qty: 90 TABLET | Refills: 3 | Status: SHIPPED | OUTPATIENT
Start: 2024-02-26

## 2024-03-01 DIAGNOSIS — R79.89 LOW TESTOSTERONE LEVEL IN MALE: ICD-10-CM

## 2024-03-01 RX ORDER — TESTOSTERONE 20.25 MG/1.25G
GEL TOPICAL
Qty: 75 G | Refills: 0 | Status: SHIPPED | OUTPATIENT
Start: 2024-03-01 | End: 2024-04-30

## 2024-05-28 DIAGNOSIS — R79.89 LOW TESTOSTERONE LEVEL IN MALE: ICD-10-CM

## 2024-05-28 RX ORDER — TESTOSTERONE 20.25 MG/1.25G
GEL TOPICAL
Qty: 75 G | Refills: 2 | Status: SHIPPED | OUTPATIENT
Start: 2024-05-28 | End: 2024-06-30

## 2024-06-11 ENCOUNTER — APPOINTMENT (RX ONLY)
Dept: URBAN - METROPOLITAN AREA CLINIC 35 | Facility: CLINIC | Age: 69
Setting detail: DERMATOLOGY
End: 2024-06-11

## 2024-06-11 DIAGNOSIS — Z87.2 PERSONAL HISTORY OF DISEASES OF THE SKIN AND SUBCUTANEOUS TISSUE: ICD-10-CM

## 2024-06-11 DIAGNOSIS — Z85.828 PERSONAL HISTORY OF OTHER MALIGNANT NEOPLASM OF SKIN: ICD-10-CM

## 2024-06-11 DIAGNOSIS — B07.8 OTHER VIRAL WARTS: ICD-10-CM

## 2024-06-11 DIAGNOSIS — D22 MELANOCYTIC NEVI: ICD-10-CM

## 2024-06-11 DIAGNOSIS — L82.1 OTHER SEBORRHEIC KERATOSIS: ICD-10-CM

## 2024-06-11 DIAGNOSIS — L57.0 ACTINIC KERATOSIS: ICD-10-CM

## 2024-06-11 DIAGNOSIS — L81.4 OTHER MELANIN HYPERPIGMENTATION: ICD-10-CM

## 2024-06-11 DIAGNOSIS — D18.0 HEMANGIOMA: ICD-10-CM

## 2024-06-11 DIAGNOSIS — Z71.89 OTHER SPECIFIED COUNSELING: ICD-10-CM

## 2024-06-11 PROBLEM — D22.5 MELANOCYTIC NEVI OF TRUNK: Status: ACTIVE | Noted: 2024-06-11

## 2024-06-11 PROBLEM — D18.01 HEMANGIOMA OF SKIN AND SUBCUTANEOUS TISSUE: Status: ACTIVE | Noted: 2024-06-11

## 2024-06-11 PROCEDURE — ? SUNSCREEN RECOMMENDATIONS

## 2024-06-11 PROCEDURE — ? COUNSELING

## 2024-06-11 PROCEDURE — 99213 OFFICE O/P EST LOW 20 MIN: CPT | Mod: 25

## 2024-06-11 PROCEDURE — 17000 DESTRUCT PREMALG LESION: CPT | Mod: 59

## 2024-06-11 PROCEDURE — 17003 DESTRUCT PREMALG LES 2-14: CPT | Mod: 59

## 2024-06-11 PROCEDURE — ? LIQUID NITROGEN

## 2024-06-11 PROCEDURE — 17110 DESTRUCTION B9 LES UP TO 14: CPT | Mod: 52

## 2024-06-11 ASSESSMENT — LOCATION DETAILED DESCRIPTION DERM
LOCATION DETAILED: RIGHT DISTAL RADIAL DORSAL FOREARM
LOCATION DETAILED: LEFT MEDIAL UPPER BACK
LOCATION DETAILED: RIGHT SUPERIOR LATERAL UPPER BACK
LOCATION DETAILED: RIGHT LATERAL PROXIMAL CALF
LOCATION DETAILED: LEFT DISTAL DORSAL FOREARM
LOCATION DETAILED: RIGHT FOREHEAD
LOCATION DETAILED: RIGHT PROXIMAL DORSAL FOREARM
LOCATION DETAILED: LEFT INFERIOR UPPER BACK
LOCATION DETAILED: LEFT LATERAL INFERIOR CHEST
LOCATION DETAILED: RIGHT DISTAL DORSAL FOREARM
LOCATION DETAILED: LEFT PROXIMAL LATERAL PRETIBIAL REGION
LOCATION DETAILED: RIGHT DISTAL LATERAL PRETIBIAL REGION
LOCATION DETAILED: RIGHT DISTAL CALF
LOCATION DETAILED: RIGHT SUPERIOR MEDIAL MIDBACK
LOCATION DETAILED: RIGHT POSTERIOR SHOULDER
LOCATION DETAILED: LEFT DISTAL POSTERIOR UPPER ARM
LOCATION DETAILED: LEFT SUPERIOR UPPER BACK
LOCATION DETAILED: LEFT PROXIMAL PRETIBIAL REGION

## 2024-06-11 ASSESSMENT — LOCATION SIMPLE DESCRIPTION DERM
LOCATION SIMPLE: RIGHT UPPER BACK
LOCATION SIMPLE: RIGHT LOWER LEG
LOCATION SIMPLE: RIGHT SHOULDER
LOCATION SIMPLE: LEFT LOWER LEG
LOCATION SIMPLE: LEFT FOREARM
LOCATION SIMPLE: RIGHT LOWER BACK
LOCATION SIMPLE: LEFT PRETIBIAL REGION
LOCATION SIMPLE: LEFT UPPER BACK
LOCATION SIMPLE: RIGHT FOREHEAD
LOCATION SIMPLE: RIGHT FOREARM
LOCATION SIMPLE: LEFT UPPER ARM
LOCATION SIMPLE: CHEST
LOCATION SIMPLE: RIGHT CALF

## 2024-06-11 ASSESSMENT — LOCATION ZONE DERM
LOCATION ZONE: FACE
LOCATION ZONE: ARM
LOCATION ZONE: TRUNK
LOCATION ZONE: LEG

## 2024-06-11 NOTE — PROCEDURE: LIQUID NITROGEN
Duration Of Freeze Thaw-Cycle (Seconds): 2
Render Post-Care Instructions In Note?: no
Post-Care Instructions: I reviewed with the patient in detail post-care instructions. Patient is to wear sunprotection, and avoid picking at any of the treated lesions. Pt may apply Vaseline to crusted or scabbing areas.
Application Tool (Optional): Cry-AC
Number Of Freeze-Thaw Cycles: 2 freeze-thaw cycles
Consent: The patient's consent was obtained including but not limited to risks of crusting, scabbing, blistering, scarring, darker or lighter pigmentary change, recurrence, incomplete removal and infection.
Show Applicator Variable?: Yes
Detail Level: Detailed
Medical Necessity Information: It is in your best interest to select a reason for this procedure from the list below. All of these items fulfill various CMS LCD requirements except the new and changing color options.
Consent: The patient's verbal consent was obtained including but not limited to risks of crusting, scabbing, blistering, scarring, darker or lighter pigmentary change, recurrence, incomplete removal and infection.
Medical Necessity Clause: This procedure was medically necessary because the lesions that were treated were:
Spray Paint Text: The liquid nitrogen was applied to the skin utilizing a spray paint frosting technique.

## 2024-08-20 ENCOUNTER — TELEPHONE (OUTPATIENT)
Dept: INTERNAL MEDICINE | Facility: IMAGING CENTER | Age: 69
End: 2024-08-20

## 2024-08-20 ENCOUNTER — OFFICE VISIT (OUTPATIENT)
Dept: INTERNAL MEDICINE | Facility: IMAGING CENTER | Age: 69
End: 2024-08-20
Payer: MEDICARE

## 2024-08-20 VITALS
RESPIRATION RATE: 14 BRPM | TEMPERATURE: 97.5 F | HEART RATE: 61 BPM | OXYGEN SATURATION: 97 % | SYSTOLIC BLOOD PRESSURE: 142 MMHG | BODY MASS INDEX: 25.05 KG/M2 | WEIGHT: 189 LBS | DIASTOLIC BLOOD PRESSURE: 76 MMHG | HEIGHT: 73 IN

## 2024-08-20 DIAGNOSIS — R79.89 LOW TESTOSTERONE LEVEL IN MALE: ICD-10-CM

## 2024-08-20 DIAGNOSIS — E78.5 DYSLIPIDEMIA: ICD-10-CM

## 2024-08-20 DIAGNOSIS — T88.7XXA MEDICATION SIDE EFFECTS: ICD-10-CM

## 2024-08-20 DIAGNOSIS — Z79.899 MEDICATION MANAGEMENT: ICD-10-CM

## 2024-08-20 DIAGNOSIS — I10 ESSENTIAL HYPERTENSION: Chronic | ICD-10-CM

## 2024-08-20 PROCEDURE — 3078F DIAST BP <80 MM HG: CPT | Performed by: FAMILY MEDICINE

## 2024-08-20 PROCEDURE — 3077F SYST BP >= 140 MM HG: CPT | Performed by: FAMILY MEDICINE

## 2024-08-20 PROCEDURE — 99214 OFFICE O/P EST MOD 30 MIN: CPT | Performed by: FAMILY MEDICINE

## 2024-08-20 ASSESSMENT — FIBROSIS 4 INDEX: FIB4 SCORE: 1.2

## 2024-08-20 NOTE — TELEPHONE ENCOUNTER
----- Message from Nurse Shazia WORRELL R.N. sent at 8/20/2024  4:27 PM PDT -----  Regarding: Lab Orders  Evan Dove is coming in Wednesday morning for lab work following his appointment with you today.  Please place orders.  Thank you,  Toma

## 2024-08-21 ENCOUNTER — NON-PROVIDER VISIT (OUTPATIENT)
Dept: INTERNAL MEDICINE | Facility: IMAGING CENTER | Age: 69
End: 2024-08-21
Payer: MEDICARE

## 2024-08-21 ENCOUNTER — HOSPITAL ENCOUNTER (OUTPATIENT)
Facility: MEDICAL CENTER | Age: 69
End: 2024-08-21
Attending: FAMILY MEDICINE
Payer: MEDICARE

## 2024-08-21 DIAGNOSIS — Z79.899 MEDICATION MANAGEMENT: ICD-10-CM

## 2024-08-21 DIAGNOSIS — R79.89 LOW TESTOSTERONE LEVEL IN MALE: ICD-10-CM

## 2024-08-21 DIAGNOSIS — Z01.89 ENCOUNTER FOR ROUTINE LABORATORY TESTING: ICD-10-CM

## 2024-08-21 LAB
25(OH)D3 SERPL-MCNC: 100 NG/ML (ref 30–100)
ALBUMIN SERPL BCP-MCNC: 5.1 G/DL (ref 3.2–4.9)
ALBUMIN/GLOB SERPL: 3.6 G/DL
ALP SERPL-CCNC: 58 U/L (ref 30–99)
ALT SERPL-CCNC: 39 U/L (ref 2–50)
ANION GAP SERPL CALC-SCNC: 14 MMOL/L (ref 7–16)
AST SERPL-CCNC: 20 U/L (ref 12–45)
BILIRUB SERPL-MCNC: 0.8 MG/DL (ref 0.1–1.5)
BUN SERPL-MCNC: 21 MG/DL (ref 8–22)
CALCIUM ALBUM COR SERPL-MCNC: 9.4 MG/DL (ref 8.5–10.5)
CALCIUM SERPL-MCNC: 10.3 MG/DL (ref 8.5–10.5)
CHLORIDE SERPL-SCNC: 102 MMOL/L (ref 96–112)
CHOLEST SERPL-MCNC: 176 MG/DL (ref 100–199)
CO2 SERPL-SCNC: 24 MMOL/L (ref 20–33)
CREAT SERPL-MCNC: 0.84 MG/DL (ref 0.5–1.4)
ERYTHROCYTE [DISTWIDTH] IN BLOOD BY AUTOMATED COUNT: 42.7 FL (ref 35.9–50)
EST. AVERAGE GLUCOSE BLD GHB EST-MCNC: 100 MG/DL
GFR SERPLBLD CREATININE-BSD FMLA CKD-EPI: 93 ML/MIN/1.73 M 2
GLOBULIN SER CALC-MCNC: 1.4 G/DL (ref 1.9–3.5)
GLUCOSE SERPL-MCNC: 90 MG/DL (ref 65–99)
HBA1C MFR BLD: 5.1 % (ref 4–5.6)
HCT VFR BLD AUTO: 54.5 % (ref 42–52)
HDLC SERPL-MCNC: 83 MG/DL
HGB BLD-MCNC: 19 G/DL (ref 14–18)
LDLC SERPL CALC-MCNC: 84 MG/DL
MCH RBC QN AUTO: 31.7 PG (ref 27–33)
MCHC RBC AUTO-ENTMCNC: 34.9 G/DL (ref 32.3–36.5)
MCV RBC AUTO: 91 FL (ref 81.4–97.8)
PLATELET # BLD AUTO: 253 K/UL (ref 164–446)
PMV BLD AUTO: 10.2 FL (ref 9–12.9)
POTASSIUM SERPL-SCNC: 3.9 MMOL/L (ref 3.6–5.5)
PROT SERPL-MCNC: 6.5 G/DL (ref 6–8.2)
RBC # BLD AUTO: 5.99 M/UL (ref 4.7–6.1)
SODIUM SERPL-SCNC: 140 MMOL/L (ref 135–145)
TRIGL SERPL-MCNC: 45 MG/DL (ref 0–149)
TSH SERPL-ACNC: 2.9 UIU/ML (ref 0.35–5.5)
VIT B12 SERPL-MCNC: 780 PG/ML (ref 211–911)
WBC # BLD AUTO: 5.9 K/UL (ref 4.8–10.8)

## 2024-08-21 PROCEDURE — 82607 VITAMIN B-12: CPT

## 2024-08-21 PROCEDURE — 84403 ASSAY OF TOTAL TESTOSTERONE: CPT

## 2024-08-21 PROCEDURE — 85027 COMPLETE CBC AUTOMATED: CPT

## 2024-08-21 PROCEDURE — 83036 HEMOGLOBIN GLYCOSYLATED A1C: CPT

## 2024-08-21 PROCEDURE — 80053 COMPREHEN METABOLIC PANEL: CPT

## 2024-08-21 PROCEDURE — 84270 ASSAY OF SEX HORMONE GLOBUL: CPT

## 2024-08-21 PROCEDURE — 80061 LIPID PANEL: CPT

## 2024-08-21 PROCEDURE — 84402 ASSAY OF FREE TESTOSTERONE: CPT

## 2024-08-21 PROCEDURE — 82306 VITAMIN D 25 HYDROXY: CPT

## 2024-08-21 PROCEDURE — 84443 ASSAY THYROID STIM HORMONE: CPT

## 2024-08-21 PROCEDURE — 99999 PR NO CHARGE: CPT

## 2024-08-24 LAB
SHBG SERPL-SCNC: 60 NMOL/L (ref 19–76)
TESTOST FREE MFR SERPL: 1.4 % (ref 1.6–2.9)
TESTOST FREE SERPL-MCNC: 107 PG/ML (ref 47–244)
TESTOST SERPL-MCNC: 767 NG/DL (ref 300–720)

## 2024-08-26 ENCOUNTER — PATIENT MESSAGE (OUTPATIENT)
Dept: INTERNAL MEDICINE | Facility: IMAGING CENTER | Age: 69
End: 2024-08-26
Payer: MEDICARE

## 2024-09-11 RX ORDER — TESTOSTERONE 20.25 MG/1.25G
GEL TOPICAL
COMMUNITY
Start: 2024-07-28

## 2024-09-12 ENCOUNTER — OFFICE VISIT (OUTPATIENT)
Dept: CARDIOLOGY | Facility: MEDICAL CENTER | Age: 69
End: 2024-09-12
Attending: INTERNAL MEDICINE
Payer: MEDICARE

## 2024-09-12 VITALS
WEIGHT: 186.6 LBS | HEART RATE: 53 BPM | OXYGEN SATURATION: 95 % | HEIGHT: 72 IN | SYSTOLIC BLOOD PRESSURE: 118 MMHG | BODY MASS INDEX: 25.27 KG/M2 | DIASTOLIC BLOOD PRESSURE: 72 MMHG | RESPIRATION RATE: 16 BRPM

## 2024-09-12 DIAGNOSIS — E78.5 DYSLIPIDEMIA: ICD-10-CM

## 2024-09-12 DIAGNOSIS — I10 ESSENTIAL HYPERTENSION: Chronic | ICD-10-CM

## 2024-09-12 DIAGNOSIS — R93.1 AGATSTON CORONARY ARTERY CALCIUM SCORE GREATER THAN 400: ICD-10-CM

## 2024-09-12 DIAGNOSIS — I49.3 PVC (PREMATURE VENTRICULAR CONTRACTION): ICD-10-CM

## 2024-09-12 PROCEDURE — 3074F SYST BP LT 130 MM HG: CPT | Performed by: INTERNAL MEDICINE

## 2024-09-12 PROCEDURE — 99213 OFFICE O/P EST LOW 20 MIN: CPT | Performed by: INTERNAL MEDICINE

## 2024-09-12 PROCEDURE — 99214 OFFICE O/P EST MOD 30 MIN: CPT | Performed by: INTERNAL MEDICINE

## 2024-09-12 PROCEDURE — 3078F DIAST BP <80 MM HG: CPT | Performed by: INTERNAL MEDICINE

## 2024-09-12 RX ORDER — ATORVASTATIN CALCIUM 10 MG/1
10 TABLET, FILM COATED ORAL NIGHTLY
Qty: 30 TABLET | Refills: 11 | Status: SHIPPED | OUTPATIENT
Start: 2024-09-12

## 2024-09-12 ASSESSMENT — ENCOUNTER SYMPTOMS
WEAKNESS: 0
MYALGIAS: 0
NERVOUS/ANXIOUS: 0
RESPIRATORY NEGATIVE: 1
COUGH: 0
DEPRESSION: 0
DOUBLE VISION: 0
BLURRED VISION: 0
BRUISES/BLEEDS EASILY: 0
CONSTITUTIONAL NEGATIVE: 1
WEIGHT LOSS: 0
PALPITATIONS: 0
CARDIOVASCULAR NEGATIVE: 1
NAUSEA: 0
CLAUDICATION: 0
EYES NEGATIVE: 1
VOMITING: 0
GASTROINTESTINAL NEGATIVE: 1
SHORTNESS OF BREATH: 0
FEVER: 0
CHILLS: 0
DIZZINESS: 0
FOCAL WEAKNESS: 0
MUSCULOSKELETAL NEGATIVE: 1
NEUROLOGICAL NEGATIVE: 1
HEADACHES: 0
ABDOMINAL PAIN: 0
PSYCHIATRIC NEGATIVE: 1

## 2024-09-12 ASSESSMENT — FIBROSIS 4 INDEX: FIB4 SCORE: 0.89

## 2024-09-12 NOTE — PROGRESS NOTES
Chief Complaint   Patient presents with    Palpitations    Other     F/V Dx: Agatston coronary artery calcium score greater than 400       Subjective     Evan Wright is a 68 y.o. male who presents today for follow up of positive CT coronary calcium study, hypertension and hyperlipidemia, premature ventricular contractions.    Since the patient's last visit on 01/22/24, he has been doing well clinically from cardiac standpoint. He denies fatigue, chest pain, shortness of breath, palpitations, lower extremity edema, dizziness or syncope. He keeps active exercising exercising with cycling or weight, weight lifting. He could not tolerate rosuvastatin due to myalgia.     Past Medical History:   Diagnosis Date    CAD (coronary artery disease)     Hyperlipidemia     Hypertension     Insomnia     Testosterone insufficiency      Past Surgical History:   Procedure Laterality Date    FOOT SURGERY Left 2010    left foot neuroma     Family History   Problem Relation Age of Onset    Hypertension Mother     Cancer Father         liver CA    Alcohol/Drug Father     Hypertension Father     No Known Problems Son     No Known Problems Daughter     Heart Disease Neg Hx      Social History     Socioeconomic History    Marital status:      Spouse name: Not on file    Number of children: 2    Years of education: Not on file    Highest education level: Not on file   Occupational History    Occupation: Retired   Tobacco Use    Smoking status: Never    Smokeless tobacco: Never   Vaping Use    Vaping status: Never Used   Substance and Sexual Activity    Alcohol use: Yes     Comment: wine daily 1-2/nt    Drug use: No    Sexual activity: Yes     Partners: Female     Birth control/protection: Post-Menopausal   Other Topics Concern    Not on file   Social History Narrative    Retired business owner, 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      2 grown kids     Social Determinants of Health     Financial Resource Strain: Not on file   Food Insecurity:  Not on file   Transportation Needs: No Transportation Needs (6/7/2021)    PRAPARE - Transportation     Lack of Transportation (Medical): No     Lack of Transportation (Non-Medical): No   Physical Activity: Sufficiently Active (6/7/2021)    Exercise Vital Sign     Days of Exercise per Week: 7 days     Minutes of Exercise per Session: 40 min   Stress: Not on file   Social Connections: Not on file   Intimate Partner Violence: Not on file   Housing Stability: Not on file     No Known Allergies  Outpatient Encounter Medications as of 9/12/2024   Medication Sig Dispense Refill    atorvastatin (LIPITOR) 10 MG Tab Take 1 Tablet by mouth every evening. 30 Tablet 11    Testosterone 1.62 % Gel APPLY 1 PUMP TOPICALLY TO THE AFFECTED AREA EVERY DAY AS NEEDED      amLODIPine (NORVASC) 10 MG Tab TAKE 1 TABLET BY MOUTH EVERY DAY 90 Tablet 3    hydroCHLOROthiazide 25 MG Tab TAKE 1 TABLET BY MOUTH EVERY DAY 90 Tablet 3    ASPIRIN 81 PO Take  by mouth. 1 tab every day      cloNIDine (CATAPRES) 0.1 MG Tab TAKE 1 TABLET BY MOUTH TWICE DAILY 180 Tablet 3    clobetasol (TEMOVATE) 0.05 % Cream Apply topically twice a day 45 g 2    fluticasone (FLONASE) 50 MCG/ACT nasal spray Administer 1 Spray into affected nostril(S) every day.      desonide (TRIDESILON) 0.05 % Cream       tadalafil (CIALIS) 20 MG tablet Take 1 Tab by mouth as needed for Erectile Dysfunction (Max 1 dose per 24 hrs). 10 Tab 6    Ascorbic Acid (VITAMIN C PO) Take  by mouth.      Cholecalciferol (VITAMIN D) 2000 units Cap Take 1 Capsule by mouth every day.      B Complex Vitamins (B COMPLEX 1 PO) Take 1 Tab by mouth every day.       No facility-administered encounter medications on file as of 9/12/2024.     Review of Systems   Constitutional: Negative.  Negative for chills, fever, malaise/fatigue and weight loss.   HENT: Negative.  Negative for hearing loss.    Eyes: Negative.  Negative for blurred vision and double vision.   Respiratory: Negative.  Negative for cough and  shortness of breath.    Cardiovascular: Negative.  Negative for chest pain, palpitations, claudication and leg swelling.   Gastrointestinal: Negative.  Negative for abdominal pain, nausea and vomiting.   Genitourinary: Negative.  Negative for dysuria and urgency.   Musculoskeletal: Negative.  Negative for joint pain and myalgias.   Skin: Negative.  Negative for itching and rash.   Neurological: Negative.  Negative for dizziness, focal weakness, weakness and headaches.   Endo/Heme/Allergies: Negative.  Does not bruise/bleed easily.   Psychiatric/Behavioral: Negative.  Negative for depression. The patient is not nervous/anxious.               Objective     /72 (BP Location: Left arm, Patient Position: Sitting, BP Cuff Size: Adult)   Pulse (!) 53   Resp 16   Ht 1.829 m (6')   Wt 84.6 kg (186 lb 9.6 oz)   SpO2 95%   BMI 25.31 kg/m²     Physical Exam  Constitutional:       Appearance: Normal appearance. He is well-developed and normal weight.   HENT:      Head: Normocephalic and atraumatic.   Neck:      Vascular: No JVD.   Cardiovascular:      Rate and Rhythm: Normal rate and regular rhythm.      Heart sounds: Normal heart sounds.   Pulmonary:      Effort: Pulmonary effort is normal.      Breath sounds: Normal breath sounds.   Abdominal:      General: Bowel sounds are normal.      Palpations: Abdomen is soft.      Comments: No hepatosplenomegaly.   Musculoskeletal:         General: Normal range of motion.   Lymphadenopathy:      Cervical: No cervical adenopathy.   Skin:     General: Skin is warm and dry.   Neurological:      Mental Status: He is alert and oriented to person, place, and time.            CARDIAC STUDIES/PROCEDURES:     BIOTEL CONCLUSIONS (07/26/21-08/24/21)   BioTel study showing predominately sinus rhythm with maximum rate of 133 bpm, minimum rate of 50 bpm, average of 62 bpm.   Atrial fibrillation: None.   Supraventricular tachycardia: None.   Pauses: None.   Heart block: None.   Ventricular  tachycardia: 3 episodes with fastest run of 126 bpm of 4 beats and longest run on 18 beats.   1% burden of premature ventricular contractions and 1% burden of premature atrial contractions.   Symptoms associated with sinus tachycardia with premature ventricular contractions.      CT CARDIAC SCORING (12/05/23)  Coronary calcification:  LMA - 198.4  LCX - 0.0  LAD - 1242.2  RCA - 84.4  Total Calcium Score: 1524.9  Percentile: Calcium score is above the 90th percentile for the patient's age and sex.  IMPRESSION:  1.  Changes in the esophagus and adjacent posterior mediastinum could be due to inflammatory disease or as likely underlying mass. Gastroenterology evaluation is recommended.  2.  Calcium Score of 400 or greater:    ECHOCARDIOGRAM CONCLUSIONS (11/24/21)  Prior echo 1/2/13, compared to the report of the prior study, there has   been no significant change.   Normal left ventricular systolic function.  The left ventricular ejection fraction is visually estimated to be 65%.  Mild aortic insufficiency.  Estimated right ventricular systolic pressure is 25 mmHg.     EKG performed on (09/08/21) EKG shows sinus rhythm with left anterior fascicular block.     Laboratory results of (08/21/24) were reviewed. Cholesterol profile of 176/45/83/84 mg/dL noted.  Laboratory results of (11/09/23) Cholesterol profile of 173/59/74/87 mg/dL noted.   Laboratory results of (10/25/21) Cholesterol profile of 185/37/85/93 mg/dL noted.  Laboratory results of (09/22/20) Cholesterol profile of 182/44/81/92 mg/dL noted.     MYOCARDIAL PERFUSION STUDY CONCLUSIONS (09/17/21)  NUCLEAR IMAGING INTERPRETATION  Normal exercise myocardial perfusion study.  No evidence of ischemia or infarct.  Diaphragmatic artifact.  Normal LVSF with EF of 73%.  Negative stress ECG for ischemia.  Excellent exercise tolerance. Reggie 17:59. 17.1 METs.   Rare PVC.   ECG INTERPRETATION  Negative stress ECG for ischemia.       Assessment & Plan     1. Agatston coronary  artery calcium score greater than 400        2. Essential hypertension        3. Dyslipidemia  atorvastatin (LIPITOR) 10 MG Tab      4. PVC (premature ventricular contraction)            Medical Decision Making: Today's Assessment/Status/Plan:        Positive CT coronary calcium study: He is a 68 year old male with positive CT coronary calcium study, hypertension and hyperlipidemia, premature ventricular contractions. He is clinically doing well from coronary standpoint. We will continue with current medical therapy including rosuvastatin.  Hypertension: Blood pressure is well controlled. We will continue with amlodipine, hydrochlorothiazide, clonidine.  Hyperlipidemia: He could not tolerate rosuvastatin due to myalgia. We will start atorvastatin. We will repeat labs including fasting lipid profile in 3 months.   Premature ventricular contractions: He is clinically doing well without palpitations.  Medication intolerance: He is intolerant to losartan due to side effects and beta blockade therapy due to bradycardia.    We will follow up in 3 months.    CC Nimisha Morales

## 2024-09-13 NOTE — TELEPHONE ENCOUNTER
Is the patient due for a refill? Yes    Was the patient seen the past year? Yes    Date of last office visit: 09/12/2024    Does the patient have an upcoming appointment?  Yes   If yes, When? 12/19/2024    Provider to refill:[facility]    Does the patient have assisted Plus and need 100-day supply? (This applies to ALL medications) Patient does not have SCP

## 2024-09-16 RX ORDER — ATORVASTATIN CALCIUM 10 MG/1
10 TABLET, FILM COATED ORAL NIGHTLY
Qty: 90 TABLET | Refills: 0 | OUTPATIENT
Start: 2024-09-16

## 2024-09-18 NOTE — PROGRESS NOTES
Verbal consent was acquired by the patient to use [employer] ambient listening note generation during this visit     Patient is a 68 y.o.male.established patient     History of Present Illness  The patient is a 68-year-old male with a history of coronary artery disease, hypertension, hyperlipidemia, testosterone insufficiency, and insomnia. He has been experiencing side effects with rosuvastatin and wishes to discuss this today.    He began taking rosuvastatin in November or December 2023 following a CT scan that revealed arterial plaque buildup. Initially, he did not experience any side effects from the medication. However, as the weather warmed, he noticed a significant decrease in energy, despite maintaining his usual activities such as golf, cycling, hiking, and yard work. His sleep quality, which was already poor, worsened, and he began to experience muscle aches, a symptom he had not previously encountered. These symptoms have progressively worsened over the past few days, culminating in severe muscle aches after a bike ride yesterday. He has been on rosuvastatin for 8 to 9 months and is unsure if it has improved or stabilized his condition. He is scheduled to see his cardiologist in 3 weeks. His sleep disturbances, characterized by waking up 3 to 4 times a night, have led to feelings of irritability and depression. He took his last dose of rosuvastatin last night. He also reports feeling more fatigued than usual, but denies any abdominal pain.    He was put on clonidine to help with his blood pressure because he lost control of it. His blood pressure is usually good and he checks it once or twice a week and it has been somewhere between 125 and 130 over 75. He does it usually in the morning sitting in bed or while reading.    The CT scan also showed something at the bottom of his throat. In February or March, they did an endoscopy on him and Dr. Levine said there was some inflammation. They put him on  "omeprazole and that was a miracle drug for him. He has been choking and gagging on things for 20 years and once he started on that, he could eat dry chewing fish or turkey. They wanted to do a follow-up endoscopy and that was just 3 weeks ago. Since he had the colonoscopy due in November or December, he asked them if they could do both and they agreed. He got over-anesthetized and he does not remember anything till about noon or 1:00 that day. He does not know how he got home. He did not drive. He called their office and gave them his name and they told him they would figure it out and call him back, but he never got a call back. A day or two later, he got an email with the biopsy or pathology report  was nl. From what he can see, the colonoscopy was good except for some hemorrhoids and the endoscopy seemed okay. He has had a hiatal hernia forever. The only aspirin he takes right now is 81 mg.            BP (!) 142/76   Pulse 61   Temp 36.4 °C (97.5 °F)   Resp 14   Ht 1.854 m (6' 0.99\")   Wt 85.7 kg (189 lb)   SpO2 97% , Body mass index is 24.94 kg/m².    Physical Exam      Physical Exam  Constitutional:       General: He is not in acute distress.     Appearance: Normal appearance. He is normal weight. He is not ill-appearing, toxic-appearing or diaphoretic.   HENT:      Head: Normocephalic and atraumatic.      Ears:      Comments:       Nose: Nose normal.   Eyes:      Conjunctiva/sclera: Conjunctivae normal.   Cardiovascular:      Rate and Rhythm: Normal rate.   Pulmonary:      Effort: Pulmonary effort is normal.   Musculoskeletal:      Cervical back: Neck supple.   Neurological:      General: No focal deficit present.      Mental Status: He is alert.   Psychiatric:         Mood and Affect: Mood normal.         Behavior: Behavior normal.         Thought Content: Thought content normal.         Judgment: Judgment normal.             Results  Laboratory Studies  Cholesterol was 87 nine months " ago.    Imaging  CT cardioscan showed calcification score of 1242 in the left anterior descending artery. Total calcification score was 1525. Changes in the esophagus and adjacent mediastinum could be due to inflammation.    Testing  Endoscopy revealed short segment Ingram's and a hiatal hernia. Biopsy showed no dysplasia.          Assessment & Plan  1. Hyperlipidemia./med side effects  Symptoms suggest a possible adverse reaction to rosuvastatin, including muscle aches and fatigue. A temporary cessation of rosuvastatin is recommended to assess if symptoms improve. A fasting blood draw will be conducted tomorrow to evaluate the impact of statins on cholesterol levels and liver function. If symptoms improve, Repatha, an injectable medication, may be considered as an alternative. He is advised to discuss this option with his cardiologist in 3 weeks and provide a summary of the visit afterward.    2. Ingram's Esophagus.  The recent endoscopy revealed short segment Ingram's esophagus. No dysplasia was found in the biopsy, indicating no cancerous cells. A follow-up upper endoscopy is recommended in 3 years to monitor for any changes.    3. Hypertension.  Blood pressure readings are generally well-controlled, with recent measurements around 125-130/75 mmHg. Continue current management and monitor blood pressure regularly.    4. Insomnia.  Reported difficulty sleeping, potentially exacerbated by muscle aches from rosuvastatin. Monitoring sleep patterns and reassessing after discontinuation of rosuvastatin is recommended.    5. Health Maintenance.  A follow-up colonoscopy is recommended in 5 years, as the recent colonoscopy showed no polyps but internal hemorrhoids.          1. Medication side effects    2. Dyslipidemia    3. Essential hypertension    4. Low testosterone level in male  - Testosterone, Free & Total, Adult Male (w/SHBG); Future             This note was created using voice recognition software (Dragon).  The accuracy of the dictation is limited by the abilities of the software. I have reviewed the note prior to signing, however some errors in grammar and context are still possible. If you have any questions related to this note please do not hesitate to contact our office.

## 2024-10-02 ENCOUNTER — NON-PROVIDER VISIT (OUTPATIENT)
Dept: INTERNAL MEDICINE | Facility: IMAGING CENTER | Age: 69
End: 2024-10-02
Payer: MEDICARE

## 2024-10-02 DIAGNOSIS — Z23 NEED FOR VACCINATION: ICD-10-CM

## 2024-10-02 PROCEDURE — G0008 ADMIN INFLUENZA VIRUS VAC: HCPCS | Performed by: FAMILY MEDICINE

## 2024-10-02 PROCEDURE — 90662 IIV NO PRSV INCREASED AG IM: CPT | Performed by: FAMILY MEDICINE

## 2024-11-25 ENCOUNTER — TELEPHONE (OUTPATIENT)
Dept: INTERNAL MEDICINE | Facility: IMAGING CENTER | Age: 69
End: 2024-11-25
Payer: MEDICARE

## 2024-11-25 DIAGNOSIS — Z79.899 MEDICATION MANAGEMENT: ICD-10-CM

## 2024-11-25 DIAGNOSIS — Z12.5 PROSTATE CANCER SCREENING: ICD-10-CM

## 2024-11-26 ENCOUNTER — NON-PROVIDER VISIT (OUTPATIENT)
Dept: INTERNAL MEDICINE | Facility: IMAGING CENTER | Age: 69
End: 2024-11-26
Payer: MEDICARE

## 2024-11-26 ENCOUNTER — HOSPITAL ENCOUNTER (OUTPATIENT)
Facility: MEDICAL CENTER | Age: 69
End: 2024-11-26
Attending: FAMILY MEDICINE
Payer: MEDICARE

## 2024-11-26 DIAGNOSIS — Z12.5 PROSTATE CANCER SCREENING: ICD-10-CM

## 2024-11-26 DIAGNOSIS — Z79.899 MEDICATION MANAGEMENT: ICD-10-CM

## 2024-11-26 LAB
25(OH)D3 SERPL-MCNC: 72 NG/ML (ref 30–100)
ALBUMIN SERPL BCP-MCNC: 4.7 G/DL (ref 3.2–4.9)
ALBUMIN/GLOB SERPL: 2.1 G/DL
ALP SERPL-CCNC: 68 U/L (ref 30–99)
ALT SERPL-CCNC: 29 U/L (ref 2–50)
ANION GAP SERPL CALC-SCNC: 13 MMOL/L (ref 7–16)
AST SERPL-CCNC: 26 U/L (ref 12–45)
BILIRUB SERPL-MCNC: 0.9 MG/DL (ref 0.1–1.5)
BUN SERPL-MCNC: 27 MG/DL (ref 8–22)
CALCIUM ALBUM COR SERPL-MCNC: 9.2 MG/DL (ref 8.5–10.5)
CALCIUM SERPL-MCNC: 9.8 MG/DL (ref 8.5–10.5)
CHLORIDE SERPL-SCNC: 102 MMOL/L (ref 96–112)
CHOLEST SERPL-MCNC: 137 MG/DL (ref 100–199)
CO2 SERPL-SCNC: 26 MMOL/L (ref 20–33)
CREAT SERPL-MCNC: 1.41 MG/DL (ref 0.5–1.4)
ERYTHROCYTE [DISTWIDTH] IN BLOOD BY AUTOMATED COUNT: 39 FL (ref 35.9–50)
EST. AVERAGE GLUCOSE BLD GHB EST-MCNC: 111 MG/DL
GFR SERPLBLD CREATININE-BSD FMLA CKD-EPI: 53 ML/MIN/1.73 M 2
GLOBULIN SER CALC-MCNC: 2.2 G/DL (ref 1.9–3.5)
GLUCOSE SERPL-MCNC: 87 MG/DL (ref 65–99)
HBA1C MFR BLD: 5.5 % (ref 4–5.6)
HCT VFR BLD AUTO: 44.1 % (ref 42–52)
HDLC SERPL-MCNC: 65 MG/DL
HGB BLD-MCNC: 15.4 G/DL (ref 14–18)
LDLC SERPL CALC-MCNC: 59 MG/DL
MCH RBC QN AUTO: 30.6 PG (ref 27–33)
MCHC RBC AUTO-ENTMCNC: 34.9 G/DL (ref 32.3–36.5)
MCV RBC AUTO: 87.5 FL (ref 81.4–97.8)
PLATELET # BLD AUTO: 273 K/UL (ref 164–446)
PMV BLD AUTO: 10.3 FL (ref 9–12.9)
POTASSIUM SERPL-SCNC: 3.6 MMOL/L (ref 3.6–5.5)
PROT SERPL-MCNC: 6.9 G/DL (ref 6–8.2)
PSA SERPL DL<=0.01 NG/ML-MCNC: 3.19 NG/ML (ref 0–4)
RBC # BLD AUTO: 5.04 M/UL (ref 4.7–6.1)
SODIUM SERPL-SCNC: 141 MMOL/L (ref 135–145)
TRIGL SERPL-MCNC: 64 MG/DL (ref 0–149)
TSH SERPL-ACNC: 2.83 UIU/ML (ref 0.35–5.5)
VIT B12 SERPL-MCNC: 616 PG/ML (ref 211–911)
WBC # BLD AUTO: 6.4 K/UL (ref 4.8–10.8)

## 2024-11-26 PROCEDURE — 83036 HEMOGLOBIN GLYCOSYLATED A1C: CPT

## 2024-11-26 PROCEDURE — 84402 ASSAY OF FREE TESTOSTERONE: CPT

## 2024-11-26 PROCEDURE — 84153 ASSAY OF PSA TOTAL: CPT

## 2024-11-26 PROCEDURE — 80061 LIPID PANEL: CPT

## 2024-11-26 PROCEDURE — 82607 VITAMIN B-12: CPT

## 2024-11-26 PROCEDURE — 99999 PR NO CHARGE: CPT

## 2024-11-26 PROCEDURE — 80053 COMPREHEN METABOLIC PANEL: CPT

## 2024-11-26 PROCEDURE — 84443 ASSAY THYROID STIM HORMONE: CPT

## 2024-11-26 PROCEDURE — 82306 VITAMIN D 25 HYDROXY: CPT

## 2024-11-26 PROCEDURE — 84270 ASSAY OF SEX HORMONE GLOBUL: CPT

## 2024-11-26 PROCEDURE — 84403 ASSAY OF TOTAL TESTOSTERONE: CPT

## 2024-11-26 PROCEDURE — 85027 COMPLETE CBC AUTOMATED: CPT

## 2024-11-26 NOTE — TELEPHONE ENCOUNTER
----- Message from Nurse Edie QUILES R.N. sent at 11/25/2024  2:33 PM PST -----  Please add fasting labs.    Thanks!

## 2024-11-29 LAB
SHBG SERPL-SCNC: 47 NMOL/L (ref 19–76)
TESTOST FREE MFR SERPL: 1.5 % (ref 1.6–2.9)
TESTOST FREE SERPL-MCNC: 78 PG/ML (ref 47–244)
TESTOST SERPL-MCNC: 509 NG/DL (ref 300–720)

## 2024-12-03 ENCOUNTER — OFFICE VISIT (OUTPATIENT)
Dept: INTERNAL MEDICINE | Facility: IMAGING CENTER | Age: 69
End: 2024-12-03
Payer: MEDICARE

## 2024-12-03 ENCOUNTER — HOSPITAL ENCOUNTER (OUTPATIENT)
Facility: MEDICAL CENTER | Age: 69
End: 2024-12-03
Attending: FAMILY MEDICINE
Payer: MEDICARE

## 2024-12-03 VITALS
BODY MASS INDEX: 25.45 KG/M2 | HEART RATE: 67 BPM | SYSTOLIC BLOOD PRESSURE: 134 MMHG | WEIGHT: 192 LBS | RESPIRATION RATE: 14 BRPM | OXYGEN SATURATION: 98 % | DIASTOLIC BLOOD PRESSURE: 70 MMHG | HEIGHT: 73 IN | TEMPERATURE: 97.8 F

## 2024-12-03 DIAGNOSIS — I10 ESSENTIAL HYPERTENSION: Chronic | ICD-10-CM

## 2024-12-03 DIAGNOSIS — R94.4 DECREASED GFR: ICD-10-CM

## 2024-12-03 DIAGNOSIS — E78.5 DYSLIPIDEMIA: ICD-10-CM

## 2024-12-03 DIAGNOSIS — Z00.00 MEDICARE ANNUAL WELLNESS VISIT, SUBSEQUENT: Primary | ICD-10-CM

## 2024-12-03 LAB
ALBUMIN SERPL BCP-MCNC: 4.8 G/DL (ref 3.2–4.9)
ALBUMIN/GLOB SERPL: 3.7 G/DL
ALP SERPL-CCNC: 62 U/L (ref 30–99)
ALT SERPL-CCNC: 36 U/L (ref 2–50)
ANION GAP SERPL CALC-SCNC: 13 MMOL/L (ref 7–16)
AST SERPL-CCNC: 24 U/L (ref 12–45)
BILIRUB SERPL-MCNC: 0.7 MG/DL (ref 0.1–1.5)
BUN SERPL-MCNC: 25 MG/DL (ref 8–22)
CALCIUM ALBUM COR SERPL-MCNC: 9.2 MG/DL (ref 8.5–10.5)
CALCIUM SERPL-MCNC: 9.8 MG/DL (ref 8.5–10.5)
CHLORIDE SERPL-SCNC: 103 MMOL/L (ref 96–112)
CO2 SERPL-SCNC: 26 MMOL/L (ref 20–33)
CREAT SERPL-MCNC: 1.34 MG/DL (ref 0.5–1.4)
GFR SERPLBLD CREATININE-BSD FMLA CKD-EPI: 57 ML/MIN/1.73 M 2
GLOBULIN SER CALC-MCNC: 1.3 G/DL (ref 1.9–3.5)
GLUCOSE SERPL-MCNC: 66 MG/DL (ref 65–99)
POTASSIUM SERPL-SCNC: 3.7 MMOL/L (ref 3.6–5.5)
PROT SERPL-MCNC: 6.1 G/DL (ref 6–8.2)
SODIUM SERPL-SCNC: 142 MMOL/L (ref 135–145)

## 2024-12-03 PROCEDURE — 3078F DIAST BP <80 MM HG: CPT | Performed by: FAMILY MEDICINE

## 2024-12-03 PROCEDURE — 80053 COMPREHEN METABOLIC PANEL: CPT

## 2024-12-03 PROCEDURE — 3075F SYST BP GE 130 - 139MM HG: CPT | Performed by: FAMILY MEDICINE

## 2024-12-03 PROCEDURE — G0439 PPPS, SUBSEQ VISIT: HCPCS | Performed by: FAMILY MEDICINE

## 2024-12-03 ASSESSMENT — ENCOUNTER SYMPTOMS: GENERAL WELL-BEING: EXCELLENT

## 2024-12-03 ASSESSMENT — PATIENT HEALTH QUESTIONNAIRE - PHQ9: CLINICAL INTERPRETATION OF PHQ2 SCORE: 0

## 2024-12-03 ASSESSMENT — ACTIVITIES OF DAILY LIVING (ADL): BATHING_REQUIRES_ASSISTANCE: 0

## 2024-12-03 ASSESSMENT — FIBROSIS 4 INDEX: FIB4 SCORE: 1.24

## 2024-12-03 NOTE — PROGRESS NOTES
Verbal consent was acquired by the patient to use [employer] ambient listening note generation during this visit       Chief Complaint   Patient presents with    Medicare Annual Wellness       HPI:  Evan Wright is a 69 y.o. here for Medicare Annual Wellness Visit       History of Present Illness     CV:  He reports satisfactory visits with Dr. Cole, with a follow-up scheduled in 2 weeks. His medication was switched from rosuvastatin to atorvastatin due to muscle aches caused by the former. He notes that the atorvastatin has been more effective in lowering his cholesterol levels. He discontinued losartan some time ago and is not currently taking any beta blockers. He monitors his blood pressure at home every other day or every third day, with readings typically around 130/70. He is not taking any iron supplements. He has not received any COVID-19 vaccines since they were discontinued.    He expresses concern about potential kidney issues and diabetes, despite no significant changes in his exercise routine over the past 3 months. He recalls consuming two margaritas and minimal water the day before his blood work. He reports no urinary issues and improved sleep quality, attributing this to relaxation techniques such as breathing exercises and meditation. He mentions that his heart rate drops to the low 30s during sleep, as recorded by his watch, but does not experience this when awake. His average heart rate is in the mid-50s. He reports no visual issues and normal breathing and exercise tolerance. He consumes alcohol equivalent to a glass of wine about five times a week.    He is on omeprazole for his stomach. He had a CT scan a year ago. He had a hernia and blockage. He had acid reflux for many years and inflammation in the esophagus. He experienced choking on various foods for 20 years. He had an endoscopy and colonoscopy.    SOCIAL HISTORY  He drinks alcohol.    IMMUNIZATIONS  He had his influenza  vaccine.       Patient Active Problem List    Diagnosis Date Noted    Agatston coronary artery calcium score greater than 400 01/22/2024    Sensorineural hearing loss, bilateral 10/28/2021    Hearing loss 10/28/2021    Dysfunction of both eustachian tubes 10/28/2021    Bilateral tinnitus 10/28/2021    PVC (premature ventricular contraction) 09/08/2021    NSVT (nonsustained ventricular tachycardia) (HCC) 09/08/2021    Tachycardia 06/13/2021    Dyslipidemia 10/01/2018    Other male erectile dysfunction 10/01/2018    History of squamous cell carcinoma of skin 03/24/2018    Primary insomnia 03/23/2018    Low testosterone level in male 06/13/2017    Essential hypertension 06/13/2017    Tubular adenoma of colon 01/01/2014       Current Outpatient Medications   Medication Sig Dispense Refill    omeprazole (PRILOSEC) 20 MG delayed-release capsule Take 20 mg by mouth 2 times a day.      cloNIDine (CATAPRES) 0.1 MG Tab TAKE 1 TABLET BY MOUTH TWICE DAILY 180 Tablet 3    Testosterone 1.62 % Gel APPLY 1 PUMP TOPICALLY TO THE AFFECTED AREA EVERY DAY AS NEEDED 75 g 0    atorvastatin (LIPITOR) 10 MG Tab Take 1 Tablet by mouth every evening. 30 Tablet 11    amLODIPine (NORVASC) 10 MG Tab TAKE 1 TABLET BY MOUTH EVERY DAY 90 Tablet 3    hydroCHLOROthiazide 25 MG Tab TAKE 1 TABLET BY MOUTH EVERY DAY 90 Tablet 3    clobetasol (TEMOVATE) 0.05 % Cream Apply topically twice a day 45 g 2    fluticasone (FLONASE) 50 MCG/ACT nasal spray Administer 1 Spray into affected nostril(S) every day.      desonide (TRIDESILON) 0.05 % Cream       tadalafil (CIALIS) 20 MG tablet Take 1 Tab by mouth as needed for Erectile Dysfunction (Max 1 dose per 24 hrs). 10 Tab 6    Ascorbic Acid (VITAMIN C PO) Take  by mouth. (Patient not taking: Reported on 12/19/2024)      Cholecalciferol (VITAMIN D) 2000 units Cap Take 1 Capsule by mouth every day. (Patient not taking: Reported on 12/19/2024)      B Complex Vitamins (B COMPLEX 1 PO) Take 1 Tab by mouth every day.  (Patient not taking: Reported on 12/19/2024)       No current facility-administered medications for this visit.          Current supplements as per medication list.     Allergies: Patient has no known allergies.    Current social contact/activities: friends/family     He  reports that he has never smoked. He has never used smokeless tobacco. He reports current alcohol use. He reports that he does not use drugs.  Counseling given: Not Answered      ROS:          Gait: Uses no assistive device  Ostomy: No  Other tubes: No  Amputations: No  Chronic oxygen use: No  Last eye exam: Up-to-date  Wears hearing aids: No   : Denies any unmanageable urinary leakage during the last 6 months       Screening:     Depression Screening  Little interest or pleasure in doing things?  0 - not at all  Feeling down, depressed , or hopeless? 0 - not at all  Patient Health Questionnaire Score: 0     If depressive symptoms identified deferred to follow up visit unless specifically addressed in assessment and plan.    Interpretation of PHQ-9 Total Score   Score Severity   1-4 No Depression   5-9 Mild Depression   10-14 Moderate Depression   15-19 Moderately Severe Depression   20-27 Severe Depression    Screening for Cognitive Impairment  Do you or any of your friends or family members have any concern about your memory? No  Three Minute Recall (Leader, Season, Table) 3/3    Larry clock face with all 12 numbers and set the hands to show 10 minutes after 11.  Yes    Cognitive concerns identified deferred for follow up unless specifically addressed in assessment and plan.    Fall Risk Assessment  Has the patient had two or more falls in the last year or any fall with injury in the last year?  No    Safety Assessment  Do you always wear your seatbelt?  Yes  Any changes to home needed to function safely? No  Difficulty hearing.  No  Patient counseled about all safety risks that were identified.    Functional Assessment ADLs  Are there any  barriers preventing you from cooking for yourself or meeting nutritional needs?  No.    Are there any barriers preventing you from driving safely or obtaining transportation?  No.    Are there any barriers preventing you from using a telephone or calling for help?  No    Are there any barriers preventing you from shopping?  No.    Are there any barriers preventing you from taking care of your own finances?  No    Are there any barriers preventing you from managing your medications?  No    Are there any barriers preventing you from showering, bathing or dressing yourself? No    Are there any barriers preventing you from doing housework or laundry? No  Are there any barriers preventing you from using the toilet?No  Are you currently engaging in any exercise or physical activity?  Yes.      Self-Assessment of Health  What is your perception of your health? Excellent    Do you sleep more than six hours a night? Yes    In the past 7 days, how much did pain keep you from doing your normal work? None    Do you spend quality time with family or friends (virtually or in person)? Yes    Do you usually eat a heart healthy diet that constists of a variety of fruits, vegetables, whole grains and fiber? Yes    Do you eat foods high in fat and/or Fast Food more than three times per week? No    How concerned are you that your medical conditions are not being well managed? Not at all    Are you worried that in the next 2 months, you may not have stable housing that you own, rent, or stay in as part of a household? No      Advance Care Planning  Do you have an Advance Directive, Living Will, Durable Power of , or POLST? No                 Health Maintenance Summary            Overdue - IMM DTaP/Tdap/Td Vaccine (2 - Td or Tdap) Overdue since 7/23/2024 07/23/2014  Imm Admin: Tdap Vaccine              Postponed - COVID-19 Vaccine (4 - 2024-25 season) Postponed until 12/3/2025      11/02/2021  Imm Admin: MODERNA SARS-COV-2  VACCINE (12+)    03/27/2021  Imm Admin: MODERNA SARS-COV-2 VACCINE (12+)    02/27/2021  Imm Admin: MODERNA SARS-COV-2 VACCINE (12+)              Annual Wellness Visit (Yearly) Next due on 12/3/2025      12/03/2024  Level of Service: [place] ANNUAL WELLNESS VISIT-INCLUDES PPPS SUBSEQUE*    12/03/2024  Visit Dx: Medicare annual wellness visit, subsequent    11/29/2023  Level of Service: ANNUAL WELLNESS VISIT-INCLUDES PPPS SUBSEQUE*    11/29/2023  Visit Dx: Medicare annual wellness visit, subsequent    11/14/2022  Visit Dx: Medicare annual wellness visit, subsequent    Only the first 5 history entries have been loaded, but more history exists.              Colorectal Cancer Screening (Colonoscopy - Every 5 Years) Next due on 7/22/2029 07/22/2024  COLONOSCOPY RESULTS    12/18/2019  REFERRAL TO GI FOR COLONOSCOPY    09/26/2014  REFERRAL TO GI FOR COLONOSCOPY    01/01/2014  Colonoscopy (Done)              Pneumococcal Vaccine: 65+ Years (Series Information) Completed      08/07/2020  Imm Admin: Pneumococcal polysaccharide vaccine (PPSV-23)    04/23/2019  Imm Admin: Pneumococcal Conjugate Vaccine (Prevnar/PCV-13)              Zoster (Shingles) Vaccines (Series Information) Completed      04/07/2023  Imm Admin: Zoster Vaccine Recombinant (RZV) (SHINGRIX)    01/23/2023  Imm Admin: Zoster Vaccine Recombinant (RZV) (SHINGRIX)    09/01/2017  Imm Admin: Zoster Vaccine Live (ZVL) (Zostavax) - HISTORICAL DATA              Influenza Vaccine (Series Information) Completed      10/02/2024  Imm Admin: Influenza high-dose trivalent (PF)    09/20/2023  Imm Admin: Influenza Vaccine Adult HD    11/14/2022  Imm Admin: Influenza Vaccine Adult HD    09/23/2021  Imm Admin: Influenza Vaccine Adult HD    09/22/2020  Imm Admin: Influenza Vaccine Adult HD    Only the first 5 history entries have been loaded, but more history exists.              Hepatitis A Vaccine (Hep A) (Series Information) Aged Out      No completion history exists for this  "topic.              Hepatitis B Vaccine (Hep B) (Series Information) Aged Out      No completion history exists for this topic.              HPV Vaccines (Series Information) Aged Out      No completion history exists for this topic.              Polio Vaccine (Inactivated Polio) (Series Information) Aged Out      No completion history exists for this topic.              Meningococcal Immunization (Series Information) Aged Out      No completion history exists for this topic.              Discontinued - Hepatitis C Screening  Discontinued      No completion history exists for this topic.                    Patient Care Team:  Nimisha Rodriguez M.D. as PCP - General (Family Medicine)  Nicole Phan R.N. as Registered Nurse  Edie Price R.N.        Social History     Tobacco Use    Smoking status: Never    Smokeless tobacco: Never   Vaping Use    Vaping status: Never Used   Substance Use Topics    Alcohol use: Yes     Comment: wine daily 1-2/nt    Drug use: No     Family History   Problem Relation Age of Onset    Hypertension Mother     Cancer Father         liver CA    Alcohol/Drug Father     Hypertension Father     No Known Problems Son     No Known Problems Daughter     Heart Disease Neg Hx      He  has a past medical history of CAD (coronary artery disease), Hyperlipidemia, Hypertension, Insomnia, and Testosterone insufficiency.   Past Surgical History:   Procedure Laterality Date    FOOT SURGERY Left 2010    left foot neuroma       Exam:   /70   Pulse 67   Temp 36.6 °C (97.8 °F)   Resp 14   Ht 1.854 m (6' 0.99\")   Wt 87.1 kg (192 lb)   SpO2 98%  Body mass index is 25.34 kg/m².         Physical Exam  Constitutional:       General: he is not in acute distress.     Appearance: Normal appearance.   HENT:      Head: Normocephalic and atraumatic.      Nose: Nose normal.      Mouth/Throat:      Mouth: Mucous membranes are moist.   Eyes:      Conjunctiva/sclera: Conjunctivae normal. "   Cardiovascular:      Rate and Rhythm: Normal rate and regular rhythm.   Pulmonary:      Effort: Pulmonary effort is normal.      Breath sounds: Normal breath sounds. No wheezing.   Abdominal:      General: Abdomen is flat.      Palpations: Abdomen is soft.   Musculoskeletal:      Cervical back: No rigidity.      Right lower leg: No edema.      Left lower leg: No edema.   Lymphadenopathy:      Cervical: No cervical adenopathy.   Skin:     Coloration: Skin is not jaundiced.      Findings: No erythema.   Neurological:      General: No focal deficit present.      Mental Status: he is alert and oriented to person, place, and time. Mental status is at baseline.   Psychiatric:         Mood and Affect: Mood normal.         Behavior: Behavior normal.         Thought Content: Thought content normal.         Judgment: Judgment normal.        Hospital Outpatient Visit on 12/03/2024   Component Date Value Ref Range Status    Sodium 12/03/2024 142  135 - 145 mmol/L Final    Potassium 12/03/2024 3.7  3.6 - 5.5 mmol/L Final    Chloride 12/03/2024 103  96 - 112 mmol/L Final    Co2 12/03/2024 26  20 - 33 mmol/L Final    Anion Gap 12/03/2024 13.0  7.0 - 16.0 Final    Glucose 12/03/2024 66  65 - 99 mg/dL Final    Bun 12/03/2024 25 (H)  8 - 22 mg/dL Final    Creatinine 12/03/2024 1.34  0.50 - 1.40 mg/dL Final    Calcium 12/03/2024 9.8  8.5 - 10.5 mg/dL Final    Correct Calcium 12/03/2024 9.2  8.5 - 10.5 mg/dL Final    AST(SGOT) 12/03/2024 24  12 - 45 U/L Final    ALT(SGPT) 12/03/2024 36  2 - 50 U/L Final    Alkaline Phosphatase 12/03/2024 62  30 - 99 U/L Final    Total Bilirubin 12/03/2024 0.7  0.1 - 1.5 mg/dL Final    Albumin 12/03/2024 4.8  3.2 - 4.9 g/dL Final    Total Protein 12/03/2024 6.1  6.0 - 8.2 g/dL Final    Globulin 12/03/2024 1.3 (L)  1.9 - 3.5 g/dL Final    A-G Ratio 12/03/2024 3.7  g/dL Final    GFR (CKD-EPI) 12/03/2024 57 (A)  >60 mL/min/1.73 m 2 Final    Comment: Estimated Glomerular Filtration Rate is calculated  using  race neutral CKD-EPI 2021 equation per NKF-ASN recommendations.     Hospital Outpatient Visit on 11/26/2024   Component Date Value Ref Range Status    Testosterone,Total 11/26/2024 509  300 - 720 ng/dL Final    Comment: INTERPRETIVE INFORMATION: Testosterone by Immunoassay  Testosterone immunoassays are both imprecise and inaccurate at low  testosterone concentrations, such as those found in children and  cisgender females. For these individuals, testing by mass  spectrometry is recommended; refer to Testosterone (Adult Females,  Children, or Individuals on Testosterone-Suppressing Hormone  Therapy) ([facility] test code 5705192). Free or bioavailable  testosterone measurements may provide supportive information.  For individuals on testosterone hormone therapy, refer to  cisgender male reference intervals. No reference intervals have  been established for males younger than 14 years or for cisgender  females. For a complete set of all established reference  intervals, refer to [email or web address]/Tests/Pub/[id number].      Sex Hormone Bind Globulin 11/26/2024 47  19 - 76 nmol/L Final    Comment: REFERENCE INTERVAL: Sex Hormone Binding Globulin  Access complete set of age- and/or gender-specific reference  intervals for this test in the [facility] Test Directory  ([facility]).      Free Testosterone 11/26/2024 78  47 - 244 pg/mL Final    Comment: INTERPRETIVE INFORMATION:  Testosterone, Free Calculation  Free testosterone concentration is calculated using total  testosterone (measured by immunoassay) and the binding constant of  testosterone and sex hormone-binding globulin (SHBG). Testosterone  immunoassays are both imprecise and inaccurate at low testosterone  concentrations, such as those found in children and cisgender  females. For these individuals, testing by mass spectrometry is  recommended; refer to Testosterone, Free (Adult Females, Children,  or Individuals on Testosterone-Suppressing Hormone Therapy)  ([employer]  test code [id number]).  For individuals on testosterone hormone therapy, refer to  cisgender male reference intervals. No reference intervals have  been established for males younger than 14 years or for cisgender  females. For a complete set of all established reference  intervals, refer to [employer]/Tests/Pub/[email or web address].      Testosterone % Free 11/26/2024 1.5 (L)  1.6 - 2.9 % Final    Comment: Performed By: [facility]  [address] [place] [id number]  : Hemanth Resendez MD, PhD  CLIA Number: [id number]      Glycohemoglobin 11/26/2024 5.5  4.0 - 5.6 % Final    Comment: Increased risk for diabetes:  5.7 -6.4%  Diabetes:  >6.4%  Glycemic control for adults with diabetes:  <7.0%    The above interpretations are per ADA guidelines.  Diagnosis  of diabetes mellitus on the basis of elevated Hemoglobin A1c  should be confirmed by repeating the Hb A1c test.      Est Avg Glucose 11/26/2024 111  mg/dL Final    Comment: The eAG calculation is based on the A1c-Derived Daily Glucose  (ADAG) study.  See the ADA's website for additional information.      Vitamin B12 -True Cobalamin 11/26/2024 616  211 - 911 pg/mL Final    Sodium 11/26/2024 141  135 - 145 mmol/L Final    Potassium 11/26/2024 3.6  3.6 - 5.5 mmol/L Final    Chloride 11/26/2024 102  96 - 112 mmol/L Final    Co2 11/26/2024 26  20 - 33 mmol/L Final    Anion Gap 11/26/2024 13.0  7.0 - 16.0 Final    Glucose 11/26/2024 87  65 - 99 mg/dL Final    Bun 11/26/2024 27 (H)  8 - 22 mg/dL Final    Creatinine 11/26/2024 1.41 (H)  0.50 - 1.40 mg/dL Final    Calcium 11/26/2024 9.8  8.5 - 10.5 mg/dL Final    Correct Calcium 11/26/2024 9.2  8.5 - 10.5 mg/dL Final    AST(SGOT) 11/26/2024 26  12 - 45 U/L Final    ALT(SGPT) 11/26/2024 29  2 - 50 U/L Final    Alkaline Phosphatase 11/26/2024 68  30 - 99 U/L Final    Total Bilirubin 11/26/2024 0.9  0.1 - 1.5 mg/dL Final    Albumin 11/26/2024 4.7  3.2 - 4.9 g/dL Final    Total Protein 11/26/2024  6.9  6.0 - 8.2 g/dL Final    Globulin 11/26/2024 2.2  1.9 - 3.5 g/dL Final    A-G Ratio 11/26/2024 2.1  g/dL Final    Cholesterol,Tot 11/26/2024 137  100 - 199 mg/dL Final    Triglycerides 11/26/2024 64  0 - 149 mg/dL Final    HDL 11/26/2024 65  >=40 mg/dL Final    LDL 11/26/2024 59  <100 mg/dL Final    TSH 11/26/2024 2.830  0.350 - 5.500 uIU/mL Final    Prostatic Specific Antigen Tot 11/26/2024 3.19  0.00 - 4.00 ng/mL Final    WBC 11/26/2024 6.4  4.8 - 10.8 K/uL Final    RBC 11/26/2024 5.04  4.70 - 6.10 M/uL Final    Hemoglobin 11/26/2024 15.4  14.0 - 18.0 g/dL Final    Hematocrit 11/26/2024 44.1  42.0 - 52.0 % Final    MCV 11/26/2024 87.5  81.4 - 97.8 fL Final    MCH 11/26/2024 30.6  27.0 - 33.0 pg Final    MCHC 11/26/2024 34.9  32.3 - 36.5 g/dL Final    RDW 11/26/2024 39.0  35.9 - 50.0 fL Final    Platelet Count 11/26/2024 273  164 - 446 K/uL Final    MPV 11/26/2024 10.3  9.0 - 12.9 fL Final    25-Hydroxy   Vitamin D 25 11/26/2024 72  30 - 100 ng/mL Final    Comment: Adult Ranges:   <20 ng/mL - Deficiency  20-29 ng/mL - Insufficiency   ng/mL - Sufficiency  Electrochemiluminescence binding assay performed using Roche migdalia e  immunoassay analyzer.  The Elecsys Vitamin D total II assay is intended for  the quantitative determination of total 25 hydroxyvitamin D in human serum  and plasma. This assay is to be used as an aid in the assessment of vitamin  D sufficiency in adults.      GFR (CKD-EPI) 11/26/2024 53 (A)  >60 mL/min/1.73 m 2 Final    Comment: Estimated Glomerular Filtration Rate is calculated using  race neutral CKD-EPI 2021 equation per NKF-ASN recommendations.          Assessment and Plan. The following treatment and monitoring plan is recommended:     1. Medicare annual wellness visit, subsequent    2. Decreased GFR  - Comp Metabolic Panel; Future    3. Essential hypertension    4. Dyslipidemia    Other orders  - omeprazole (PRILOSEC) 20 MG delayed-release capsule; Take 20 mg by mouth 2 times a  day.        Assessment & Plan  Lab discussion:  His testosterone levels are within the normal range at 509. His blood sugar control is excellent, indicating no presence of diabetes or prediabetes. Liver function is normal. Thyroid function is also normal. Prostate levels fluctuate within the normal range. His B12 level is satisfactory. Creatinine levels are slightly elevated, which could be due to dehydration. Fasting glucose levels are excellent. Blood counts are normal. GFR is slightly low, possibly due to dehydration. A blood test was ordered to recheck hydration status.    2. Hyperlipidemia.  He was switched from rosuvastatin to atorvastatin due to muscle aches. The atorvastatin has successfully lowered his cholesterol levels, with his bad cholesterol dropping into the 50s. He is advised to continue atorvastatin.    3. Hypertension.  He is currently on clonidine and amlodipine. Clonidine can cause bradycardia as a side effect. He is advised to discuss with his cardiologist about possibly switching to a beta-blocker to manage his blood pressure and resolve the bradycardia.    4. Gastroesophageal Reflux Disease (GERD).  He is taking omeprazole twice a day, which has significantly improved his symptoms. He is advised to continue the current regimen.    5. Health Maintenance.  A tetanus vaccine was recommended. He is advised to get the Tdap vaccine if he will be around small babies, otherwise, the plain tetanus vaccine is sufficient.                Services suggested: No services needed at this time  Health Care Screening: Age-appropriate preventive services recommended by USPTF and ACIP covered by Medicare were discussed today. Services ordered if indicated and agreed upon by the patient.  Referrals offered: Community-based lifestyle interventions to reduce health risks and promote self-management and wellness, fall prevention, nutrition, physical activity, tobacco-use cessation, weight loss, and mental health  services as per orders if indicated.    Discussion today about general wellness and lifestyle habits:    Prevent falls and reduce trip hazards; Cautioned about securing or removing rugs.  Have a working fire alarm and carbon monoxide detector;   Engage in regular physical activity and social activities     Follow-up: Labs every 6 months and routine annual wellness exam

## 2024-12-09 DIAGNOSIS — R79.89 LOW TESTOSTERONE LEVEL IN MALE: ICD-10-CM

## 2024-12-10 ENCOUNTER — APPOINTMENT (OUTPATIENT)
Dept: URBAN - METROPOLITAN AREA CLINIC 35 | Facility: CLINIC | Age: 69
Setting detail: DERMATOLOGY
End: 2024-12-10

## 2024-12-10 DIAGNOSIS — Z85.828 PERSONAL HISTORY OF OTHER MALIGNANT NEOPLASM OF SKIN: ICD-10-CM

## 2024-12-10 DIAGNOSIS — L81.4 OTHER MELANIN HYPERPIGMENTATION: ICD-10-CM

## 2024-12-10 DIAGNOSIS — Z71.89 OTHER SPECIFIED COUNSELING: ICD-10-CM

## 2024-12-10 DIAGNOSIS — Z87.2 PERSONAL HISTORY OF DISEASES OF THE SKIN AND SUBCUTANEOUS TISSUE: ICD-10-CM

## 2024-12-10 DIAGNOSIS — D485 NEOPLASM OF UNCERTAIN BEHAVIOR OF SKIN: ICD-10-CM | Status: INADEQUATELY CONTROLLED

## 2024-12-10 DIAGNOSIS — D22 MELANOCYTIC NEVI: ICD-10-CM

## 2024-12-10 DIAGNOSIS — D18.0 HEMANGIOMA: ICD-10-CM

## 2024-12-10 DIAGNOSIS — R79.89 LOW TESTOSTERONE LEVEL IN MALE: ICD-10-CM

## 2024-12-10 DIAGNOSIS — L82.1 OTHER SEBORRHEIC KERATOSIS: ICD-10-CM

## 2024-12-10 PROBLEM — D48.5 NEOPLASM OF UNCERTAIN BEHAVIOR OF SKIN: Status: ACTIVE | Noted: 2024-12-10

## 2024-12-10 PROBLEM — D18.01 HEMANGIOMA OF SKIN AND SUBCUTANEOUS TISSUE: Status: ACTIVE | Noted: 2024-12-10

## 2024-12-10 PROBLEM — D22.5 MELANOCYTIC NEVI OF TRUNK: Status: ACTIVE | Noted: 2024-12-10

## 2024-12-10 PROCEDURE — 11102 TANGNTL BX SKIN SINGLE LES: CPT

## 2024-12-10 PROCEDURE — ? BIOPSY BY SHAVE METHOD

## 2024-12-10 PROCEDURE — ? SUNSCREEN RECOMMENDATIONS

## 2024-12-10 PROCEDURE — 99214 OFFICE O/P EST MOD 30 MIN: CPT | Mod: 25

## 2024-12-10 PROCEDURE — ? SURGICAL DECISION MAKING

## 2024-12-10 PROCEDURE — ? SEPARATE AND IDENTIFIABLE DOCUMENTATION

## 2024-12-10 PROCEDURE — ? COUNSELING

## 2024-12-10 PROCEDURE — 11103 TANGNTL BX SKIN EA SEP/ADDL: CPT

## 2024-12-10 RX ORDER — TESTOSTERONE 20.25 MG/1.25G
GEL TOPICAL
Qty: 75 G | Refills: 3 | Status: SHIPPED | OUTPATIENT
Start: 2024-12-10 | End: 2024-12-10

## 2024-12-10 RX ORDER — TESTOSTERONE 20.25 MG/1.25G
GEL TOPICAL
Qty: 75 G | Refills: 0 | Status: SHIPPED | OUTPATIENT
Start: 2024-12-10 | End: 2025-02-08

## 2024-12-10 ASSESSMENT — LOCATION DETAILED DESCRIPTION DERM
LOCATION DETAILED: LEFT PROXIMAL LATERAL PRETIBIAL REGION
LOCATION DETAILED: RIGHT PROXIMAL DORSAL FOREARM
LOCATION DETAILED: RIGHT MEDIAL EYEBROW
LOCATION DETAILED: RIGHT FOREHEAD
LOCATION DETAILED: RIGHT SUPERIOR MEDIAL MIDBACK
LOCATION DETAILED: RIGHT DISTAL CALF
LOCATION DETAILED: LEFT LATERAL INFERIOR CHEST
LOCATION DETAILED: RIGHT DISTAL LATERAL PRETIBIAL REGION
LOCATION DETAILED: RIGHT SUPERIOR MEDIAL UPPER BACK
LOCATION DETAILED: RIGHT SUPERIOR LATERAL UPPER BACK
LOCATION DETAILED: LEFT SUPERIOR UPPER BACK
LOCATION DETAILED: LEFT MEDIAL UPPER BACK
LOCATION DETAILED: LEFT INFERIOR UPPER BACK
LOCATION DETAILED: RIGHT POSTERIOR SHOULDER
LOCATION DETAILED: RIGHT LATERAL PROXIMAL CALF

## 2024-12-10 ASSESSMENT — LOCATION SIMPLE DESCRIPTION DERM
LOCATION SIMPLE: RIGHT SHOULDER
LOCATION SIMPLE: CHEST
LOCATION SIMPLE: LEFT LOWER LEG
LOCATION SIMPLE: RIGHT LOWER LEG
LOCATION SIMPLE: LEFT UPPER BACK
LOCATION SIMPLE: RIGHT EYEBROW
LOCATION SIMPLE: RIGHT FOREARM
LOCATION SIMPLE: RIGHT FOREHEAD
LOCATION SIMPLE: RIGHT UPPER BACK
LOCATION SIMPLE: RIGHT CALF
LOCATION SIMPLE: RIGHT LOWER BACK

## 2024-12-10 ASSESSMENT — LOCATION ZONE DERM
LOCATION ZONE: TRUNK
LOCATION ZONE: ARM
LOCATION ZONE: LEG
LOCATION ZONE: FACE

## 2024-12-10 NOTE — PROCEDURE: SURGICAL DECISION MAKING
Complexity (Necessary For Coding; Major - 90 Day Global With Some Exceptions; Minor - 10 Day Global): minor
Identified Risk Factors Documented?: yes
Date Of Surgery - Today Or Tomorrow?: today
Risk Assessment Explanation (Does Not Render In The Note): Clinical determination of the probability and/or consequences of an event, such as surgery. Clinical assessment of the level of risk is affected by the nature of the event under consideration for the patient. Modifier 57 is used to indicate an Evaluation and Management (E/M) service resulted in the initial decision to perform surgery either the day before a major surgery (90 day global) or the day of a major surgery.
Discussion: We discussed not only the risks of the procedure but also the likely feldman that further treatment will be required to treat lesion. This could involve another visit here to destroy or excise  lesion, a visit to a Mohs or general or plastic surgeon, scarring, limited  activity, cosmetic imperfections.
Mucosal Advancement Flap Text: Given the location of the defect, shape of the defect and the proximity to free margins a mucosal advancement flap was deemed most appropriate. Incisions were made with a 15 blade scalpel in the appropriate fashion along the cutaneous vermilion border and the mucosal lip. The remaining actinically damaged mucosal tissue was excised.  The mucosal advancement flap was then elevated to the gingival sulcus with care taken to preserve the neurovascular structures and advanced into the primary defect. Care was taken to ensure that precise realignment of the vermilion border was achieved.

## 2024-12-10 NOTE — PROCEDURE: BIOPSY BY SHAVE METHOD
Detail Level: Detailed
Depth Of Biopsy: dermis
Was A Bandage Applied: Yes
Size Of Lesion In Cm: 0
Biopsy Type: H and E
Biopsy Method: Dermablade
Anesthesia Type: 1% lidocaine without epinephrine
Anesthesia Volume In Cc: 0.7
Hemostasis: Aluminum Chloride
Wound Care: Petrolatum
Dressing: bandage
Destruction After The Procedure: No
Type Of Destruction Used: Curettage
Curettage Text: The wound bed was treated with curettage after the biopsy was performed.
Electrodesiccation And Curettage Text: The wound bed was treated with electrodesiccation and curettage after the biopsy was
Lab: [id number]
Lab Facility: 
Medical Necessity Information: It is in your best interest to select a reason for this procedure from the list below. All of these items fulfill various CMS LCD requirements except the new and changing color options.
Consent: Verbal consent was obtained and risks were reviewed including but not limited to scarring, infection, bleeding, scabbing, incomplete removal, nerve damage and allergy to anesthesia.
Post-Care Instructions: I reviewed with the patient in detail post-care instructions. Patient is to keep the biopsy site dry overnight, and then apply white petrolatum twice daily until healed. Patient may apply hydrogen peroxide soaks to remove any crusting.
Notification Instructions: Patient will be notified of biopsy results. However, patient instructed to call the office if not contacted within 2 weeks.
Billing Type: Third-Party Bill
Information: Selecting Yes will display possible errors in your note based on the variables you have selected. This validation is only offered as a suggestion for you. PLEASE NOTE THAT THE VALIDATION TEXT WILL BE REMOVED WHEN YOU FINALIZE YOUR NOTE. IF YOU WANT TO FAX A PRELIMINARY NOTE YOU WILL NEED TO TOGGLE THIS TO 'NO' IF YOU DO NOT WANT IT IN YOUR FAXED NOTE.

## 2024-12-16 RX ORDER — CLONIDINE HYDROCHLORIDE 0.1 MG/1
0.1 TABLET ORAL 2 TIMES DAILY
Qty: 180 TABLET | Refills: 3 | Status: SHIPPED | OUTPATIENT
Start: 2024-12-16

## 2024-12-19 ENCOUNTER — OFFICE VISIT (OUTPATIENT)
Dept: CARDIOLOGY | Facility: MEDICAL CENTER | Age: 69
End: 2024-12-19
Attending: INTERNAL MEDICINE
Payer: MEDICARE

## 2024-12-19 VITALS
HEIGHT: 72 IN | WEIGHT: 189 LBS | OXYGEN SATURATION: 97 % | RESPIRATION RATE: 16 BRPM | DIASTOLIC BLOOD PRESSURE: 68 MMHG | HEART RATE: 58 BPM | SYSTOLIC BLOOD PRESSURE: 118 MMHG | BODY MASS INDEX: 25.6 KG/M2

## 2024-12-19 DIAGNOSIS — E78.5 DYSLIPIDEMIA: ICD-10-CM

## 2024-12-19 DIAGNOSIS — R93.1 AGATSTON CORONARY ARTERY CALCIUM SCORE GREATER THAN 400: ICD-10-CM

## 2024-12-19 DIAGNOSIS — I49.3 PVC (PREMATURE VENTRICULAR CONTRACTION): ICD-10-CM

## 2024-12-19 DIAGNOSIS — I10 ESSENTIAL HYPERTENSION: Chronic | ICD-10-CM

## 2024-12-19 PROCEDURE — 3078F DIAST BP <80 MM HG: CPT | Performed by: INTERNAL MEDICINE

## 2024-12-19 PROCEDURE — 99214 OFFICE O/P EST MOD 30 MIN: CPT | Performed by: INTERNAL MEDICINE

## 2024-12-19 PROCEDURE — 99213 OFFICE O/P EST LOW 20 MIN: CPT | Performed by: INTERNAL MEDICINE

## 2024-12-19 PROCEDURE — 3074F SYST BP LT 130 MM HG: CPT | Performed by: INTERNAL MEDICINE

## 2024-12-19 ASSESSMENT — ENCOUNTER SYMPTOMS
CHILLS: 0
FOCAL WEAKNESS: 0
MYALGIAS: 0
PALPITATIONS: 0
BRUISES/BLEEDS EASILY: 0
CLAUDICATION: 0
DIZZINESS: 0
DOUBLE VISION: 0
FEVER: 0
NERVOUS/ANXIOUS: 0
ABDOMINAL PAIN: 0
EYES NEGATIVE: 1
MUSCULOSKELETAL NEGATIVE: 1
BLURRED VISION: 0
VOMITING: 0
SHORTNESS OF BREATH: 0
WEAKNESS: 0
NEUROLOGICAL NEGATIVE: 1
COUGH: 0
NAUSEA: 0
HEADACHES: 0
CARDIOVASCULAR NEGATIVE: 1
RESPIRATORY NEGATIVE: 1
GASTROINTESTINAL NEGATIVE: 1
CONSTITUTIONAL NEGATIVE: 1
WEIGHT LOSS: 0
DEPRESSION: 0
PSYCHIATRIC NEGATIVE: 1

## 2024-12-19 ASSESSMENT — FIBROSIS 4 INDEX: FIB4 SCORE: 1.025641025641025641

## 2024-12-19 NOTE — PROGRESS NOTES
Chief Complaint   Patient presents with    Tachycardia     F/V Dx: Tachycardia  NSVT (nonsustained ventricular tachycardia) (HCC)    Hypertension     F/V Dx: Essential hypertension         Subjective     Evan Wright is a 68 y.o. male who presents today for follow up of positive CT coronary calcium study, hypertension and hyperlipidemia, premature ventricular contractions, medication change evaluation.    Since the patient's last visit on 09/12/24, he has been doing well clinically from cardiac standpoint. He denies fatigue, chest pain, shortness of breath, palpitations, lower extremity edema, dizziness or syncope. On the last visit he was started on atorvastatin  and is tolerating this medication well without side effects. He keeps active exercising with cycling or weight, weight lifting.     Past Medical History:   Diagnosis Date    CAD (coronary artery disease)     Hyperlipidemia     Hypertension     Insomnia     Testosterone insufficiency      Past Surgical History:   Procedure Laterality Date    FOOT SURGERY Left 2010    left foot neuroma     Family History   Problem Relation Age of Onset    Hypertension Mother     Cancer Father         liver CA    Alcohol/Drug Father     Hypertension Father     No Known Problems Son     No Known Problems Daughter     Heart Disease Neg Hx      Social History     Socioeconomic History    Marital status:      Spouse name: Not on file    Number of children: 2    Years of education: Not on file    Highest education level: Not on file   Occupational History    Occupation: Retired   Tobacco Use    Smoking status: Never    Smokeless tobacco: Never   Vaping Use    Vaping status: Never Used   Substance and Sexual Activity    Alcohol use: Yes     Comment: wine daily 1-2/nt    Drug use: No    Sexual activity: Yes     Partners: Female     Birth control/protection: Post-Menopausal   Other Topics Concern    Not on file   Social History Narrative    Retired business owner, 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      2 grown kids     Social Drivers of Health     Financial Resource Strain: Not on file   Food Insecurity: Not on file   Transportation Needs: No Transportation Needs (6/7/2021)    PRAPARE - Transportation     Lack of Transportation (Medical): No     Lack of Transportation (Non-Medical): No   Physical Activity: Sufficiently Active (6/7/2021)    Exercise Vital Sign     Days of Exercise per Week: 7 days     Minutes of Exercise per Session: 40 min   Stress: Not on file   Social Connections: Not on file   Intimate Partner Violence: Not on file   Housing Stability: Not on file     No Known Allergies  (Medications reviewed.)  Outpatient Encounter Medications as of 12/19/2024   Medication Sig Dispense Refill    cloNIDine (CATAPRES) 0.1 MG Tab TAKE 1 TABLET BY MOUTH TWICE DAILY 180 Tablet 3    Testosterone 1.62 % Gel APPLY 1 PUMP TOPICALLY TO THE AFFECTED AREA EVERY DAY AS NEEDED 75 g 0    omeprazole (PRILOSEC) 20 MG delayed-release capsule Take 20 mg by mouth 2 times a day.      atorvastatin (LIPITOR) 10 MG Tab Take 1 Tablet by mouth every evening. 30 Tablet 11    amLODIPine (NORVASC) 10 MG Tab TAKE 1 TABLET BY MOUTH EVERY DAY 90 Tablet 3    hydroCHLOROthiazide 25 MG Tab TAKE 1 TABLET BY MOUTH EVERY DAY 90 Tablet 3    clobetasol (TEMOVATE) 0.05 % Cream Apply topically twice a day 45 g 2    fluticasone (FLONASE) 50 MCG/ACT nasal spray Administer 1 Spray into affected nostril(S) every day.      desonide (TRIDESILON) 0.05 % Cream       tadalafil (CIALIS) 20 MG tablet Take 1 Tab by mouth as needed for Erectile Dysfunction (Max 1 dose per 24 hrs). 10 Tab 6    [DISCONTINUED] ASPIRIN 81 PO Take  by mouth. 1 tab every day (Patient not taking: Reported on 12/19/2024)      Ascorbic Acid (VITAMIN C PO) Take  by mouth. (Patient not taking: Reported on 12/19/2024)      Cholecalciferol (VITAMIN D) 2000 units Cap Take 1 Capsule by mouth every day. (Patient not taking: Reported on 12/19/2024)      B Complex Vitamins (B  COMPLEX 1 PO) Take 1 Tab by mouth every day. (Patient not taking: Reported on 12/19/2024)       No facility-administered encounter medications on file as of 12/19/2024.     Review of Systems   Constitutional: Negative.  Negative for chills, fever, malaise/fatigue and weight loss.   HENT: Negative.  Negative for hearing loss.    Eyes: Negative.  Negative for blurred vision and double vision.   Respiratory: Negative.  Negative for cough and shortness of breath.    Cardiovascular: Negative.  Negative for chest pain, palpitations, claudication and leg swelling.   Gastrointestinal: Negative.  Negative for abdominal pain, nausea and vomiting.   Genitourinary: Negative.  Negative for dysuria and urgency.   Musculoskeletal: Negative.  Negative for joint pain and myalgias.   Skin: Negative.  Negative for itching and rash.   Neurological: Negative.  Negative for dizziness, focal weakness, weakness and headaches.   Endo/Heme/Allergies: Negative.  Does not bruise/bleed easily.   Psychiatric/Behavioral: Negative.  Negative for depression. The patient is not nervous/anxious.               Objective     /68 (BP Location: Left arm, Patient Position: Sitting, BP Cuff Size: Adult)   Pulse (!) 58   Resp 16   Ht 1.829 m (6')   Wt 85.7 kg (189 lb)   SpO2 97%   BMI 25.63 kg/m²     Physical Exam  Constitutional:       Appearance: Normal appearance. He is well-developed and normal weight.   HENT:      Head: Normocephalic and atraumatic.   Neck:      Vascular: No JVD.   Cardiovascular:      Rate and Rhythm: Normal rate and regular rhythm.      Heart sounds: Normal heart sounds.   Pulmonary:      Effort: Pulmonary effort is normal.      Breath sounds: Normal breath sounds.   Abdominal:      General: Bowel sounds are normal.      Palpations: Abdomen is soft.      Comments: No hepatosplenomegaly.   Musculoskeletal:         General: Normal range of motion.   Lymphadenopathy:      Cervical: No cervical adenopathy.   Skin:      General: Skin is warm and dry.   Neurological:      Mental Status: He is alert and oriented to person, place, and time.            CARDIAC STUDIES/PROCEDURES:     BIOTEL CONCLUSIONS (07/26/21-08/24/21)   BioTel study showing predominately sinus rhythm with maximum rate of 133 bpm, minimum rate of 50 bpm, average of 62 bpm.   Atrial fibrillation: None.   Supraventricular tachycardia: None.   Pauses: None.   Heart block: None.   Ventricular tachycardia: 3 episodes with fastest run of 126 bpm of 4 beats and longest run on 18 beats.   1% burden of premature ventricular contractions and 1% burden of premature atrial contractions.   Symptoms associated with sinus tachycardia with premature ventricular contractions.      CT CARDIAC SCORING (12/05/23)  Coronary calcification:  LMA - 198.4  LCX - 0.0  LAD - 1242.2  RCA - 84.4  Total Calcium Score: 1524.9  Percentile: Calcium score is above the 90th percentile for the patient's age and sex.  IMPRESSION:  1.  Changes in the esophagus and adjacent posterior mediastinum could be due to inflammatory disease or as likely underlying mass. Gastroenterology evaluation is recommended.  2.  Calcium Score of 400 or greater:     ECHOCARDIOGRAM CONCLUSIONS (11/24/21)  Prior echo 1/2/13, compared to the report of the prior study, there has   been no significant change.   Normal left ventricular systolic function.  The left ventricular ejection fraction is visually estimated to be 65%.  Mild aortic insufficiency.  Estimated right ventricular systolic pressure is 25 mmHg.     EKG performed on (09/08/21) EKG shows sinus rhythm with left anterior fascicular block.     Laboratory results of (11/26/24) were reviewed. Cholesterol profile of 137/64/65/59 mg/dL noted.   Laboratory results of (08/21/24) Cholesterol profile of 176/45/83/84 mg/dL noted.  Laboratory results of (11/09/23) Cholesterol profile of 173/59/74/87 mg/dL noted.   Laboratory results of (10/25/21) Cholesterol profile of  185/37/85/93 mg/dL noted.  Laboratory results of (09/22/20) Cholesterol profile of 182/44/81/92 mg/dL noted.     MYOCARDIAL PERFUSION STUDY CONCLUSIONS (09/17/21)  NUCLEAR IMAGING INTERPRETATION  Normal exercise myocardial perfusion study.  No evidence of ischemia or infarct.  Diaphragmatic artifact.  Normal LVSF with EF of 73%.  Negative stress ECG for ischemia.  Excellent exercise tolerance. Reggie 17:59. 17.1 METs.   Rare PVC.   ECG INTERPRETATION  Negative stress ECG for ischemia.    Assessment & Plan     1. Agatston coronary artery calcium score greater than 400        2. Essential hypertension        3. Dyslipidemia        4. PVC (premature ventricular contraction)            Medical Decision Making: Today's Assessment/Status/Plan:        Positive CT coronary calcium study:  He is a 68 year old male with positive CT coronary calcium study, hypertension and hyperlipidemia, premature ventricular contractions. He is clinically doing well from coronary standpoint. We will continue with current medical therapy including atorvastatin.    Hypertension: Currently, the average blood pressure is well controlled. We will continue with amlodipine, hydrochlorothiazide, clonidine.  Hyperlipidemia: He is doing well on statin therapy without myalgia symptoms.  Premature ventricular contractions: He is clinically doing well on beta blockade therapy.  Medication intolerance: He is intolerant to losartan due to side effects and beta blockade therapy due to bradycardia and rosuvastatin due to myalgia.     We will follow up the patient in 1 year.    CC Nimisha Morales

## 2025-01-15 RX ORDER — HYDROCHLOROTHIAZIDE 25 MG/1
25 TABLET ORAL DAILY
Qty: 90 TABLET | Refills: 3 | Status: SHIPPED | OUTPATIENT
Start: 2025-01-15

## 2025-01-20 ENCOUNTER — APPOINTMENT (OUTPATIENT)
Dept: URBAN - METROPOLITAN AREA CLINIC 35 | Facility: CLINIC | Age: 70
Setting detail: DERMATOLOGY
End: 2025-01-20

## 2025-01-20 DIAGNOSIS — Z71.89 OTHER SPECIFIED COUNSELING: ICD-10-CM

## 2025-01-20 DIAGNOSIS — L82.0 INFLAMED SEBORRHEIC KERATOSIS: ICD-10-CM

## 2025-01-20 PROBLEM — C44.519 BASAL CELL CARCINOMA OF SKIN OF OTHER PART OF TRUNK: Status: ACTIVE | Noted: 2025-01-20

## 2025-01-20 PROCEDURE — ? SUNSCREEN RECOMMENDATIONS

## 2025-01-20 PROCEDURE — 99212 OFFICE O/P EST SF 10 MIN: CPT | Mod: 25

## 2025-01-20 PROCEDURE — 17262 DSTRJ MAL LES T/A/L 1.1-2.0: CPT

## 2025-01-20 PROCEDURE — 17110 DESTRUCTION B9 LES UP TO 14: CPT | Mod: 59,52

## 2025-01-20 PROCEDURE — ? COUNSELING

## 2025-01-20 PROCEDURE — ? CURETTAGE AND DESTRUCTION

## 2025-01-20 PROCEDURE — ? LIQUID NITROGEN

## 2025-01-20 ASSESSMENT — LOCATION SIMPLE DESCRIPTION DERM: LOCATION SIMPLE: LEFT UPPER BACK

## 2025-01-20 ASSESSMENT — LOCATION DETAILED DESCRIPTION DERM: LOCATION DETAILED: LEFT INFERIOR UPPER BACK

## 2025-01-20 ASSESSMENT — LOCATION ZONE DERM: LOCATION ZONE: TRUNK

## 2025-01-20 NOTE — PROCEDURE: LIQUID NITROGEN
Show Applicator Variable?: Yes
Post-Care Instructions: I reviewed with the patient in detail post-care instructions. Patient is to wear sunprotection, and avoid picking at any of the treated lesions. Pt may apply Vaseline to crusted or scabbing areas.
Medical Necessity Clause: This procedure was medically necessary because the lesions that were treated were:
Medical Necessity Information: It is in your best interest to select a reason for this procedure from the list below. All of these items fulfill various CMS LCD requirements except the new and changing color options.
Spray Paint Technique: No
Consent: The patient's verbal consent was obtained including but not limited to risks of crusting, scabbing, blistering, scarring, darker or lighter pigmentary change, recurrence, incomplete removal and infection.
Number Of Freeze-Thaw Cycles: 2 freeze-thaw cycles
Application Tool (Optional): Cry-AC
Detail Level: Detailed
Spray Paint Text: The liquid nitrogen was applied to the skin utilizing a spray paint frosting technique.

## 2025-01-20 NOTE — PROCEDURE: CURETTAGE AND DESTRUCTION
Detail Level: Detailed
Biopsy Photograph Reviewed: Yes
Number Of Curettages: 3
Size Of Lesion In Cm: 0.5
Size Of Lesion After Curettage: 1.1
Add Intralesional Injection: No
Total Volume (Ccs): 1
Anesthesia Type: 1% lidocaine with epinephrine
Cautery Type: aluminum chloride
What Was Performed First?: Curettage
Final Size Statement: The size of the lesion after curettage was
Additional Information: (Optional): The wound was cleaned, and a bandaid was applied.  The patient received detailed post-op instructions.
Consent was obtained from the patient. The risks, benefits and alternatives to therapy were discussed in detail. Specifically, the risks of infection, scarring, bleeding, prolonged wound healing, nerve injury, incomplete removal, allergy to anesthesia and recurrence were addressed. Alternatives to ED&C, such as: surgical removal and XRT were also discussed.  Prior to the procedure, the treatment site was clearly identified and confirmed by the patient. All components of Universal Protocol/PAUSE Rule completed.
Post-Care Instructions: I reviewed with the patient in detail post-care instructions. Patient is to keep the area dry for 24 hours, and not to engage in any swimming until the area is healed. Should the patient develop any fevers, chills, bleeding, severe pain patient will contact the office immediately.
Bill As A Line Item Or As Units: Line Item

## 2025-02-03 ENCOUNTER — APPOINTMENT (OUTPATIENT)
Dept: URBAN - METROPOLITAN AREA CLINIC 36 | Facility: CLINIC | Age: 70
Setting detail: DERMATOLOGY
End: 2025-02-03

## 2025-02-03 PROBLEM — C44.319 BASAL CELL CARCINOMA OF SKIN OF OTHER PARTS OF FACE: Status: ACTIVE | Noted: 2025-02-03

## 2025-02-03 PROCEDURE — ? PRESCRIPTION

## 2025-02-03 PROCEDURE — 17311 MOHS 1 STAGE H/N/HF/G: CPT

## 2025-02-03 PROCEDURE — ? MOHS SURGERY

## 2025-02-03 PROCEDURE — 13132 CMPLX RPR F/C/C/M/N/AX/G/H/F: CPT

## 2025-02-03 RX ORDER — DOXYCYCLINE 100 MG/1
TABLET, FILM COATED ORAL
Qty: 14 | Refills: 0 | Status: ERX | COMMUNITY
Start: 2025-02-03

## 2025-02-03 RX ADMIN — DOXYCYCLINE: 100 TABLET, FILM COATED ORAL at 00:00

## 2025-02-03 NOTE — PROCEDURE: MOHS SURGERY
Did The Patient Refuse Pain Medications?: No
Mohs Method Verbiage: An incision at a 45 degree angle following the standard Mohs approach was done and the specimen was harvested as a microscopic controlled layer.
Epidermal Sutures: 6-0 Surgipro
Show Patient Name Variable: Yes
Cheek-To-Nose Interpolation Flap Text: A decision was made to reconstruct the defect utilizing an interpolation axial flap and a staged reconstruction.  A telfa template was made of the defect.  This telfa template was then used to outline the Cheek-To-Nose Interpolation flap.  The donor area for the pedicle flap was then injected with anesthesia.  The flap was excised through the skin and subcutaneous tissue down to the layer of the underlying musculature.  The interpolation flap was carefully excised within this deep plane to maintain its blood supply.  The edges of the donor site were undermined.   The donor site was closed in a primary fashion.  The pedicle was then rotated into position and sutured.  Once the tube was sutured into place, adequate blood supply was confirmed with blanching and refill.  The pedicle was then wrapped with xeroform gauze and dressed appropriately with a telfa and gauze bandage to ensure continued blood supply and protect the attached pedicle.
Pain Refusal Text: I offered to prescribe pain medication but the patient refused to take this medication.
Epidermal Autograft Text: The defect edges were debeveled with a #15 scalpel blade.  Given the location of the defect, shape of the defect and the proximity to free margins an epidermal autograft was deemed most appropriate.  Using a sterile surgical marker, the primary defect shape was transferred to the donor site. The epidermal graft was then harvested.  The skin graft was then placed in the primary defect and oriented appropriately.
Estlander Flap (Upper To Lower Lip) Text: The defect of the lower lip was assessed and measured.  Given the location and size of the defect, an Estlander flap was deemed most appropriate.  Using a sterile surgical marker, an appropriate Estlander flap was measured and drawn on the upper lip. Local anesthesia was then infiltrated. A scalpel was then used to incise the lateral aspect of the flap, through skin, muscle and mucosa, leaving the flap pedicled medially.  The flap was then rotated and positioned to fill the lower lip defect.  The flap was then sutured into place with a three layer technique, closing the orbicularis oris muscle layer with subcutaneous buried sutures, followed by a mucosal layer and an epidermal layer.
Consent (Temporal Branch)/Introductory Paragraph: The rationale for Mohs was explained to the patient and consent was obtained. The risks, benefits and alternatives to therapy were discussed in detail. Specifically, the risks of damage to the temporal branch of the facial nerve, infection, scarring, bleeding, prolonged wound healing, incomplete removal, allergy to anesthesia, and recurrence were addressed. Prior to the procedure, the treatment site was clearly identified and confirmed by the patient. All components of Universal Protocol/PAUSE Rule completed.
Stage 5: Additional Anesthesia Type: 1% lidocaine with epinephrine
Transposition Flap Text: The defect edges were debeveled with a #15 scalpel blade.  Given the location of the defect and the proximity to free margins a transposition flap was deemed most appropriate.  Using a sterile surgical marker, an appropriate transposition flap was drawn incorporating the defect.    The area thus outlined was incised deep to adipose tissue with a #15 scalpel blade.  The skin margins were undermined to an appropriate distance in all directions utilizing iris scissors.
Oculoplastic Surgeon Procedure Text (F): After obtaining clear surgical margins the patient was sent to oculoplastics for surgical repair.  The patient understands they will receive post-surgical care and follow-up from the referring physician's office.
Stage 2: Number Of Blocks?: 0
Hemigard Retention Suture: 0-0 Nylon
Manual Repair Warning Statement: We plan on removing the manually selected variable below in favor of our much easier automatic structured text blocks found in the previous tab. We decided to do this to help make the flow better and give you the full power of structured data. Manual selection is never going to be ideal in our platform and I would encourage you to avoid using manual selection from this point on, especially since I will be sunsetting this feature. It is important that you do one of two things with the customized text below. First, you can save all of the text in a word file so you can have it for future reference. Second, transfer the text to the appropriate area in the Library tab. Lastly, if there is a flap or graft type which we do not have you need to let us know right away so I can add it in before the variable is hidden. No need to panic, we plan to give you roughly 6 months to make the change.
Histology Selection Override (Optional- Will Default To Parent Diagnosis If N/A): Infiltrative Basal Cell Carcinoma
Interpolation Flap Text: A decision was made to reconstruct the defect utilizing an interpolation axial flap and a staged reconstruction.  A telfa template was made of the defect.  This telfa template was then used to outline the interpolation flap.  The donor area for the pedicle flap was then injected with anesthesia.  The flap was excised through the skin and subcutaneous tissue down to the layer of the underlying musculature.  The interpolation flap was carefully excised within this deep plane to maintain its blood supply.  The edges of the donor site were undermined.   The donor site was closed in a primary fashion.  The pedicle was then rotated into position and sutured.  Once the tube was sutured into place, adequate blood supply was confirmed with blanching and refill.  The pedicle was then wrapped with xeroform gauze and dressed appropriately with a telfa and gauze bandage to ensure continued blood supply and protect the attached pedicle.
Date Of Previous Biopsy (Optional): 12/10/24
Special Stains Stage 10 - Results: Base On Clearance Noted Above
Helical Rim Advancement Flap Text: The defect edges were debeveled with a #15 blade scalpel.  Given the location of the defect and the proximity to free margins (helical rim) a double helical rim advancement flap was deemed most appropriate.  Using a sterile surgical marker, the appropriate advancement flaps were drawn incorporating the defect and placing the expected incisions between the helical rim and antihelix where possible.  The area thus outlined was incised through and through with a #15 scalpel blade.  With a skin hook and iris scissors, the flaps were gently and sharply undermined and freed up.
Mid-Level Procedure Text (E): After obtaining clear surgical margins the patient was sent to a mid-level provider for surgical repair.  The patient understands they will receive post-surgical care and follow-up from the mid-level provider.
O-Z Flap Text: The defect edges were debeveled with a #15 scalpel blade.  Given the location of the defect, shape of the defect and the proximity to free margins an O-Z flap was deemed most appropriate.  Using a sterile surgical marker, an appropriate transposition flap was drawn incorporating the defect and placing the expected incisions within the relaxed skin tension lines where possible. The area thus outlined was incised deep to adipose tissue with a #15 scalpel blade.  The skin margins were undermined to an appropriate distance in all directions utilizing iris scissors.
Previous Accession (Optional): [id number]
Lazy S Intermediate Repair Preamble Text (Leave Blank If You Do Not Want): Undermining was performed with blunt dissection.
Initial Size Of Lesion: 1
Staged Advancement Flap Text: The defect edges were debeveled with a #15 scalpel blade.  Given the location of the defect, shape of the defect and the proximity to free margins a staged advancement flap was deemed most appropriate.  Using a sterile surgical marker, an appropriate advancement flap was drawn incorporating the defect and placing the expected incisions within the relaxed skin tension lines where possible. The area thus outlined was incised deep to adipose tissue with a #15 scalpel blade.  The skin margins were undermined to an appropriate distance in all directions utilizing iris scissors.
Mohs Rapid Report Verbiage: The area of clinically evident tumor was marked with skin marking ink and appropriately hatched.  The initial incision was made following the Mohs approach through the skin.  The specimen was taken to the lab, divided into the necessary number of pieces, chromacoded and processed according to the Mohs protocol.  This was repeated in successive stages until a tumor free defect was achieved.
O-T Advancement Flap Text: The defect edges were debeveled with a #15 scalpel blade.  Given the location of the defect, shape of the defect and the proximity to free margins an O-T advancement flap was deemed most appropriate.  Using a sterile surgical marker, an appropriate advancement flap was drawn incorporating the defect and placing the expected incisions within the relaxed skin tension lines where possible.    The area thus outlined was incised deep to adipose tissue with a #15 scalpel blade.  The skin margins were undermined to an appropriate distance in all directions utilizing iris scissors.
Mauc Instructions: By selecting yes to the question below the MAUC number will be added into the note.  This will be calculated automatically based on the diagnosis chosen, the size entered, the body zone selected (H,M,L) and the specific indications you chose. You will also have the option to override the Mohs AUC if you disagree with the automatically calculated number and this option is found in the Case Summary tab.
Muscle Hinge Flap Text: The defect edges were debeveled with a #15 scalpel blade.  Given the size, depth and location of the defect and the proximity to free margins a muscle hinge flap was deemed most appropriate.  Using a sterile surgical marker, an appropriate hinge flap was drawn incorporating the defect. The area thus outlined was incised with a #15 scalpel blade.  The skin margins were undermined to an appropriate distance in all directions utilizing iris scissors.
X Size Of Lesion In Cm (Optional): 0.8
Intermediate Repair And Graft Additional Text (Will Appearing After The Standard Complex Repair Text): The intermediate repair was not sufficient to completely close the primary defect. The remaining additional defect was repaired with the graft mentioned below.
Graft Donor Site Bandage (Optional-Leave Blank If You Don't Want In Note): Aquaplast was fitted to the graft site and sewn into place. A pressure bandage were applied to the donor site and over the aquaplast bolster.
Split-Thickness Skin Graft Text: The defect edges were debeveled with a #15 scalpel blade.  Given the location of the defect, shape of the defect and the proximity to free margins a split thickness skin graft was deemed most appropriate.  Using a sterile surgical marker, the primary defect shape was transferred to the donor site. The split thickness graft was then harvested.  The skin graft was then placed in the primary defect and oriented appropriately.
Advancement Flap (Single) Text: The defect edges were debeveled with a #15 scalpel blade.  Given the location of the defect and the proximity to free margins a single advancement flap was deemed most appropriate.  Using a sterile surgical marker, an appropriate advancement flap was drawn incorporating the defect and placing the expected incisions within the relaxed skin tension lines where possible.    The area thus outlined was incised deep to adipose tissue with a #15 scalpel blade.  The skin margins were undermined to an appropriate distance in all directions utilizing iris scissors.
Asc Procedure Text (F): After obtaining clear surgical margins the patient was sent to an ASC for surgical repair.  The patient understands they will receive post-surgical care and follow-up from the ASC physician.
Anesthesia Volume In Cc: 6
Primary Defect Length In Cm (Final Defect Size - Required For Flaps/Grafts): 1.3
Was The Patient On Physician Recommended Anticoagulation Therapy?: Please Select the Appropriate Response
Bcc Histology Text: There were numerous aggregates of basaloid cells.
Suturegard Intro: Intraoperative tissue expansion was performed, utilizing the SUTUREGARD device, in order to reduce wound tension.
Melolabial Interpolation Flap Text: A decision was made to reconstruct the defect utilizing an interpolation axial flap and a staged reconstruction.  A telfa template was made of the defect.  This telfa template was then used to outline the melolabial interpolation flap.  The donor area for the pedicle flap was then injected with anesthesia.  The flap was excised through the skin and subcutaneous tissue down to the layer of the underlying musculature.  The pedicle flap was carefully excised within this deep plane to maintain its blood supply.  The edges of the donor site were undermined.   The donor site was closed in a primary fashion.  The pedicle was then rotated into position and sutured.  Once the tube was sutured into place, adequate blood supply was confirmed with blanching and refill.  The pedicle was then wrapped with xeroform gauze and dressed appropriately with a telfa and gauze bandage to ensure continued blood supply and protect the attached pedicle.
Burow's Graft Text: The defect edges were debeveled with a #15 scalpel blade.  Given the location of the defect, shape of the defect, the proximity to free margins and the presence of a standing cone deformity a Burow's skin graft was deemed most appropriate. The standing cone was removed and this tissue was then trimmed to the shape of the primary defect. The adipose tissue was also removed until only dermis and epidermis were left.  The skin margins of the secondary defect were undermined to an appropriate distance in all directions utilizing iris scissors.  The secondary defect was closed with interrupted buried subcutaneous sutures.  The skin edges were then re-apposed with running  sutures.  The skin graft was then placed in the primary defect and oriented appropriately.
Graft Donor Site Epidermal Sutures (Optional): 5-0 Surgipro
Graft Donor Site Dermal Sutures (Optional): 5-0 Polysorb
Otolaryngologist Procedure Text (E): After obtaining clear surgical margins the patient was sent to otolaryngology for surgical repair.  The patient understands they will receive post-surgical care and follow-up from the referring physician's office.
Bilateral Helical Rim Advancement Flap Text: The defect edges were debeveled with a #15 blade scalpel.  Given the location of the defect and the proximity to free margins (helical rim) a bilateral helical rim advancement flap was deemed most appropriate.  Using a sterile surgical marker, the appropriate advancement flaps were drawn incorporating the defect and placing the expected incisions between the helical rim and antihelix where possible.  The area thus outlined was incised through and through with a #15 scalpel blade.  With a skin hook and iris scissors, the flaps were gently and sharply undermined and freed up.
Crescentic Complex Repair Preamble Text (Leave Blank If You Do Not Want): Extensive wide undermining was performed.
Rotation Flap Text: The defect edges were debeveled with a #15 scalpel blade.  Given the location of the defect, shape of the defect and the proximity to free margins a rotation flap was deemed most appropriate.  Using a sterile surgical marker, an appropriate rotation flap was drawn incorporating the defect and placing the expected incisions within the relaxed skin tension lines where possible.    The area thus outlined was incised deep to adipose tissue with a #15 scalpel blade.  The skin margins were undermined to an appropriate distance in all directions utilizing iris scissors.
Referring Physician (Optional): smith
Localized Dermabrasion With Wire Brush Text: The patient was draped in routine manner.  Localized dermabrasion using 3 x 17 mm wire brush was performed in routine manner to papillary dermis. This spot dermabrasion is being performed to complete skin cancer reconstruction. It also will eliminate the other sun damaged precancerous cells that are known to be part of the regional effect of a lifetime's worth of sun exposure. This localized dermabrasion is therapeutic and should not be considered cosmetic in any regard.
Abbe Flap (Upper To Lower Lip) Text: The defect of the lower lip was assessed and measured.  Given the location and size of the defect, an Abbe flap was deemed most appropriate.  Using a sterile surgical marker, an appropriate Abbe flap was measured and drawn on the upper lip. Local anesthesia was then infiltrated.  A scalpel was then used to incise the upper lip through and through the skin, vermilion, muscle and mucosa, leaving the flap pedicled on the opposite side.  The flap was then rotated and transferred to the lower lip defect.  The flap was then sutured into place with a three layer technique, closing the orbicularis oris muscle layer with subcutaneous buried sutures, followed by a mucosal layer and an epidermal layer.
Keystone Flap Text: The defect edges were debeveled with a #15 scalpel blade.  Given the location of the defect, shape of the defect a keystone flap was deemed most appropriate.  Using a sterile surgical marker, an appropriate keystone flap was drawn incorporating the defect, outlining the appropriate donor tissue and placing the expected incisions within the relaxed skin tension lines where possible. The area thus outlined was incised deep to adipose tissue with a #15 scalpel blade.  The skin margins were undermined to an appropriate distance in all directions around the primary defect and laterally outward around the flap utilizing iris scissors.
Dressing (No Sutures): pressure dressing with telfa
Purse String (Intermediate) Text: Given the location of the defect and the characteristics of the surrounding skin a purse string intermediate closure was deemed most appropriate.  Undermining was performed circumfirentially around the surgical defect.  A purse string suture was then placed and tightened.
Suturegard Body: The suture ends were repeatedly re-tightened and re-clamped to achieve the desired tissue expansion.
Crescentic Advancement Flap Text: The defect edges were debeveled with a #15 scalpel blade.  Given the location of the defect and the proximity to free margins a crescentic advancement flap was deemed most appropriate.  Using a sterile surgical marker, the appropriate advancement flap was drawn incorporating the defect and placing the expected incisions within the relaxed skin tension lines where possible.    The area thus outlined was incised deep to adipose tissue with a #15 scalpel blade.  The skin margins were undermined to an appropriate distance in all directions utilizing iris scissors.
Eyelid Full Thickness Repair - 52779: The eyelid defect was full thickness which required a wedge repair of the eyelid. Special care was taken to ensure that the eyelid margin was realligned when placing sutures.
Brow Lift Text: A midfrontal incision was made medially to the defect to allow access to the tissues just superior to the left eyebrow. Following careful dissection inferiorly in a supraperiosteal plane to the level of the left eyebrow, several 3-0 monocryl sutures were used to resuspend the eyebrow orbicularis oculi muscular unit to the superior frontal bone periosteum. This resulted in an appropriate reapproximation of static eyebrow symmetry and correction of the left brow ptosis.
Flip-Flop Flap Text: The defect edges were debeveled with a #15 blade scalpel.  Given the location of the defect and the proximity to free margins a flip-flop flap was deemed most appropriate. Using a sterile surgical marker, the appropriate flap was drawn incorporating the defect and placing the expected incisions between the helical rim and antihelix where possible.  The area thus outlined was incised through and through with a #15 scalpel blade. Following this, the designed flap was carried over into the primary defect and sutured into place.
Provider Procedure Text (B): After obtaining clear surgical margins the defect was repaired by another provider.
Consent 3/Introductory Paragraph: I gave the patient a chance to ask questions they had about the procedure.  Following this I explained the Mohs procedure and consent was obtained. The risks, benefits and alternatives to therapy were discussed in detail. Specifically, the risks of infection, scarring, bleeding, prolonged wound healing, incomplete removal, allergy to anesthesia, nerve injury and recurrence were addressed. Prior to the procedure, the treatment site was clearly identified and confirmed by the patient. All components of Universal Protocol/PAUSE Rule completed.
Rhomboid Transposition Flap Text: The defect edges were debeveled with a #15 scalpel blade.  Given the location of the defect and the proximity to free margins a rhomboid transposition flap was deemed most appropriate.  Using a sterile surgical marker, an appropriate rhomboid flap was drawn incorporating the defect.    The area thus outlined was incised deep to adipose tissue with a #15 scalpel blade.  The skin margins were undermined to an appropriate distance in all directions utilizing iris scissors.
Medical Necessity Statement: Based on my medical judgement, Mohs surgery is the most appropriate treatment for this cancer compared to other treatments.
Home Suture Removal Text: Patient was provided instructions on removing sutures and will remove their sutures at home.  If they have any questions or difficulties they will call the office.
Non-Graft Cartilage Fenestration Text: The cartilage was fenestrated with a 2mm punch biopsy to help facilitate healing.
Depth Of Tumor Invasion (For Histology): dermis
Adjacent Tissue Transfer Text: The defect edges were debeveled with a #15 scalpel blade.  Given the location of the defect and the proximity to free margins an adjacent tissue transfer was deemed most appropriate.  Using a sterile surgical marker, an appropriate flap was drawn incorporating the defect and placing the expected incisions within the relaxed skin tension lines where possible.    The area thus outlined was incised deep to adipose tissue with a #15 scalpel blade.  The skin margins were undermined to an appropriate distance in all directions utilizing iris scissors.
Composite Graft Text: The defect edges were debeveled with a #15 scalpel blade.  Given the location of the defect, shape of the defect, the proximity to free margins and the fact the defect was full thickness a composite graft was deemed most appropriate.  The defect was outline and then transferred to the donor site.  A full thickness graft was then excised from the donor site. The graft was then placed in the primary defect, oriented appropriately and then sutured into place.  The secondary defect was then repaired using a primary closure.
Consent (Scalp)/Introductory Paragraph: The rationale for Mohs was explained to the patient and consent was obtained. The risks, benefits and alternatives to therapy were discussed in detail. Specifically, the risks of changes in hair growth pattern secondary to repair, infection, scarring, bleeding, prolonged wound healing, incomplete removal, allergy to anesthesia, nerve injury and recurrence were addressed. Prior to the procedure, the treatment site was clearly identified and confirmed by the patient. All components of Universal Protocol/PAUSE Rule completed.
Repair Type: Complex Repair
Inflammation Suggestive Of Cancer Camouflage Histology Text: There was a dense lymphocytic infiltrate which prevented adequate histologic evaluation of adjacent structures.
Area H Indication Text: Tumors in this location are included in Area H (eyelids, eyebrows, nose, lips, chin, ear, pre-auricular, post-auricular, temple, genitalia, hands, feet, ankles and areola).  Tissue conservation is critical in these anatomic locations.
Which Instrument Did You Use For Dermabrasion?: Wire Brush
Dermal Autograft Text: The defect edges were debeveled with a #15 scalpel blade.  Given the location of the defect, shape of the defect and the proximity to free margins a dermal autograft was deemed most appropriate.  Using a sterile surgical marker, the primary defect shape was transferred to the donor site. The area thus outlined was incised deep to adipose tissue with a #15 scalpel blade.  The harvested graft was then trimmed of adipose and epidermal tissue until only dermis was left.  The skin graft was then placed in the primary defect and oriented appropriately.
Plastic Surgeon Procedure Text (D): After obtaining clear surgical margins the patient was sent to plastics for surgical repair.  The patient understands they will receive post-surgical care and follow-up from the referring physician's office.
Location Indication Override (Is Already Calculated Based On Selected Body Location): Area H
Cheek Interpolation Flap Text: A decision was made to reconstruct the defect utilizing an interpolation axial flap and a staged reconstruction.  A telfa template was made of the defect.  This telfa template was then used to outline the Cheek Interpolation flap.  The donor area for the pedicle flap was then injected with anesthesia.  The flap was excised through the skin and subcutaneous tissue down to the layer of the underlying musculature.  The interpolation flap was carefully excised within this deep plane to maintain its blood supply.  The edges of the donor site were undermined.   The donor site was closed in a primary fashion.  The pedicle was then rotated into position and sutured.  Once the tube was sutured into place, adequate blood supply was confirmed with blanching and refill.  The pedicle was then wrapped with xeroform gauze and dressed appropriately with a telfa and gauze bandage to ensure continued blood supply and protect the attached pedicle.
Surgeon/Pathologist Verbiage (Will Incorporate Name Of Surgeon From Intro If Not Blank): operated in two distinct and integrated capacities as the surgeon and pathologist.
Epidermal Closure Graft Donor Site (Optional): running
Same Histology In Subsequent Stages Text: The pattern and morphology of the tumor is as described in the first stage.
Presence Of Scar Tissue (For Histology): absent
Wound Care (No Sutures): Petrolatum
Graft Cartilage Fenestration Text: The cartilage was fenestrated with a 2mm punch biopsy to help facilitate graft survival and healing.
Mustarde Flap Text: The defect edges were debeveled with a #15 scalpel blade.  Given the size, depth and location of the defect and the proximity to free margins a Mustarde flap was deemed most appropriate.  Using a sterile surgical marker, an appropriate flap was drawn incorporating the defect. The area thus outlined was incised with a #15 scalpel blade.  The skin margins were undermined to an appropriate distance in all directions utilizing iris scissors.
Cheiloplasty (Complex) Text: A decision was made to reconstruct the defect with a  cheiloplasty.  The defect was undermined extensively.  Additional obicularis oris muscle was excised with a 15 blade scalpel.  The defect was converted into a full thickness wedge to facilite a better cosmetic result.  Small vessels were then tied off with 5-0 monocyrl. The obicularis oris, superficial fascia, adipose and dermis were then reapproximated.  After the deeper layers were approximated the epidermis was reapproximated with particular care given to realign the vermilion border.
Suture Removal: 9 days
Skin Substitute Text: The defect edges were debeveled with a #15 scalpel blade.  Given the location of the defect, shape of the defect and the proximity to free margins a skin substitute graft was deemed most appropriate.  The graft material was trimmed to fit the size of the defect. The graft was then placed in the primary defect and oriented appropriately.
Closure 4 Information: This tab is for additional flaps and grafts above and beyond our usual structured repairs.  Please note if you enter information here it will not currently bill and you will need to add the billing information manually.
Cheiloplasty (Less Than 50%) Text: A decision was made to reconstruct the defect with a  cheiloplasty.  The defect was undermined extensively.  Additional obicularis oris muscle was excised with a 15 blade scalpel.  The defect was converted into a full thickness wedge, of less than 50% of the vertical height of the lip, to facilite a better cosmetic result.  Small vessels were then tied off with 5-0 monocyrl. The obicularis oris, superficial fascia, adipose and dermis were then reapproximated.  After the deeper layers were approximated the epidermis was reapproximated with particular care given to realign the vermilion border.
Which Eyelid Repair Cpt Are You Using?: 99285
Purse String (Simple) Text: Given the location of the defect and the characteristics of the surrounding skin a purse string closure was deemed most appropriate.  Undermining was performed circumfirentially around the surgical defect.  A purse string suture was then placed and tightened.
Peng Advancement Flap Text: The defect edges were debeveled with a #15 scalpel blade.  Given the location of the defect, shape of the defect and the proximity to free margins a Peng advancement flap was deemed most appropriate.  Using a sterile surgical marker, an appropriate advancement flap was drawn incorporating the defect and placing the expected incisions within the relaxed skin tension lines where possible. The area thus outlined was incised deep to adipose tissue with a #15 scalpel blade.  The skin margins were undermined to an appropriate distance in all directions utilizing iris scissors.
Localized Dermabrasion With 15 Blade Text: The patient was draped in routine manner.  Localized dermabrasion using a 15 blade was performed in routine manner to papillary dermis. This spot dermabrasion is being performed to complete skin cancer reconstruction. It also will eliminate the other sun damaged precancerous cells that are known to be part of the regional effect of a lifetime's worth of sun exposure. This localized dermabrasion is therapeutic and should not be considered cosmetic in any regard.
Island Pedicle Flap Text: The defect edges were debeveled with a #15 scalpel blade.  Given the location of the defect, shape of the defect and the proximity to free margins an island pedicle advancement flap was deemed most appropriate.  Using a sterile surgical marker, an appropriate advancement flap was drawn incorporating the defect, outlining the appropriate donor tissue and placing the expected incisions within the relaxed skin tension lines where possible.    The area thus outlined was incised deep to adipose tissue with a #15 scalpel blade.  The skin margins were undermined to an appropriate distance in all directions around the primary defect and laterally outward around the island pedicle utilizing iris scissors.  There was minimal undermining beneath the pedicle flap.
Retention Suture Text: Retention sutures were placed to support the closure and prevent dehiscence.
Consent (Near Eyelid Margin)/Introductory Paragraph: The rationale for Mohs was explained to the patient and consent was obtained. The risks, benefits and alternatives to therapy were discussed in detail. Specifically, the risks of ectropion or eyelid deformity, infection, scarring, bleeding, prolonged wound healing, incomplete removal, allergy to anesthesia, nerve injury and recurrence were addressed. Prior to the procedure, the treatment site was clearly identified and confirmed by the patient. All components of Universal Protocol/PAUSE Rule completed.
Information: Selecting Yes will display possible errors in your note based on the variables you have selected. This validation is only offered as a suggestion for you. PLEASE NOTE THAT THE VALIDATION TEXT WILL BE REMOVED WHEN YOU FINALIZE YOUR NOTE. IF YOU WANT TO FAX A PRELIMINARY NOTE YOU WILL NEED TO TOGGLE THIS TO 'NO' IF YOU DO NOT WANT IT IN YOUR FAXED NOTE.
Posterior Auricular Interpolation Flap Text: A decision was made to reconstruct the defect utilizing an interpolation axial flap and a staged reconstruction.  A telfa template was made of the defect.  This telfa template was then used to outline the posterior auricular interpolation flap.  The donor area for the pedicle flap was then injected with anesthesia.  The flap was excised through the skin and subcutaneous tissue down to the layer of the underlying musculature.  The pedicle flap was carefully excised within this deep plane to maintain its blood supply.  The edges of the donor site were undermined.   The donor site was closed in a primary fashion.  The pedicle was then rotated into position and sutured.  Once the tube was sutured into place, adequate blood supply was confirmed with blanching and refill.  The pedicle was then wrapped with xeroform gauze and dressed appropriately with a telfa and gauze bandage to ensure continued blood supply and protect the attached pedicle.
Island Pedicle Flap With Canthal Suspension Text: The defect edges were debeveled with a #15 scalpel blade.  Given the location of the defect, shape of the defect and the proximity to free margins an island pedicle advancement flap was deemed most appropriate.  Using a sterile surgical marker, an appropriate advancement flap was drawn incorporating the defect, outlining the appropriate donor tissue and placing the expected incisions within the relaxed skin tension lines where possible. The area thus outlined was incised deep to adipose tissue with a #15 scalpel blade.  The skin margins were undermined to an appropriate distance in all directions around the primary defect and laterally outward around the island pedicle utilizing iris scissors.  There was minimal undermining beneath the pedicle flap. A suspension suture was placed in the canthal tendon to prevent tension and prevent ectropion.
Partial Purse String (Intermediate) Text: Given the location of the defect and the characteristics of the surrounding skin an intermediate purse string closure was deemed most appropriate.  Undermining was performed circumfirentially around the surgical defect.  A purse string suture was then placed and tightened. Wound tension only allowed a partial closure of the circular defect.
Abbe Flap (Lower To Upper Lip) Text: The defect of the upper lip was assessed and measured.  Given the location and size of the defect, an Abbe flap was deemed most appropriate.  Using a sterile surgical marker, an appropriate Abbe flap was measured and drawn on the lower lip. Local anesthesia was then infiltrated. A scalpel was then used to incise the upper lip through and through the skin, vermilion, muscle and mucosa, leaving the flap pedicled on the opposite side.  The flap was then rotated and transferred to the lower lip defect.  The flap was then sutured into place with a three layer technique, closing the orbicularis oris muscle layer with subcutaneous buried sutures, followed by a mucosal layer and an epidermal layer.
Nasolabial Transposition Flap Text: The defect edges were debeveled with a #15 scalpel blade.  Given the size, depth and location of the defect and the proximity to free margins a nasolabial transposition flap was deemed most appropriate. Using a sterile surgical marker, an appropriate flap was drawn incorporating the defect. The area thus outlined was incised with a #15 scalpel blade. The skin margins were undermined to an appropriate distance in all directions utilizing iris scissors. Following this, the designed flap was carried into the primary defect and sutured into place.
Repair Hemostasis (Optional): Pinpoint electrocautery
Postop Diagnosis: same
Advancement Flap (Double) Text: The defect edges were debeveled with a #15 scalpel blade.  Given the location of the defect and the proximity to free margins a double advancement flap was deemed most appropriate.  Using a sterile surgical marker, the appropriate advancement flaps were drawn incorporating the defect and placing the expected incisions within the relaxed skin tension lines where possible.    The area thus outlined was incised deep to adipose tissue with a #15 scalpel blade.  The skin margins were undermined to an appropriate distance in all directions utilizing iris scissors.
Melolabial Transposition Flap Text: The defect edges were debeveled with a #15 scalpel blade.  Given the location of the defect and the proximity to free margins a melolabial flap was deemed most appropriate.  Using a sterile surgical marker, an appropriate melolabial transposition flap was drawn incorporating the defect.    The area thus outlined was incised deep to adipose tissue with a #15 scalpel blade.  The skin margins were undermined to an appropriate distance in all directions utilizing iris scissors.
Orbicularis Oris Muscle Flap Text: The defect edges were debeveled with a #15 scalpel blade.  Given that the defect affected the competency of the oral sphincter an orbicularis oris muscle flap was deemed most appropriate to restore this competency and normal muscle function.  Using a sterile surgical marker, an appropriate flap was drawn incorporating the defect. The area thus outlined was incised with a #15 scalpel blade.
Staging Info: By selecting yes to the question above you will include information on AJCC 8 tumor staging in your Mohs note. Information on tumor staging will be automatically added for SCCs on the head and neck. AJCC 8 includes tumor size, tumor depth, perineural involvement and bone invasion.
Simple / Intermediate / Complex Repair - Final Wound Length In Cm: 3.4
Mohs Histo Method Verbiage: Each section was then chromacoded and processed in the Mohs lab using the Mohs protocol and submitted for frozen section.
Spiral Flap Text: The defect edges were debeveled with a #15 scalpel blade.  Given the location of the defect, shape of the defect and the proximity to free margins a spiral flap was deemed most appropriate.  Using a sterile surgical marker, an appropriate rotation flap was drawn incorporating the defect and placing the expected incisions within the relaxed skin tension lines where possible. The area thus outlined was incised deep to adipose tissue with a #15 scalpel blade.  The skin margins were undermined to an appropriate distance in all directions utilizing iris scissors.
V-Y Flap Text: The defect edges were debeveled with a #15 scalpel blade.  Given the location of the defect, shape of the defect and the proximity to free margins a V-Y flap was deemed most appropriate.  Using a sterile surgical marker, an appropriate advancement flap was drawn incorporating the defect and placing the expected incisions within the relaxed skin tension lines where possible.    The area thus outlined was incised deep to adipose tissue with a #15 scalpel blade.  The skin margins were undermined to an appropriate distance in all directions utilizing iris scissors.
Hemigard Intro: Due to skin fragility and wound tension, it was decided to use HEMIGARD adhesive retention suture devices to permit a linear closure. The skin was cleaned and dried for a 6cm distance away from the wound. Excessive hair, if present, was removed to allow for adhesion.
Modified Advancement Flap Text: The defect edges were debeveled with a #15 scalpel blade.  Given the location of the defect, shape of the defect and the proximity to free margins a modified advancement flap was deemed most appropriate.  Using a sterile surgical marker, an appropriate advancement flap was drawn incorporating the defect and placing the expected incisions within the relaxed skin tension lines where possible.    The area thus outlined was incised deep to adipose tissue with a #15 scalpel blade.  The skin margins were undermined to an appropriate distance in all directions utilizing iris scissors.
Mohs Case Number: [id number]
Double Island Pedicle Flap Text: The defect edges were debeveled with a #15 scalpel blade.  Given the location of the defect, shape of the defect and the proximity to free margins a double island pedicle advancement flap was deemed most appropriate.  Using a sterile surgical marker, an appropriate advancement flap was drawn incorporating the defect, outlining the appropriate donor tissue and placing the expected incisions within the relaxed skin tension lines where possible.    The area thus outlined was incised deep to adipose tissue with a #15 scalpel blade.  The skin margins were undermined to an appropriate distance in all directions around the primary defect and laterally outward around the island pedicle utilizing iris scissors.  There was minimal undermining beneath the pedicle flap.
W Plasty Text: The lesion was extirpated to the level of the fat with a #15 scalpel blade.  Given the location of the defect, shape of the defect and the proximity to free margins a W-plasty was deemed most appropriate for repair.  Using a sterile surgical marker, the appropriate transposition arms of the W-plasty were drawn incorporating the defect and placing the expected incisions within the relaxed skin tension lines where possible.    The area thus outlined was incised deep to adipose tissue with a #15 scalpel blade.  The skin margins were undermined to an appropriate distance in all directions utilizing iris scissors.  The opposing transposition arms were then transposed into place in opposite direction and anchored with interrupted buried subcutaneous sutures.
Bill 59 Modifier?: No - Continue to Bill 79 Modifier
Complex Repair And Graft Additional Text (Will Appearing After The Standard Complex Repair Text): The complex repair was not sufficient to completely close the primary defect. The remaining additional defect was repaired with the graft mentioned below.
Double Z Plasty Text: The lesion was extirpated to the level of the fat with a #15 scalpel blade. Given the location of the defect, shape of the defect and the proximity to free margins a double Z-plasty was deemed most appropriate for repair. Using a sterile surgical marker, the appropriate transposition arms of the double Z-plasty were drawn incorporating the defect and placing the expected incisions within the relaxed skin tension lines where possible. The area thus outlined was incised deep to adipose tissue with a #15 scalpel blade. The skin margins were undermined to an appropriate distance in all directions utilizing iris scissors. The opposing transposition arms were then transposed and carried over into place in opposite direction and anchored with interrupted buried subcutaneous sutures.
Full Thickness Lip Wedge Repair (Flap) Text: Given the location of the defect and the proximity to free margins a full thickness wedge repair was deemed most appropriate.  Using a sterile surgical marker, the appropriate repair was drawn incorporating the defect and placing the expected incisions perpendicular to the vermilion border.  The vermilion border was also meticulously outlined to ensure appropriate reapproximation during the repair.  The area thus outlined was incised through and through with a #15 scalpel blade.  The muscularis and dermis were reaproximated with deep sutures following hemostasis. Care was taken to realign the vermilion border before proceeding with the superficial closure.  Once the vermilion was realigned the superfical and mucosal closure was finished.
Xenograft Text: The defect edges were debeveled with a #15 scalpel blade.  Given the location of the defect, shape of the defect and the proximity to free margins a xenograft was deemed most appropriate.  The graft was then trimmed to fit the size of the defect.  The graft was then placed in the primary defect and oriented appropriately.
Double O-Z Flap Text: The defect edges were debeveled with a #15 scalpel blade.  Given the location of the defect, shape of the defect and the proximity to free margins a Double O-Z flap was deemed most appropriate.  Using a sterile surgical marker, an appropriate transposition flap was drawn incorporating the defect and placing the expected incisions within the relaxed skin tension lines where possible. The area thus outlined was incised deep to adipose tissue with a #15 scalpel blade.  The skin margins were undermined to an appropriate distance in all directions utilizing iris scissors.
Where Do You Want The Question To Include Opioid Counseling Located?: Case Summary Tab
Tumor Debulked?: curette
Zygomaticofacial Flap Text: Given the location of the defect, shape of the defect and the proximity to free margins a zygomaticofacial flap was deemed most appropriate for repair.  Using a sterile surgical marker, the appropriate flap was drawn incorporating the defect and placing the expected incisions within the relaxed skin tension lines where possible. The area thus outlined was incised deep to adipose tissue with a #15 scalpel blade with preservation of a vascular pedicle.  The skin margins were undermined to an appropriate distance in all directions utilizing iris scissors.  The flap was then placed into the defect and anchored with interrupted buried subcutaneous sutures.
Undermining Type: Entire Wound
Surgical Defect Width In Cm (Optional): 1.0
Consent (Ear)/Introductory Paragraph: The rationale for Mohs was explained to the patient and consent was obtained. The risks, benefits and alternatives to therapy were discussed in detail. Specifically, the risks of ear deformity, infection, scarring, bleeding, prolonged wound healing, incomplete removal, allergy to anesthesia, nerve injury and recurrence were addressed. Prior to the procedure, the treatment site was clearly identified and confirmed by the patient. All components of Universal Protocol/PAUSE Rule completed.
Nasalis Myocutaneous Flap Text: Using a #15 blade, an incision was made around the donor flap to the level of the nasalis muscle. Wide lateral undermining was then performed in both the subcutaneous plane above the nasalis muscle, and in a submuscular plane just above periosteum. This allowed the formation of a free nasalis muscle axial pedicle which was still attached to the actual cutaneous flap, increasing its mobility and vascular viability. Hemostasis was obtained with pinpoint electrocoagulation. The flap was mobilized into position and the pivotal anchor points positioned and stabilized with buried interrupted sutures. Subcutaneous and dermal tissues were closed in a multilayered fashion with sutures. Tissue redundancies were excised, and the epidermal edges were apposed without significant tension and sutured with sutures.
Bilobed Transposition Flap Text: The defect edges were debeveled with a #15 scalpel blade.  Given the location of the defect and the proximity to free margins a bilobed transposition flap was deemed most appropriate.  Using a sterile surgical marker, an appropriate bilobe flap drawn around the defect.    The area thus outlined was incised deep to adipose tissue with a #15 scalpel blade.  The skin margins were undermined to an appropriate distance in all directions utilizing iris scissors.
Consent (Spinal Accessory)/Introductory Paragraph: The rationale for Mohs was explained to the patient and consent was obtained. The risks, benefits and alternatives to therapy were discussed in detail. Specifically, the risks of damage to the spinal accessory nerve, infection, scarring, bleeding, prolonged wound healing, incomplete removal, allergy to anesthesia, and recurrence were addressed. Prior to the procedure, the treatment site was clearly identified and confirmed by the patient. All components of Universal Protocol/PAUSE Rule completed.
Consent 2/Introductory Paragraph: Mohs surgery was explained to the patient and consent was obtained. The risks, benefits and alternatives to therapy were discussed in detail. Specifically, the risks of infection, scarring, bleeding, prolonged wound healing, incomplete removal, allergy to anesthesia, nerve injury and recurrence were addressed. Prior to the procedure, the treatment site was clearly identified and confirmed by the patient. All components of Universal Protocol/PAUSE Rule completed.
Surgeon Performing Repair (Optional): Crystal Noel MD
Advancement-Rotation Flap Text: The defect edges were debeveled with a #15 scalpel blade.  Given the location of the defect, shape of the defect and the proximity to free margins an advancement-rotation flap was deemed most appropriate.  Using a sterile surgical marker, an appropriate flap was drawn incorporating the defect and placing the expected incisions within the relaxed skin tension lines where possible. The area thus outlined was incised deep to adipose tissue with a #15 scalpel blade.  The skin margins were undermined to an appropriate distance in all directions utilizing iris scissors.
Bilobed Flap Text: The defect edges were debeveled with a #15 scalpel blade.  Given the location of the defect and the proximity to free margins a bilobe flap was deemed most appropriate.  Using a sterile surgical marker, an appropriate bilobe flap drawn around the defect.    The area thus outlined was incised deep to adipose tissue with a #15 scalpel blade.  The skin margins were undermined to an appropriate distance in all directions utilizing iris scissors.
Ear Wedge Repair Text: A wedge excision was completed by carrying down an excision through the full thickness of the ear and cartilage with an inward facing Burow's triangle. The wound was then closed in a layered fashion.
Intermediate Repair And Flap Additional Text (Will Appearing After The Standard Complex Repair Text): The intermediate repair was not sufficient to completely close the primary defect. The remaining additional defect was repaired with the flap mentioned below.
Cartilage Graft Text: The defect edges were debeveled with a #15 scalpel blade.  Given the location of the defect, shape of the defect, the fact the defect involved a full thickness cartilage defect a cartilage graft was deemed most appropriate.  An appropriate donor site was identified, cleansed, and anesthetized. The cartilage graft was then harvested and transferred to the recipient site, oriented appropriately and then sutured into place.  The secondary defect was then repaired using a primary closure.
Post-Care Instructions: I reviewed with the patient in detail post-care instructions. Patient is not to engage in any heavy lifting, exercise, or swimming for the next 14 days. Should the patient develop any fevers, chills, bleeding, severe pain patient will contact the office immediately.
Hemostasis: Electrocautery
Dorsal Nasal Flap Text: The defect edges were debeveled with a #15 scalpel blade.  Given the location of the defect and the proximity to free margins a dorsal nasal flap was deemed most appropriate.  Using a sterile surgical marker, an appropriate dorsal nasal flap was drawn around the defect.    The area thus outlined was incised deep to adipose tissue with a #15 scalpel blade.  The skin margins were undermined to an appropriate distance in all directions utilizing iris scissors.
Localized Dermabrasion With Sand Papertext: The patient was draped in routine manner.  Localized dermabrasion using sterile sand paper was performed in routine manner to papillary dermis. This spot dermabrasion is being performed to complete skin cancer reconstruction. It also will eliminate the other sun damaged precancerous cells that are known to be part of the regional effect of a lifetime's worth of sun exposure. This localized dermabrasion is therapeutic and should not be considered cosmetic in any regard.
Secondary Intention Text (Leave Blank If You Do Not Want): The defect will heal with secondary intention.
Nostril Rim Text: The closure involved the nostril rim.
Helical Rim Text: The closure involved the helical rim.
Detail Level: Detailed
Alternatives Discussed Intro (Do Not Add Period): I discussed alternative treatments to Mohs surgery and specifically discussed the risks and benefits of
Hatchet Flap Text: The defect edges were debeveled with a #15 scalpel blade.  Given the location of the defect, shape of the defect and the proximity to free margins a hatchet flap was deemed most appropriate.  Using a sterile surgical marker, an appropriate hatchet flap was drawn incorporating the defect and placing the expected incisions within the relaxed skin tension lines where possible.    The area thus outlined was incised deep to adipose tissue with a #15 scalpel blade.  The skin margins were undermined to an appropriate distance in all directions utilizing iris scissors.
Debridement Text: The wound edges were debrided prior to proceeding with the closure to facilitate wound healing.
Partial Purse String (Simple) Text: Given the location of the defect and the characteristics of the surrounding skin a simple purse string closure was deemed most appropriate.  Undermining was performed circumfirentially around the surgical defect.  A purse string suture was then placed and tightened. Wound tension only allowed a partial closure of the circular defect.
Epidermal Closure: running cuticular
Rhombic Flap Text: The defect edges were debeveled with a #15 scalpel blade.  Given the location of the defect and the proximity to free margins a rhombic flap was deemed most appropriate.  Using a sterile surgical marker, an appropriate rhombic flap was drawn incorporating the defect.    The area thus outlined was incised deep to adipose tissue with a #15 scalpel blade.  The skin margins were undermined to an appropriate distance in all directions utilizing iris scissors.
Area L Indication Text: Tumors in this location are included in Area L (trunk and extremities).  Mohs surgery is indicated for larger tumors, or tumors with aggressive histologic features, in these anatomic locations.
Alar Island Pedicle Flap Text: The defect edges were debeveled with a #15 scalpel blade.  Given the location of the defect, shape of the defect and the proximity to the alar rim an island pedicle advancement flap was deemed most appropriate.  Using a sterile surgical marker, an appropriate advancement flap was drawn incorporating the defect, outlining the appropriate donor tissue and placing the expected incisions within the nasal ala running parallel to the alar rim. The area thus outlined was incised with a #15 scalpel blade.  The skin margins were undermined minimally to an appropriate distance in all directions around the primary defect and laterally outward around the island pedicle utilizing iris scissors.  There was minimal undermining beneath the pedicle flap.
O-L Flap Text: The defect edges were debeveled with a #15 scalpel blade.  Given the location of the defect, shape of the defect and the proximity to free margins an O-L flap was deemed most appropriate.  Using a sterile surgical marker, an appropriate advancement flap was drawn incorporating the defect and placing the expected incisions within the relaxed skin tension lines where possible.    The area thus outlined was incised deep to adipose tissue with a #15 scalpel blade.  The skin margins were undermined to an appropriate distance in all directions utilizing iris scissors.
No Repair - Repaired With Adjacent Surgical Defect Text (Leave Blank If You Do Not Want): After obtaining clear surgical margins the defect was repaired concurrently with another surgical defect which was in close approximation.
Unna Boot Text: An Unna boot was placed to help immobilize the limb and facilitate more rapid healing.
Subsequent Stages Histo Method Verbiage: Using a similar technique to that described above, a thin layer of tissue was removed from all areas where tumor was visible on the previous stage.  The tissue was again oriented, mapped, dyed, and processed as above.
Island Pedicle Flap-Requiring Vessel Identification Text: The defect edges were debeveled with a #15 scalpel blade.  Given the location of the defect, shape of the defect and the proximity to free margins an island pedicle advancement flap was deemed most appropriate.  Using a sterile surgical marker, an appropriate advancement flap was drawn, based on the axial vessel mentioned above, incorporating the defect, outlining the appropriate donor tissue and placing the expected incisions within the relaxed skin tension lines where possible.    The area thus outlined was incised deep to adipose tissue with a #15 scalpel blade.  The skin margins were undermined to an appropriate distance in all directions around the primary defect and laterally outward around the island pedicle utilizing iris scissors.  There was minimal undermining beneath the pedicle flap.
Eye Protection Verbiage: Before proceeding with the stage, a plastic scleral shield was inserted. The globe was anesthetized with a few drops of 1% lidocaine with 1:100,000 epinephrine. Then, an appropriate sized scleral shield was chosen and coated with lacrilube ointment. The shield was gently inserted and left in place for the duration of each stage. After the stage was completed, the shield was gently removed.
Consent (Lip)/Introductory Paragraph: The rationale for Mohs was explained to the patient and consent was obtained. The risks, benefits and alternatives to therapy were discussed in detail. Specifically, the risks of lip deformity, changes in the oral aperture, infection, scarring, bleeding, prolonged wound healing, incomplete removal, allergy to anesthesia, nerve injury and recurrence were addressed. Prior to the procedure, the treatment site was clearly identified and confirmed by the patient. All components of Universal Protocol/PAUSE Rule completed.
Consent Type: Consent 1 (Standard)
Burow's Advancement Flap Text: The defect edges were debeveled with a #15 scalpel blade.  Given the location of the defect and the proximity to free margins a Burow's advancement flap was deemed most appropriate.  Using a sterile surgical marker, the appropriate advancement flap was drawn incorporating the defect and placing the expected incisions within the relaxed skin tension lines where possible.    The area thus outlined was incised deep to adipose tissue with a #15 scalpel blade.  The skin margins were undermined to an appropriate distance in all directions utilizing iris scissors.
Estimated Blood Loss (Cc): minimal
Bilateral Rotation Flap Text: The defect edges were debeveled with a #15 scalpel blade. Given the location of the defect, shape of the defect and the proximity to free margins a bilateral rotation flap was deemed most appropriate. Using a sterile surgical marker, an appropriate rotation flap was drawn incorporating the defect and placing the expected incisions within the relaxed skin tension lines where possible. The area thus outlined was incised deep to adipose tissue with a #15 scalpel blade. The skin margins were undermined to an appropriate distance in all directions utilizing iris scissors. Following this, the designed flap was carried over into the primary defect and sutured into place.
Nasalis-Muscle-Based Myocutaneous Island Pedicle Flap Text: Using a #15 blade, an incision was made around the donor flap to the level of the nasalis muscle. Wide lateral undermining was then performed in both the subcutaneous plane above the nasalis muscle, and in a submuscular plane just above periosteum. This allowed the formation of a free nasalis muscle axial pedicle (based on the angular artery) which was still attached to the actual cutaneous flap, increasing its mobility and vascular viability. Hemostasis was obtained with pinpoint electrocoagulation. The flap was mobilized into position and the pivotal anchor points positioned and stabilized with buried interrupted sutures. Subcutaneous and dermal tissues were closed in a multilayered fashion with sutures. Tissue redundancies were excised, and the epidermal edges were apposed without significant tension and sutured with sutures.
O-T Plasty Text: The defect edges were debeveled with a #15 scalpel blade.  Given the location of the defect, shape of the defect and the proximity to free margins an O-T plasty was deemed most appropriate.  Using a sterile surgical marker, an appropriate O-T plasty was drawn incorporating the defect and placing the expected incisions within the relaxed skin tension lines where possible.    The area thus outlined was incised deep to adipose tissue with a #15 scalpel blade.  The skin margins were undermined to an appropriate distance in all directions utilizing iris scissors.
A-T Advancement Flap Text: The defect edges were debeveled with a #15 scalpel blade.  Given the location of the defect, shape of the defect and the proximity to free margins an A-T advancement flap was deemed most appropriate.  Using a sterile surgical marker, an appropriate advancement flap was drawn incorporating the defect and placing the expected incisions within the relaxed skin tension lines where possible.    The area thus outlined was incised deep to adipose tissue with a #15 scalpel blade.  The skin margins were undermined to an appropriate distance in all directions utilizing iris scissors.
Consent (Marginal Mandibular)/Introductory Paragraph: The rationale for Mohs was explained to the patient and consent was obtained. The risks, benefits and alternatives to therapy were discussed in detail. Specifically, the risks of damage to the marginal mandibular branch of the facial nerve, infection, scarring, bleeding, prolonged wound healing, incomplete removal, allergy to anesthesia, and recurrence were addressed. Prior to the procedure, the treatment site was clearly identified and confirmed by the patient. All components of Universal Protocol/PAUSE Rule completed.
Consent 1/Introductory Paragraph: The rationale for Mohs was explained to the patient and consent was obtained. The risks, benefits and alternatives to therapy were discussed in detail. Specifically, the risks of infection, scarring, bleeding, prolonged wound healing, incomplete removal, allergy to anesthesia, nerve injury and recurrence were addressed. Prior to the procedure, the treatment site was clearly identified and confirmed by the patient. All components of Universal Protocol/PAUSE Rule completed.
Wound Check: 6 weeks
Rectangular Flap Text: The defect edges were debeveled with a #15 scalpel blade. Given the location of the defect and the proximity to free margins a rectangular flap was deemed most appropriate. Using a sterile surgical marker, an appropriate rectangular flap was drawn incorporating the defect. The area thus outlined was incised deep to adipose tissue with a #15 scalpel blade. The skin margins were undermined to an appropriate distance in all directions utilizing iris scissors. Following this, the designed flap was carried over into the primary defect and sutured into place.
Tumor Depth: Less than 6mm from granular layer and no invasion beyond the subcutaneous fat
Z Plasty Text: The lesion was extirpated to the level of the fat with a #15 scalpel blade.  Given the location of the defect, shape of the defect and the proximity to free margins a Z-plasty was deemed most appropriate for repair.  Using a sterile surgical marker, the appropriate transposition arms of the Z-plasty were drawn incorporating the defect and placing the expected incisions within the relaxed skin tension lines where possible.    The area thus outlined was incised deep to adipose tissue with a #15 scalpel blade.  The skin margins were undermined to an appropriate distance in all directions utilizing iris scissors.  The opposing transposition arms were then transposed into place in opposite direction and anchored with interrupted buried subcutaneous sutures.
Mastoid Interpolation Flap Text: A decision was made to reconstruct the defect utilizing an interpolation axial flap and a staged reconstruction.  A telfa template was made of the defect.  This telfa template was then used to outline the mastoid interpolation flap.  The donor area for the pedicle flap was then injected with anesthesia.  The flap was excised through the skin and subcutaneous tissue down to the layer of the underlying musculature.  The pedicle flap was carefully excised within this deep plane to maintain its blood supply.  The edges of the donor site were undermined.   The donor site was closed in a primary fashion.  The pedicle was then rotated into position and sutured.  Once the tube was sutured into place, adequate blood supply was confirmed with blanching and refill.  The pedicle was then wrapped with xeroform gauze and dressed appropriately with a telfa and gauze bandage to ensure continued blood supply and protect the attached pedicle.
Consent (Nose)/Introductory Paragraph: The rationale for Mohs was explained to the patient and consent was obtained. The risks, benefits and alternatives to therapy were discussed in detail. Specifically, the risks of nasal deformity, changes in the flow of air through the nose, infection, scarring, bleeding, prolonged wound healing, incomplete removal, allergy to anesthesia, nerve injury and recurrence were addressed. Prior to the procedure, the treatment site was clearly identified and confirmed by the patient. All components of Universal Protocol/PAUSE Rule completed.
Wound Care: Vaseline
Banner Transposition Flap Text: The defect edges were debeveled with a #15 scalpel blade.  Given the location of the defect and the proximity to free margins a Banner transposition flap was deemed most appropriate.  Using a sterile surgical marker, an appropriate flap drawn around the defect. The area thus outlined was incised deep to adipose tissue with a #15 scalpel blade.  The skin margins were undermined to an appropriate distance in all directions utilizing iris scissors.
Trilobed Flap Text: The defect edges were debeveled with a #15 scalpel blade.  Given the location of the defect and the proximity to free margins a trilobed flap was deemed most appropriate.  Using a sterile surgical marker, an appropriate trilobed flap drawn around the defect.    The area thus outlined was incised deep to adipose tissue with a #15 scalpel blade.  The skin margins were undermined to an appropriate distance in all directions utilizing iris scissors.
Star Wedge Flap Text: The defect edges were debeveled with a #15 scalpel blade.  Given the location of the defect, shape of the defect and the proximity to free margins a star wedge flap was deemed most appropriate.  Using a sterile surgical marker, an appropriate rotation flap was drawn incorporating the defect and placing the expected incisions within the relaxed skin tension lines where possible. The area thus outlined was incised deep to adipose tissue with a #15 scalpel blade.  The skin margins were undermined to an appropriate distance in all directions utilizing iris scissors.
No Residual Tumor Seen Histology Text: There were no malignant cells seen in the sections examined.
Double O-Z Plasty Text: The defect edges were debeveled with a #15 scalpel blade.  Given the location of the defect, shape of the defect and the proximity to free margins a Double O-Z plasty (double transposition flap) was deemed most appropriate.  Using a sterile surgical marker, the appropriate transposition flaps were drawn incorporating the defect and placing the expected incisions within the relaxed skin tension lines where possible. The area thus outlined was incised deep to adipose tissue with a #15 scalpel blade.  The skin margins were undermined to an appropriate distance in all directions utilizing iris scissors.  Hemostasis was achieved with electrocautery.  The flaps were then transposed into place, one clockwise and the other counterclockwise, and anchored with interrupted buried subcutaneous sutures.
Pinch Graft Text: The defect edges were debeveled with a #15 scalpel blade. Given the location of the defect, shape of the defect and the proximity to free margins a pinch graft was deemed most appropriate. Using a sterile surgical marker, the primary defect shape was transferred to the donor site. The area thus outlined was incised deep to adipose tissue with a #15 scalpel blade.  The harvested graft was then trimmed of adipose tissue until only dermis and epidermis was left. The skin margins of the secondary defect were undermined to an appropriate distance in all directions utilizing iris scissors.  The secondary defect was closed with interrupted buried subcutaneous sutures.  The skin edges were then re-apposed with running  sutures.  The skin graft was then placed in the primary defect and oriented appropriately.
Deep Sutures: 5-0 Maxon
Vermilion Border Text: The closure involved the vermilion border.
Ftsg Text: The defect edges were debeveled with a #15 scalpel blade.  Given the location of the defect, shape of the defect and the proximity to free margins a full thickness skin graft was deemed most appropriate.  Using a sterile surgical marker, the primary defect shape was transferred to the donor site. The area thus outlined was incised deep to adipose tissue with a #15 scalpel blade.  The harvested graft was then trimmed of adipose tissue until only dermis and epidermis was left.  The skin margins of the secondary defect were undermined to an appropriate distance in all directions utilizing iris scissors.  The secondary defect was closed with interrupted buried subcutaneous sutures.  The skin edges were then re-apposed with running  sutures.  The skin graft was then placed in the primary defect and oriented appropriately.
Nasal Turnover Hinge Flap Text: The defect edges were debeveled with a #15 scalpel blade.  Given the size, depth, location of the defect and the defect being full thickness a nasal turnover hinge flap was deemed most appropriate.  Using a sterile surgical marker, an appropriate hinge flap was drawn incorporating the defect. The area thus outlined was incised with a #15 scalpel blade. The flap was designed to recreate the nasal mucosal lining and the alar rim. The skin margins were undermined to an appropriate distance in all directions utilizing iris scissors.
Mercedes Flap Text: The defect edges were debeveled with a #15 scalpel blade.  Given the location of the defect, shape of the defect and the proximity to free margins a Mercedes flap was deemed most appropriate.  Using a sterile surgical marker, an appropriate advancement flap was drawn incorporating the defect and placing the expected incisions within the relaxed skin tension lines where possible. The area thus outlined was incised deep to adipose tissue with a #15 scalpel blade.  The skin margins were undermined to an appropriate distance in all directions utilizing iris scissors.
Tissue Cultured Epidermal Autograft Text: The defect edges were debeveled with a #15 scalpel blade.  Given the location of the defect, shape of the defect and the proximity to free margins a tissue cultured epidermal autograft was deemed most appropriate.  The graft was then trimmed to fit the size of the defect.  The graft was then placed in the primary defect and oriented appropriately.
Undermining Location (Optional): in the superficial subcutaneous fat
Mucosal Advancement Flap Text: Given the location of the defect, shape of the defect and the proximity to free margins a mucosal advancement flap was deemed most appropriate. Incisions were made with a 15 blade scalpel in the appropriate fashion along the cutaneous vermilion border and the mucosal lip. The remaining actinically damaged mucosal tissue was excised.  The mucosal advancement flap was then elevated to the gingival sulcus with care taken to preserve the neurovascular structures and advanced into the primary defect. Care was taken to ensure that precise realignment of the vermilion border was achieved.
H Plasty Text: Given the location of the defect, shape of the defect and the proximity to free margins a H-plasty was deemed most appropriate for repair.  Using a sterile surgical marker, the appropriate advancement arms of the H-plasty were drawn incorporating the defect and placing the expected incisions within the relaxed skin tension lines where possible. The area thus outlined was incised deep to adipose tissue with a #15 scalpel blade. The skin margins were undermined to an appropriate distance in all directions utilizing iris scissors.  The opposing advancement arms were then advanced into place in opposite direction and anchored with interrupted buried subcutaneous sutures.
Area M Indication Text: Tumors in this location are included in Area M (cheek, forehead, scalp, neck, jawline and pretibial skin).  Mohs surgery is indicated for tumors in these anatomic locations.
Mart-1 - Positive Histology Text: MART-1 staining demonstrates areas of higher density and clustering of melanocytes with Pagetoid spread upwards within the epidermis. The surgical margins are positive for tumor cells.
Tarsorrhaphy Text: A tarsorrhaphy was performed using Frost sutures.
Closure 2 Information: This tab is for additional flaps and grafts, including complex repair and grafts and complex repair and flaps. You can also specify a different location for the additional defect, if the location is the same you do not need to select a new one. We will insert the automated text for the repair you select below just as we do for solitary flaps and grafts. Please note that at this time if you select a location with a different insurance zone you will need to override the ICD10 and CPT if appropriate.
Hemigard Postcare Instructions: The HEMIGARD strips are to remain completely dry for at least 5-7 days.
Mart-1 - Negative Histology Text: MART-1 staining demonstrates a normal density and pattern of melanocytes along the dermal-epidermal junction. The surgical margins are negative for tumor cells.
O-Z Plasty Text: The defect edges were debeveled with a #15 scalpel blade.  Given the location of the defect, shape of the defect and the proximity to free margins an O-Z plasty (double transposition flap) was deemed most appropriate.  Using a sterile surgical marker, the appropriate transposition flaps were drawn incorporating the defect and placing the expected incisions within the relaxed skin tension lines where possible.    The area thus outlined was incised deep to adipose tissue with a #15 scalpel blade.  The skin margins were undermined to an appropriate distance in all directions utilizing iris scissors.  Hemostasis was achieved with electrocautery.  The flaps were then transposed into place, one clockwise and the other counterclockwise, and anchored with interrupted buried subcutaneous sutures.
Paramedian Forehead Flap Text: A decision was made to reconstruct the defect utilizing an interpolation axial flap and a staged reconstruction.  A telfa template was made of the defect.  This telfa template was then used to outline the paramedian forehead pedicle flap.  The donor area for the pedicle flap was then injected with anesthesia.  The flap was excised through the skin and subcutaneous tissue down to the layer of the underlying musculature.  The pedicle flap was carefully excised within this deep plane to maintain its blood supply.  The edges of the donor site were undermined.   The donor site was closed in a primary fashion.  The pedicle was then rotated into position and sutured.  Once the tube was sutured into place, adequate blood supply was confirmed with blanching and refill.  The pedicle was then wrapped with xeroform gauze and dressed appropriately with a telfa and gauze bandage to ensure continued blood supply and protect the attached pedicle.
Donor Site Anesthesia Type: same as repair anesthesia
Ear Star Wedge Flap Text: The defect edges were debeveled with a #15 blade scalpel.  Given the location of the defect and the proximity to free margins (helical rim) an ear star wedge flap was deemed most appropriate.  Using a sterile surgical marker, the appropriate flap was drawn incorporating the defect and placing the expected incisions between the helical rim and antihelix where possible.  The area thus outlined was incised through and through with a #15 scalpel blade.
Bcc Infiltrative Histology Text: There were numerous aggregates of basaloid cells demonstrating an infiltrative pattern.
Complex Repair And Flap Additional Text (Will Appearing After The Standard Complex Repair Text): The complex repair was not sufficient to completely close the primary defect. The remaining additional defect was repaired with the flap mentioned below.
Bi-Rhombic Flap Text: The defect edges were debeveled with a #15 scalpel blade.  Given the location of the defect and the proximity to free margins a bi-rhombic flap was deemed most appropriate.  Using a sterile surgical marker, an appropriate rhombic flap was drawn incorporating the defect. The area thus outlined was incised deep to adipose tissue with a #15 scalpel blade.  The skin margins were undermined to an appropriate distance in all directions utilizing iris scissors.
Anesthesia Volume In Cc: 3
Retention Suture Bite Size: 1 mm
V-Y Plasty Text: The defect edges were debeveled with a #15 scalpel blade.  Given the location of the defect, shape of the defect and the proximity to free margins an V-Y advancement flap was deemed most appropriate.  Using a sterile surgical marker, an appropriate advancement flap was drawn incorporating the defect and placing the expected incisions within the relaxed skin tension lines where possible.    The area thus outlined was incised deep to adipose tissue with a #15 scalpel blade.  The skin margins were undermined to an appropriate distance in all directions utilizing iris scissors.
Chonodrocutaneous Helical Advancement Flap Text: The defect edges were debeveled with a #15 scalpel blade.  Given the location of the defect and the proximity to free margins a chondrocutaneous helical advancement flap was deemed most appropriate.  Using a sterile surgical marker, the appropriate advancement flap was drawn incorporating the defect and placing the expected incisions within the relaxed skin tension lines where possible.    The area thus outlined was incised deep to adipose tissue with a #15 scalpel blade.  The skin margins were undermined to an appropriate distance in all directions utilizing iris scissors.
Eyelid Partial Thickness Repair - 17357: The eyelid defect was partial thickness which required a wedge repair of the eyelid. Special care was taken to ensure that the eyelid margin was realligned when placing sutures.
Repair Anesthesia Method: local infiltration

## 2025-02-06 RX ORDER — AMLODIPINE BESYLATE 10 MG/1
10 TABLET ORAL
Qty: 90 TABLET | Refills: 3 | Status: SHIPPED | OUTPATIENT
Start: 2025-02-06

## 2025-02-10 DIAGNOSIS — R79.89 LOW TESTOSTERONE LEVEL IN MALE: ICD-10-CM

## 2025-02-10 RX ORDER — TESTOSTERONE 20.25 MG/1.25G
GEL TOPICAL
Qty: 75 G | Refills: 0 | Status: SHIPPED | OUTPATIENT
Start: 2025-02-10 | End: 2025-04-11

## 2025-02-12 ENCOUNTER — APPOINTMENT (OUTPATIENT)
Dept: URBAN - METROPOLITAN AREA CLINIC 36 | Facility: CLINIC | Age: 70
Setting detail: DERMATOLOGY
End: 2025-02-12

## 2025-02-12 DIAGNOSIS — Z48.02 ENCOUNTER FOR REMOVAL OF SUTURES: ICD-10-CM

## 2025-02-12 PROCEDURE — ? SUTURE REMOVAL (GLOBAL PERIOD)

## 2025-02-12 ASSESSMENT — LOCATION SIMPLE DESCRIPTION DERM: LOCATION SIMPLE: RIGHT EYEBROW

## 2025-02-12 ASSESSMENT — LOCATION ZONE DERM: LOCATION ZONE: FACE

## 2025-02-12 ASSESSMENT — LOCATION DETAILED DESCRIPTION DERM: LOCATION DETAILED: RIGHT MEDIAL EYEBROW

## 2025-02-12 NOTE — PROCEDURE: SUTURE REMOVAL (GLOBAL PERIOD)
Detail Level: Detailed
Add 51440 Cpt? (Important Note: In 2017 The Use Of 35902 Is Being Tracked By Cms To Determine Future Global Period Reimbursement For Global Periods): no

## 2025-03-12 ENCOUNTER — APPOINTMENT (OUTPATIENT)
Dept: URBAN - METROPOLITAN AREA CLINIC 36 | Facility: CLINIC | Age: 70
Setting detail: DERMATOLOGY
End: 2025-03-12

## 2025-03-12 DIAGNOSIS — Z48.817 ENCOUNTER FOR SURGICAL AFTERCARE FOLLOWING SURGERY ON THE SKIN AND SUBCUTANEOUS TISSUE: ICD-10-CM

## 2025-03-12 PROCEDURE — ? POST-OP WOUND CHECK

## 2025-03-12 ASSESSMENT — LOCATION DETAILED DESCRIPTION DERM: LOCATION DETAILED: RIGHT MEDIAL EYEBROW

## 2025-03-12 ASSESSMENT — LOCATION ZONE DERM: LOCATION ZONE: FACE

## 2025-03-12 ASSESSMENT — LOCATION SIMPLE DESCRIPTION DERM: LOCATION SIMPLE: RIGHT EYEBROW

## 2025-03-12 NOTE — PROCEDURE: POST-OP WOUND CHECK
Detail Level: Detailed
Add 48371 Cpt? (Important Note: In 2017 The Use Of 54733 Is Being Tracked By Cms To Determine Future Global Period Reimbursement For Global Periods): no

## 2025-03-25 DIAGNOSIS — N52.8 OTHER MALE ERECTILE DYSFUNCTION: ICD-10-CM

## 2025-03-25 RX ORDER — TADALAFIL 20 MG/1
20 TABLET ORAL PRN
Qty: 10 TABLET | Refills: 6 | Status: SHIPPED | OUTPATIENT
Start: 2025-03-25

## 2025-04-08 DIAGNOSIS — R79.89 LOW TESTOSTERONE LEVEL IN MALE: ICD-10-CM

## 2025-04-08 RX ORDER — TESTOSTERONE 20.25 MG/1.25G
GEL TOPICAL
Qty: 75 G | Refills: 0 | Status: SHIPPED | OUTPATIENT
Start: 2025-04-08 | End: 2025-06-07

## 2025-05-14 DIAGNOSIS — R79.89 LOW TESTOSTERONE LEVEL IN MALE: ICD-10-CM

## 2025-05-14 RX ORDER — TESTOSTERONE 20.25 MG/1.25G
GEL TOPICAL
Qty: 75 G | Refills: 3 | Status: SHIPPED | OUTPATIENT
Start: 2025-05-14 | End: 2025-07-13

## 2025-06-10 ENCOUNTER — APPOINTMENT (OUTPATIENT)
Dept: URBAN - METROPOLITAN AREA CLINIC 35 | Facility: CLINIC | Age: 70
Setting detail: DERMATOLOGY
End: 2025-06-10

## 2025-06-10 DIAGNOSIS — L82.1 OTHER SEBORRHEIC KERATOSIS: ICD-10-CM

## 2025-06-10 DIAGNOSIS — L81.4 OTHER MELANIN HYPERPIGMENTATION: ICD-10-CM

## 2025-06-10 DIAGNOSIS — Z87.2 PERSONAL HISTORY OF DISEASES OF THE SKIN AND SUBCUTANEOUS TISSUE: ICD-10-CM

## 2025-06-10 DIAGNOSIS — Z85.828 PERSONAL HISTORY OF OTHER MALIGNANT NEOPLASM OF SKIN: ICD-10-CM

## 2025-06-10 DIAGNOSIS — Z71.89 OTHER SPECIFIED COUNSELING: ICD-10-CM

## 2025-06-10 DIAGNOSIS — L20.89 OTHER ATOPIC DERMATITIS: ICD-10-CM

## 2025-06-10 DIAGNOSIS — L57.0 ACTINIC KERATOSIS: ICD-10-CM

## 2025-06-10 DIAGNOSIS — D18.0 HEMANGIOMA: ICD-10-CM

## 2025-06-10 DIAGNOSIS — D22 MELANOCYTIC NEVI: ICD-10-CM

## 2025-06-10 PROBLEM — D22.5 MELANOCYTIC NEVI OF TRUNK: Status: ACTIVE | Noted: 2025-06-10

## 2025-06-10 PROBLEM — D18.01 HEMANGIOMA OF SKIN AND SUBCUTANEOUS TISSUE: Status: ACTIVE | Noted: 2025-06-10

## 2025-06-10 PROCEDURE — ? TREATMENT REGIMEN

## 2025-06-10 PROCEDURE — ? SUNSCREEN RECOMMENDATIONS

## 2025-06-10 PROCEDURE — ? LIQUID NITROGEN

## 2025-06-10 PROCEDURE — ? COUNSELING

## 2025-06-10 ASSESSMENT — LOCATION DETAILED DESCRIPTION DERM
LOCATION DETAILED: RIGHT SUPERIOR LATERAL NECK
LOCATION DETAILED: LEFT MEDIAL UPPER BACK
LOCATION DETAILED: RIGHT POSTERIOR SHOULDER
LOCATION DETAILED: LEFT LATERAL INFERIOR CHEST
LOCATION DETAILED: RIGHT PROXIMAL DORSAL FOREARM
LOCATION DETAILED: RIGHT LATERAL PROXIMAL CALF
LOCATION DETAILED: RIGHT FOREHEAD
LOCATION DETAILED: LEFT PROXIMAL PRETIBIAL REGION
LOCATION DETAILED: LEFT INFERIOR UPPER BACK
LOCATION DETAILED: RIGHT PROXIMAL POSTERIOR UPPER ARM
LOCATION DETAILED: RIGHT SUPERIOR LATERAL UPPER BACK
LOCATION DETAILED: LEFT MID-UPPER BACK
LOCATION DETAILED: RIGHT DISTAL PRETIBIAL REGION
LOCATION DETAILED: RIGHT DISTAL CALF
LOCATION DETAILED: LEFT PROXIMAL LATERAL PRETIBIAL REGION
LOCATION DETAILED: LEFT SUPERIOR UPPER BACK
LOCATION DETAILED: RIGHT DISTAL LATERAL PRETIBIAL REGION
LOCATION DETAILED: LEFT PROXIMAL DORSAL FOREARM
LOCATION DETAILED: RIGHT SUPERIOR MEDIAL MIDBACK

## 2025-06-10 ASSESSMENT — LOCATION SIMPLE DESCRIPTION DERM
LOCATION SIMPLE: LEFT UPPER BACK
LOCATION SIMPLE: RIGHT PRETIBIAL REGION
LOCATION SIMPLE: RIGHT LOWER LEG
LOCATION SIMPLE: NECK
LOCATION SIMPLE: RIGHT UPPER BACK
LOCATION SIMPLE: LEFT PRETIBIAL REGION
LOCATION SIMPLE: RIGHT SHOULDER
LOCATION SIMPLE: RIGHT CALF
LOCATION SIMPLE: CHEST
LOCATION SIMPLE: RIGHT UPPER ARM
LOCATION SIMPLE: LEFT LOWER LEG
LOCATION SIMPLE: RIGHT LOWER BACK
LOCATION SIMPLE: LEFT FOREARM
LOCATION SIMPLE: RIGHT FOREHEAD
LOCATION SIMPLE: RIGHT FOREARM

## 2025-06-10 ASSESSMENT — LOCATION ZONE DERM
LOCATION ZONE: FACE
LOCATION ZONE: LEG
LOCATION ZONE: NECK
LOCATION ZONE: TRUNK
LOCATION ZONE: ARM

## 2025-07-12 SDOH — HEALTH STABILITY: MENTAL HEALTH
STRESS IS WHEN SOMEONE FEELS TENSE, NERVOUS, ANXIOUS, OR CAN'T SLEEP AT NIGHT BECAUSE THEIR MIND IS TROUBLED. HOW STRESSED ARE YOU?: ONLY A LITTLE

## 2025-07-12 SDOH — HEALTH STABILITY: PHYSICAL HEALTH: ON AVERAGE, HOW MANY MINUTES DO YOU ENGAGE IN EXERCISE AT THIS LEVEL?: 60 MIN

## 2025-07-12 SDOH — ECONOMIC STABILITY: HOUSING INSECURITY

## 2025-07-12 SDOH — ECONOMIC STABILITY: TRANSPORTATION INSECURITY

## 2025-07-12 SDOH — HEALTH STABILITY: PHYSICAL HEALTH: ON AVERAGE, HOW MANY DAYS PER WEEK DO YOU ENGAGE IN MODERATE TO STRENUOUS EXERCISE (LIKE A BRISK WALK)?: 5 DAYS

## 2025-07-15 ENCOUNTER — OFFICE VISIT (OUTPATIENT)
Dept: MEDICAL GROUP | Facility: LAB | Age: 70
End: 2025-07-15
Payer: MEDICARE

## 2025-07-15 VITALS
DIASTOLIC BLOOD PRESSURE: 80 MMHG | SYSTOLIC BLOOD PRESSURE: 132 MMHG | OXYGEN SATURATION: 94 % | TEMPERATURE: 97.8 F | HEART RATE: 59 BPM | HEIGHT: 73 IN | WEIGHT: 193 LBS | BODY MASS INDEX: 25.58 KG/M2

## 2025-07-15 DIAGNOSIS — E78.5 DYSLIPIDEMIA: ICD-10-CM

## 2025-07-15 DIAGNOSIS — Z12.5 PROSTATE CANCER SCREENING: ICD-10-CM

## 2025-07-15 DIAGNOSIS — H61.23 HEARING LOSS OF BOTH EARS DUE TO CERUMEN IMPACTION: ICD-10-CM

## 2025-07-15 DIAGNOSIS — I10 ESSENTIAL HYPERTENSION: Chronic | ICD-10-CM

## 2025-07-15 DIAGNOSIS — R93.1 ELEVATED CORONARY ARTERY CALCIUM SCORE: ICD-10-CM

## 2025-07-15 DIAGNOSIS — R97.20 INCREASED PROSTATE SPECIFIC ANTIGEN (PSA) VELOCITY: ICD-10-CM

## 2025-07-15 DIAGNOSIS — Z00.00 ENCOUNTER FOR PREVENTATIVE ADULT HEALTH CARE EXAMINATION: Primary | ICD-10-CM

## 2025-07-15 DIAGNOSIS — R79.89 LOW TESTOSTERONE LEVEL IN MALE: Chronic | ICD-10-CM

## 2025-07-15 PROCEDURE — 3075F SYST BP GE 130 - 139MM HG: CPT | Performed by: PHYSICIAN ASSISTANT

## 2025-07-15 PROCEDURE — 99214 OFFICE O/P EST MOD 30 MIN: CPT | Mod: 25 | Performed by: PHYSICIAN ASSISTANT

## 2025-07-15 PROCEDURE — 69210 REMOVE IMPACTED EAR WAX UNI: CPT | Performed by: PHYSICIAN ASSISTANT

## 2025-07-15 PROCEDURE — 3079F DIAST BP 80-89 MM HG: CPT | Performed by: PHYSICIAN ASSISTANT

## 2025-07-15 RX ORDER — TESTOSTERONE 20.25 MG/1.25G
GEL TOPICAL
Qty: 75 G | Refills: 0 | Status: SHIPPED | OUTPATIENT
Start: 2025-07-15 | End: 2025-08-15

## 2025-07-15 ASSESSMENT — FIBROSIS 4 INDEX: FIB4 SCORE: 1.04

## 2025-07-15 ASSESSMENT — PATIENT HEALTH QUESTIONNAIRE - PHQ9: CLINICAL INTERPRETATION OF PHQ2 SCORE: 0

## 2025-07-15 NOTE — PROGRESS NOTES
"Subjective  Evan Wright is a 69 y.o. male who presents today to establish care.    Medications:  Tadalafil 20 mg as needed for erectile dysfunction (max 1 dose per 24 hours)  Amlodipine 10 mg tablet daily  Hydrochlorothiazide 25 mg tablet daily  Clonidine 0.1 mg tablet daily  Omeprazole 20 mg delayed release capsule twice daily  Atorvastatin 10 mg tablet daily -discontinued due to worsening muscle pain.  Previously on rosuvastatin, with same side effects.     Medical decision making from cardiologist:  \"Positive CT coronary calcium study:  He is a 68 year old male with positive CT coronary calcium study, hypertension and hyperlipidemia, premature ventricular contractions.      Hypertension: Currently, the average blood pressure is well controlled. We will continue with amlodipine, hydrochlorothiazide, clonidine.  Premature ventricular contractions: He is clinically doing well on beta blockade therapy.  Medication intolerance: He is intolerant to losartan due to side effects and beta blockade therapy due to bradycardia and rosuvastatin due to myalgia. \"      Tachycardia, hypertension, nonsustained ventricular tachycardia  - Followed by cardiology  - Positive CT coronary calcium study  - Has continued with current medication therapy  - Hypertension: Well-controlled on current medication regimen.  Continue amlodipine, hydrochlorothiazide, clonidine    Hyperlipidemia:   Patient experienced severe muscle aches from rosuvastatin, was switched to atorvastatin daily.  Patient experienced continued muscle aches, fatigue, with subsequent weight gain due to decreased physical activity due to side effects.  Patient has been off of atorvastatin, and has been experiencing significant relief of symptoms.  He is much more active, he uses his Pel[employer] daily, and loves to road bike.  He would like to discuss other options moving forward.  He would like to continue to avoid statin therapy.    He is on omeprazole for his stomach. " He had a CT scan a year ago. He had a hernia and blockage. He had acid reflux for many years and inflammation in the esophagus. He experienced choking on various foods for 20 years. He had an endoscopy and colonoscopy.     Health maintenance  Oral hygiene: Dental cleanings every 6 months, and sees a dentist every year  Eye care: Sees an optometrist yearly  Mental health: Well-groomed, does not report any recent adverse events  Sleep: Sleeping well, did not report trouble falling asleep or staying asleep  Diet/exercise: Balanced diet, exercises regularly.  BM: Daily, soft.  Not straining  : Denied urgency, frequency, nocturia, intermittency, hesitancy, weak urinary stream, dysuria, and hematuria  Low testosterone: Patient was diagnosed with low testosterone over 5 years ago.  He has been using testosterone 1.62% gel daily for symptom relief.  Patient has no adverse side effects from his current dose, and would like to continue with today.    Family History   Problem Relation Age of Onset    Hypertension Mother     Cancer Father         liver CA    Alcohol/Drug Father     Hypertension Father     No Known Problems Son     No Known Problems Daughter     Heart Disease Neg Hx        Social History     Socioeconomic History    Marital status:      Spouse name: Not on file    Number of children: 2    Years of education: Not on file    Highest education level: Not on file   Occupational History    Occupation: Retired   Tobacco Use    Smoking status: Never    Smokeless tobacco: Never   Vaping Use    Vaping status: Never Used   Substance and Sexual Activity    Alcohol use: Yes     Comment: wine daily 1-2/nt    Drug use: No    Sexual activity: Yes     Partners: Female     Birth control/protection: Post-Menopausal   Other Topics Concern    Not on file   Social History Narrative    Retired business owner, 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      2 grown kids     Social Drivers of Health     Financial Resource Strain: Not on file  "  Food Insecurity: Not on file   Transportation Needs: No Transportation Needs (6/7/2021)    PRAPARE - Transportation     Lack of Transportation (Medical): No     Lack of Transportation (Non-Medical): No   Physical Activity: Sufficiently Active (7/12/2025)    Exercise Vital Sign     Days of Exercise per Week: 5 days     Minutes of Exercise per Session: 60 min   Stress: No Stress Concern Present (7/12/2025)    [employer] - Occupational Stress Questionnaire     Feeling of Stress : Only a little   Social Connections: Not on file   Intimate Partner Violence: Not on file   Housing Stability: Not on file       Past Surgical History[1]    Past Medical History[2]    Current Medications[3]    Allergies[4]    Objective  /80 (BP Location: Left arm, Patient Position: Sitting, BP Cuff Size: Adult)   Pulse (!) 59   Temp 36.6 °C (97.8 °F) (Temporal)   Ht 1.854 m (6' 1\")   Wt 87.5 kg (193 lb)   SpO2 94%   Physical Exam  Constitutional:       Appearance: Normal appearance.   HENT:      Right Ear: Tympanic membrane, ear canal and external ear normal. There is impacted cerumen.      Left Ear: Tympanic membrane, ear canal and external ear normal. There is impacted cerumen.      Mouth/Throat:      Mouth: Mucous membranes are moist.      Pharynx: Oropharynx is clear. No oropharyngeal exudate or posterior oropharyngeal erythema.   Eyes:      Extraocular Movements: Extraocular movements intact.      Pupils: Pupils are equal, round, and reactive to light.   Cardiovascular:      Rate and Rhythm: Normal rate and regular rhythm.   Pulmonary:      Effort: Pulmonary effort is normal.      Breath sounds: Normal breath sounds.   Musculoskeletal:      Cervical back: Normal range of motion.      Right lower leg: No edema.      Left lower leg: No edema.   Skin:     General: Skin is warm and dry.   Neurological:      General: No focal deficit present.      Mental Status: He is alert and oriented to person, " place, and time.   Psychiatric:         Mood and Affect: Mood normal.         Behavior: Behavior normal.         Thought Content: Thought content normal.         Labs:   Component      Latest Ref Rng 11/26/2024 12/3/2024   Sodium      135 - 145 mmol/L  142    Potassium      3.6 - 5.5 mmol/L  3.7    Chloride      96 - 112 mmol/L  103    Co2      20 - 33 mmol/L  26    Anion Gap      7.0 - 16.0   13.0    Glucose      65 - 99 mg/dL  66    Bun      8 - 22 mg/dL  25 (H)    Creatinine      0.50 - 1.40 mg/dL  1.34    Calcium      8.5 - 10.5 mg/dL  9.8    Correct Calcium      8.5 - 10.5 mg/dL  9.2    AST(SGOT)      12 - 45 U/L  24    ALT(SGPT)      2 - 50 U/L  36    Alkaline Phosphatase      30 - 99 U/L  62    Total Bilirubin      0.1 - 1.5 mg/dL  0.7    Albumin      3.2 - 4.9 g/dL  4.8    Total Protein      6.0 - 8.2 g/dL  6.1    Globulin      1.9 - 3.5 g/dL  1.3 (L)    A-G Ratio      g/dL  3.7    WBC      4.8 - 10.8 K/uL 6.4     RBC      4.70 - 6.10 M/uL 5.04     Hemoglobin      14.0 - 18.0 g/dL 15.4     Hematocrit      42.0 - 52.0 % 44.1     MCV      81.4 - 97.8 fL 87.5     MCH      27.0 - 33.0 pg 30.6     MCHC      32.3 - 36.5 g/dL 34.9     RDW      35.9 - 50.0 fL 39.0     Platelet Count      164 - 446 K/uL 273     MPV      9.0 - 12.9 fL 10.3     Testosterone,Total      300 - 720 ng/dL 509     Sex Hormone Bind Globulin      19 - 76 nmol/L 47     Free Testosterone      47 - 244 pg/mL 78     Testosterone % Free      1.6 - 2.9 % 1.5 (L)     Cholesterol,Tot      100 - 199 mg/dL 137     Triglycerides      0 - 149 mg/dL 64     HDL      >=40 mg/dL 65     LDL      <100 mg/dL 59     Glycohemoglobin      4.0 - 5.6 % 5.5     Estim. Avg Glu      mg/dL 111     Vitamin B12 -True Cobalamin      211 - 911 pg/mL 616     TSH      0.350 - 5.500 uIU/mL 2.830     Prostatic Specific Antigen Tot      0.00 - 4.00 ng/mL 3.19     GFR (CKD-EPI)      >60 mL/min/1.73 m 2  57 !       Legend:  (L) Low  (H) High  ! Abnormal    Testosterone, Free &  "Total, Adult Male (w/SHBG)  Dx: Medication management    Test Result Released: Yes (seen)    0 Result Notes            Component  Ref Range & Units (hover) 7 mo ago 10 mo ago 1 yr ago 2 yr ago 3 yr ago 4 yr ago 5 yr ago   Testosterone,Total 509 767 High       CM          Imagin/5/23 CT-CARDIAC SCORING  FINDINGS:  Coronary calcification:  LMA - 198.4  LCX - 0.0  LAD - 1242.2  RCA - 84.4     Total Calcium Score: 1524.9    Assessment & Plan  Encounter for preventative adult health care examination  Labs reviewed today: CMP, GFR, CBC, PSA, TSH, lipid profile, B12, hemoglobin A1c, and testosterone  - Repeat annual blood work in 2025  - Patient sees an optometrist yearly         Dyslipidemia  Receiving and atorvastatin caused significant amount of muscle pain, patient would like to discontinue statin therapy.  - Recommend moving forward with PCSK9 inhibitor, such as Praluent or Repatha.  This medication is a prescription medication that offers benefits for individuals with high cholesterol. promotes the degradation of LDL receptors on the surface of liver cells. By inhibiting PCSK9, medication would increase the number of available LDL receptors, allowing them to capture more LDL (\"bad\") cholesterol from the blood.  A PCSK9 inhibitor is a valuable treatment option for individuals with high cholesterol who experienced statin associated muscle pain, such as Mr. Wright.    The mechanism of action as well as the benefits of this drug were discussed in detail with the patient today, and he would like to proceed.  - Total Calcium Score: 1524.9         Hearing loss of both ears due to cerumen impaction  Procedure: Cerumen Removal  Risks and benefits of procedure discussed with patient.  Cerumen removed with lavage by the MA. Patient tolerated the procedure well    Post-lavage curette was performed by Silvia Stark PA-C. Post procedure exam with clear canal and normal TM.    Pt " educated about proper care of ear canal. Q-tip cleaning discouraged, use of debrox and warm water lavage discussed.         Essential hypertension  Chronic, well-controlled  Blood pressure in clinic today is 132/80  Patient was previously on amlodipine 10 mg daily in addition to losartan 50 mg daily  He discontinued losartan due to persistent cough and sinus issues as well as tinnitus.  - Continue amlodipine 10 mg daily         Low testosterone level in male  Testosterone,Total      300 - 720 ng/dL 509    Sex Hormone Bind Globulin      19 - 76 nmol/L 47    Free Testosterone      47 - 244 pg/mL 78      Well-controlled on current medication regimen  - Continue testosterone gel 1.62%.  Apply 1 pump topically to the skin daily  - Obtain repeat testosterone levels as well as hemoglobin and hematocrit levels at annual wellness visit in December 2025    Orders:    Testosterone 1.62 % Gel; Apply 1 pump topically to the affected area every day as needed    Increased prostate specific antigen (PSA) velocity  PROSTATE SPECIFIC AG SCREENING  Order: [id number]   Status: Final result       Next appt: 12/17/2025 at 08:00 AM in Cardiology (Jb Cole M.D.)       Dx: Prostate cancer screening    Test Result Released: Yes (seen)    0 Result Notes            Component  Ref Range & Units (hover) 7 mo ago  (11/26/24) 1 yr ago  (11/9/23) 2 yr ago  (11/7/22) 3 yr ago  (10/25/21) 4 yr ago  (1/8/21) 4 yr ago  (9/22/20) 5 yr ago  (9/27/19)   Prostatic Specific Antigen Tot 3.19 1.66 CM 1.65 CM 1.32 CM 1.29 CM 3.13 CM 1.36 C        Due to recent elevation of PSA from 1.66 in November 2023 to PSA level of 3.19 in November 2024, I recommend a repeat PSA for continued evaluation.    Orders:    PROSTATE SPECIFIC AG DIAGNOSTIC; Future          1. Encounter for preventative adult health care examination    2. Dyslipidemia    3. Hearing loss of both ears due to cerumen impaction    4. Essential hypertension    5. Low testosterone level in male  -  Testosterone 1.62 % Gel; Apply 1 pump topically to the affected area every day as needed  Dispense: 75 g; Refill: 0    6. Increased prostate specific antigen (PSA) velocity  - PROSTATE SPECIFIC AG DIAGNOSTIC; Future    7. Prostate cancer screening  - PROSTATE SPECIFIC AG DIAGNOSTIC; Future    8. Elevated coronary artery calcium score  - Evolocumab (REPATHA) 140 MG/ML Solution Auto-injector SubQ injection pen; Inject 1 mL under the skin every 14 days.  Dispense: 2 mL; Refill: 3      -RTC in 3 months to reassess         [1]   Past Surgical History:  Procedure Laterality Date    FOOT SURGERY Left 2010    left foot neuroma   [2]   Past Medical History:  Diagnosis Date    CAD (coronary artery disease)     Hyperlipidemia     Hypertension     Insomnia     Testosterone insufficiency    [3]   Current Outpatient Medications   Medication Sig Dispense Refill    Evolocumab (REPATHA) 140 MG/ML Solution Auto-injector SubQ injection pen Inject 1 mL under the skin every 14 days. 2 mL 3    Testosterone 1.62 % Gel Apply 1 pump topically to the affected area every day as needed 75 g 0    tadalafil 20 MG tablet Take 1 Tablet by mouth as needed for Erectile Dysfunction (Max 1 dose per 24 hrs). 10 Tablet 6    amLODIPine (NORVASC) 10 MG Tab TAKE 1 TABLET BY MOUTH EVERY DAY 90 Tablet 3    hydroCHLOROthiazide 25 MG Tab TAKE 1 TABLET BY MOUTH EVERY DAY 90 Tablet 3    cloNIDine (CATAPRES) 0.1 MG Tab TAKE 1 TABLET BY MOUTH TWICE DAILY 180 Tablet 3    omeprazole (PRILOSEC) 20 MG delayed-release capsule Take 20 mg by mouth 2 times a day.      clobetasol (TEMOVATE) 0.05 % Cream Apply topically twice a day 45 g 2    fluticasone (FLONASE) 50 MCG/ACT nasal spray Administer 1 Spray into affected nostril(S) every day.      desonide (TRIDESILON) 0.05 % Cream       Alirocumab 75 MG/ML Solution Auto-injector 75 mg once every 2 weeks administered subcutaneously 2.24 mL 3    Cholecalciferol (VITAMIN D) 2000 units Cap Take 1 Capsule by mouth every day.  (Patient not taking: Reported on 12/19/2024)       No current facility-administered medications for this visit.   [4] No Known Allergies

## 2025-07-15 NOTE — ASSESSMENT & PLAN NOTE
Chronic, well-controlled  Blood pressure in clinic today is 132/80  Patient was previously on amlodipine 10 mg daily in addition to losartan 50 mg daily  He discontinued losartan due to persistent cough and sinus issues as well as tinnitus.  - Continue amlodipine 10 mg daily

## 2025-07-15 NOTE — ASSESSMENT & PLAN NOTE
Testosterone,Total      300 - 720 ng/dL 509    Sex Hormone Bind Globulin      19 - 76 nmol/L 47    Free Testosterone      47 - 244 pg/mL 78      Well-controlled on current medication regimen  - Continue testosterone gel 1.62%.  Apply 1 pump topically to the skin daily  - Obtain repeat testosterone levels as well as hemoglobin and hematocrit levels at annual wellness visit in December 2025    Orders:    Testosterone 1.62 % Gel; Apply 1 pump topically to the affected area every day as needed

## 2025-07-15 NOTE — ASSESSMENT & PLAN NOTE
"Receiving and atorvastatin caused significant amount of muscle pain, patient would like to discontinue statin therapy.  - Recommend moving forward with PCSK9 inhibitor, such as Praluent or Repatha.  This medication is a prescription medication that offers benefits for individuals with high cholesterol. promotes the degradation of LDL receptors on the surface of liver cells. By inhibiting PCSK9, medication would increase the number of available LDL receptors, allowing them to capture more LDL (\"bad\") cholesterol from the blood.  A PCSK9 inhibitor is a valuable treatment option for individuals with high cholesterol who experienced statin associated muscle pain, such as Mr. Wright.    The mechanism of action as well as the benefits of this drug were discussed in detail with the patient today, and he would like to proceed.  - Total Calcium Score: 1524.9         "

## 2025-07-15 NOTE — ASSESSMENT & PLAN NOTE
Procedure: Cerumen Removal  Risks and benefits of procedure discussed with patient.  Cerumen removed with lavage by the MA. Patient tolerated the procedure well    Post-lavage curette was performed by Silvia Stark PA-C. Post procedure exam with clear canal and normal TM.    Pt educated about proper care of ear canal. Q-tip cleaning discouraged, use of debrox and warm water lavage discussed.

## 2025-07-15 NOTE — ASSESSMENT & PLAN NOTE
Labs reviewed today: CMP, GFR, CBC, PSA, TSH, lipid profile, B12, hemoglobin A1c, and testosterone  - Repeat annual blood work in December 2025  - Patient sees an optometrist yearly

## 2025-07-15 NOTE — ASSESSMENT & PLAN NOTE
PROSTATE SPECIFIC AG SCREENING  Order: [id number]   Status: Final result       Next appt: 12/17/2025 at 08:00 AM in Cardiology (Jb Cole M.D.)       Dx: Prostate cancer screening    Test Result Released: Yes (seen)    0 Result Notes            Component  Ref Range & Units (hover) 7 mo ago  (11/26/24) 1 yr ago  (11/9/23) 2 yr ago  (11/7/22) 3 yr ago  (10/25/21) 4 yr ago  (1/8/21) 4 yr ago  (9/22/20) 5 yr ago  (9/27/19)   Prostatic Specific Antigen Tot 3.19 1.66 CM 1.65 CM 1.32 CM 1.29 CM 3.13 CM 1.36 C        Due to recent elevation of PSA from 1.66 in November 2023 to PSA level of 3.19 in November 2024, I recommend a repeat PSA for continued evaluation.    Orders:    PROSTATE SPECIFIC AG DIAGNOSTIC; Future

## 2025-07-17 ENCOUNTER — TELEPHONE (OUTPATIENT)
Dept: MEDICAL GROUP | Facility: LAB | Age: 70
End: 2025-07-17
Payer: MEDICARE

## 2025-07-18 NOTE — TELEPHONE ENCOUNTER
"Phone Number Called: [phone]    Call outcome: Spoke with [facility]     Message: Case #: [id number]  Spoke with rep about \"Missing basic information\" on [employer] [facility] response as info was already included on PA. Rep stated ICD-10 code did not match their records, as they need ICD-10 listed on PA response. Silvia spoke with rep and made the decision to move forward with med that is covered. Being Praluent Subcutaneous solution auto injector (75mg). A new prior authorization has been submitted for that drug. Rep stated we will have a response within 24hrs.     Called pt and informed him of the update.  "

## 2025-07-22 DIAGNOSIS — R93.1 ELEVATED CORONARY ARTERY CALCIUM SCORE: ICD-10-CM

## 2025-07-22 DIAGNOSIS — E78.5 DYSLIPIDEMIA: Primary | ICD-10-CM

## 2025-07-23 ENCOUNTER — HOSPITAL ENCOUNTER (OUTPATIENT)
Dept: LAB | Facility: MEDICAL CENTER | Age: 70
End: 2025-07-23
Attending: PHYSICIAN ASSISTANT
Payer: MEDICARE

## 2025-07-23 DIAGNOSIS — R97.20 INCREASED PROSTATE SPECIFIC ANTIGEN (PSA) VELOCITY: ICD-10-CM

## 2025-07-23 DIAGNOSIS — Z12.5 PROSTATE CANCER SCREENING: ICD-10-CM

## 2025-07-23 LAB — PSA SERPL DL<=0.01 NG/ML-MCNC: 1.8 NG/ML (ref 0–4)

## 2025-07-23 PROCEDURE — 84153 ASSAY OF PSA TOTAL: CPT

## 2025-07-23 PROCEDURE — 36415 COLL VENOUS BLD VENIPUNCTURE: CPT

## 2025-08-27 DIAGNOSIS — E78.5 DYSLIPIDEMIA: ICD-10-CM

## 2025-08-27 DIAGNOSIS — Z78.9 STATIN INTOLERANCE: ICD-10-CM

## 2025-08-27 DIAGNOSIS — R93.1 ELEVATED CORONARY ARTERY CALCIUM SCORE: Primary | ICD-10-CM
